# Patient Record
Sex: FEMALE | Race: BLACK OR AFRICAN AMERICAN | NOT HISPANIC OR LATINO | URBAN - METROPOLITAN AREA
[De-identification: names, ages, dates, MRNs, and addresses within clinical notes are randomized per-mention and may not be internally consistent; named-entity substitution may affect disease eponyms.]

---

## 2017-05-26 ENCOUNTER — OUTPATIENT (OUTPATIENT)
Dept: OUTPATIENT SERVICES | Facility: HOSPITAL | Age: 67
LOS: 1 days | End: 2017-05-26
Payer: COMMERCIAL

## 2017-05-26 VITALS
SYSTOLIC BLOOD PRESSURE: 142 MMHG | HEIGHT: 61 IN | HEART RATE: 78 BPM | RESPIRATION RATE: 16 BRPM | WEIGHT: 123.9 LBS | DIASTOLIC BLOOD PRESSURE: 98 MMHG

## 2017-05-26 DIAGNOSIS — Z98.890 OTHER SPECIFIED POSTPROCEDURAL STATES: Chronic | ICD-10-CM

## 2017-05-26 DIAGNOSIS — Z85.819 PERSONAL HISTORY OF MALIGNANT NEOPLASM OF UNSPECIFIED SITE OF LIP, ORAL CAVITY, AND PHARYNX: Chronic | ICD-10-CM

## 2017-05-26 DIAGNOSIS — I77.0 ARTERIOVENOUS FISTULA, ACQUIRED: Chronic | ICD-10-CM

## 2017-05-26 DIAGNOSIS — N18.6 END STAGE RENAL DISEASE: ICD-10-CM

## 2017-05-26 DIAGNOSIS — Z01.818 ENCOUNTER FOR OTHER PREPROCEDURAL EXAMINATION: ICD-10-CM

## 2017-05-26 LAB
ANION GAP SERPL CALC-SCNC: 9 MMOL/L — SIGNIFICANT CHANGE UP (ref 5–17)
BUN SERPL-MCNC: 26 MG/DL — HIGH (ref 7–18)
CALCIUM SERPL-MCNC: 10 MG/DL — SIGNIFICANT CHANGE UP (ref 8.4–10.5)
CHLORIDE SERPL-SCNC: 102 MMOL/L — SIGNIFICANT CHANGE UP (ref 96–108)
CO2 SERPL-SCNC: 26 MMOL/L — SIGNIFICANT CHANGE UP (ref 22–31)
CREAT SERPL-MCNC: 5.34 MG/DL — HIGH (ref 0.5–1.3)
GLUCOSE SERPL-MCNC: 97 MG/DL — SIGNIFICANT CHANGE UP (ref 70–99)
HCT VFR BLD CALC: 41.9 % — SIGNIFICANT CHANGE UP (ref 34.5–45)
HGB BLD-MCNC: 13.2 G/DL — SIGNIFICANT CHANGE UP (ref 11.5–15.5)
MCHC RBC-ENTMCNC: 29.4 PG — SIGNIFICANT CHANGE UP (ref 27–34)
MCHC RBC-ENTMCNC: 31.4 GM/DL — LOW (ref 32–36)
MCV RBC AUTO: 93.5 FL — SIGNIFICANT CHANGE UP (ref 80–100)
PLATELET # BLD AUTO: 179 K/UL — SIGNIFICANT CHANGE UP (ref 150–400)
POTASSIUM SERPL-MCNC: 3.9 MMOL/L — SIGNIFICANT CHANGE UP (ref 3.5–5.3)
POTASSIUM SERPL-SCNC: 3.9 MMOL/L — SIGNIFICANT CHANGE UP (ref 3.5–5.3)
RBC # BLD: 4.48 M/UL — SIGNIFICANT CHANGE UP (ref 3.8–5.2)
RBC # FLD: 14.4 % — SIGNIFICANT CHANGE UP (ref 10.3–14.5)
SODIUM SERPL-SCNC: 137 MMOL/L — SIGNIFICANT CHANGE UP (ref 135–145)
WBC # BLD: 8.7 K/UL — SIGNIFICANT CHANGE UP (ref 3.8–10.5)
WBC # FLD AUTO: 8.7 K/UL — SIGNIFICANT CHANGE UP (ref 3.8–10.5)

## 2017-05-26 PROCEDURE — G0463: CPT

## 2017-05-26 PROCEDURE — 85027 COMPLETE CBC AUTOMATED: CPT

## 2017-05-26 PROCEDURE — 80048 BASIC METABOLIC PNL TOTAL CA: CPT

## 2017-05-26 RX ORDER — SODIUM CHLORIDE 9 MG/ML
3 INJECTION INTRAMUSCULAR; INTRAVENOUS; SUBCUTANEOUS EVERY 8 HOURS
Qty: 0 | Refills: 0 | Status: DISCONTINUED | OUTPATIENT
Start: 2017-06-06 | End: 2017-06-06

## 2017-05-26 NOTE — H&P PST ADULT - NEGATIVE ENMT SYMPTOMS
no ear pain/no sinus symptoms/no tinnitus/no nasal congestion/no nasal discharge/no hearing difficulty/no vertigo

## 2017-05-26 NOTE — H&P PST ADULT - FAMILY HISTORY
Sibling  Still living? Unknown  Family history of diabetes mellitus in sister, Age at diagnosis: Age Unknown

## 2017-05-26 NOTE — H&P PST ADULT - NSANTHOSAYNRD_GEN_A_CORE
No. DOC screening performed.  STOP BANG Legend: 0-2 = LOW Risk; 3-4 = INTERMEDIATE Risk; 5-8 = HIGH Risk

## 2017-05-26 NOTE — H&P PST ADULT - NEGATIVE OPHTHALMOLOGIC SYMPTOMS
no blurred vision L/no diplopia/no photophobia/no lacrimation L/no lacrimation R/no blurred vision R

## 2017-05-26 NOTE — H&P PST ADULT - NEGATIVE ALLERGY TYPES
no reactions to animals/no indoor environmental allergies/no reactions to insect bites/no reactions to food

## 2017-05-26 NOTE — H&P PST ADULT - PSH
AVF (arteriovenous fistula)  x3  H/O hemorrhoidectomy    H/O oral cancer    S/P bunionectomy  bilateral great toes

## 2017-05-26 NOTE — H&P PST ADULT - MUSCULOSKELETAL
detailed exam no joint erythema/no calf tenderness/no joint warmth/normal strength/no joint swelling/ROM intact details…

## 2017-06-06 ENCOUNTER — TRANSCRIPTION ENCOUNTER (OUTPATIENT)
Age: 67
End: 2017-06-06

## 2017-06-06 ENCOUNTER — OUTPATIENT (OUTPATIENT)
Dept: OUTPATIENT SERVICES | Facility: HOSPITAL | Age: 67
LOS: 1 days | Discharge: HOME | End: 2017-06-06
Payer: COMMERCIAL

## 2017-06-06 VITALS
DIASTOLIC BLOOD PRESSURE: 90 MMHG | TEMPERATURE: 98 F | OXYGEN SATURATION: 100 % | HEIGHT: 61 IN | HEART RATE: 73 BPM | RESPIRATION RATE: 16 BRPM | WEIGHT: 123.9 LBS | SYSTOLIC BLOOD PRESSURE: 115 MMHG

## 2017-06-06 VITALS
OXYGEN SATURATION: 99 % | TEMPERATURE: 98 F | RESPIRATION RATE: 14 BRPM | DIASTOLIC BLOOD PRESSURE: 82 MMHG | SYSTOLIC BLOOD PRESSURE: 120 MMHG | HEART RATE: 80 BPM

## 2017-06-06 DIAGNOSIS — N18.6 END STAGE RENAL DISEASE: ICD-10-CM

## 2017-06-06 DIAGNOSIS — Z85.819 PERSONAL HISTORY OF MALIGNANT NEOPLASM OF UNSPECIFIED SITE OF LIP, ORAL CAVITY, AND PHARYNX: Chronic | ICD-10-CM

## 2017-06-06 DIAGNOSIS — I77.0 ARTERIOVENOUS FISTULA, ACQUIRED: Chronic | ICD-10-CM

## 2017-06-06 DIAGNOSIS — Z98.890 OTHER SPECIFIED POSTPROCEDURAL STATES: Chronic | ICD-10-CM

## 2017-06-06 LAB
ANION GAP SERPL CALC-SCNC: 11 MMOL/L — SIGNIFICANT CHANGE UP (ref 5–17)
BUN SERPL-MCNC: 28 MG/DL — HIGH (ref 7–18)
CALCIUM SERPL-MCNC: 10.4 MG/DL — SIGNIFICANT CHANGE UP (ref 8.4–10.5)
CHLORIDE SERPL-SCNC: 104 MMOL/L — SIGNIFICANT CHANGE UP (ref 96–108)
CO2 SERPL-SCNC: 23 MMOL/L — SIGNIFICANT CHANGE UP (ref 22–31)
CREAT SERPL-MCNC: 5.16 MG/DL — HIGH (ref 0.5–1.3)
GLUCOSE SERPL-MCNC: 117 MG/DL — HIGH (ref 70–99)
POTASSIUM SERPL-MCNC: 4 MMOL/L — SIGNIFICANT CHANGE UP (ref 3.5–5.3)
POTASSIUM SERPL-SCNC: 4 MMOL/L — SIGNIFICANT CHANGE UP (ref 3.5–5.3)
SODIUM SERPL-SCNC: 138 MMOL/L — SIGNIFICANT CHANGE UP (ref 135–145)

## 2017-06-06 PROCEDURE — C1768: CPT

## 2017-06-06 PROCEDURE — 36830 ARTERY-VEIN NONAUTOGRAFT: CPT | Mod: LT

## 2017-06-06 PROCEDURE — 80048 BASIC METABOLIC PNL TOTAL CA: CPT

## 2017-06-06 RX ORDER — FENTANYL CITRATE 50 UG/ML
50 INJECTION INTRAVENOUS
Qty: 0 | Refills: 0 | Status: DISCONTINUED | OUTPATIENT
Start: 2017-06-06 | End: 2017-06-06

## 2017-06-06 RX ORDER — SODIUM CHLORIDE 9 MG/ML
1000 INJECTION INTRAMUSCULAR; INTRAVENOUS; SUBCUTANEOUS
Qty: 0 | Refills: 0 | Status: DISCONTINUED | OUTPATIENT
Start: 2017-06-06 | End: 2017-06-06

## 2017-06-06 RX ORDER — FENTANYL CITRATE 50 UG/ML
25 INJECTION INTRAVENOUS
Qty: 0 | Refills: 0 | Status: DISCONTINUED | OUTPATIENT
Start: 2017-06-06 | End: 2017-06-06

## 2017-06-06 RX ADMIN — FENTANYL CITRATE 25 MICROGRAM(S): 50 INJECTION INTRAVENOUS at 14:20

## 2017-06-06 RX ADMIN — FENTANYL CITRATE 25 MICROGRAM(S): 50 INJECTION INTRAVENOUS at 14:00

## 2017-06-06 RX ADMIN — SODIUM CHLORIDE 3 MILLILITER(S): 9 INJECTION INTRAMUSCULAR; INTRAVENOUS; SUBCUTANEOUS at 08:20

## 2017-06-06 NOTE — ASU DISCHARGE PLAN (ADULT/PEDIATRIC). - MEDICATION SUMMARY - MEDICATIONS TO TAKE
I will START or STAY ON the medications listed below when I get home from the hospital:    metoprolol succinate 25 mg oral tablet, extended release  -- 1 tab(s) by mouth once a day  -- Indication: For .    calcium acetate 667 mg oral capsule  -- 2 cap(s) by mouth 3 times a day  -- Indication: For .    Nephro-Sameer oral tablet  -- 1 tab(s) by mouth once a day  -- Indication: For .

## 2017-06-06 NOTE — ASU DISCHARGE PLAN (ADULT/PEDIATRIC). - SPECIAL INSTRUCTIONS
Please use ice packs for 30 minutes on then 30 minutes off as much as possible until post-operative appointment. Make sure to protect your skin by placing a towel between the ice pack and your skin.

## 2017-06-06 NOTE — ASU DISCHARGE PLAN (ADULT/PEDIATRIC). - NURSING INSTRUCTIONS
Keep dressing dry, clean, intact for 3 days. Do not get incision wet for 72 hours (3 days). After 3 days, remove dressing and leave steri strips (white tapes) on. You can shower with steri strips on. The steri strips will fall off during shower. Do not rub them off. It sometimes take 2-3 days for them to fall off.

## 2017-06-06 NOTE — BRIEF OPERATIVE NOTE - PRE-OP DX
Complication of arteriovenous dialysis fistula, sequela  06/06/2017  Non transposed Brachial Vein  Active  Darren Avila S

## 2017-06-06 NOTE — ASU DISCHARGE PLAN (ADULT/PEDIATRIC). - NOTIFY
Swelling that continues/Numbness, color, or temperature change to extremity/Bleeding that does not stop/Fever greater than 101/Numbness, tingling/Pain not relieved by Medications
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
## 2017-06-09 DIAGNOSIS — E11.9 TYPE 2 DIABETES MELLITUS WITHOUT COMPLICATIONS: ICD-10-CM

## 2017-06-09 DIAGNOSIS — E55.9 VITAMIN D DEFICIENCY, UNSPECIFIED: ICD-10-CM

## 2017-06-09 DIAGNOSIS — I12.0 HYPERTENSIVE CHRONIC KIDNEY DISEASE WITH STAGE 5 CHRONIC KIDNEY DISEASE OR END STAGE RENAL DISEASE: ICD-10-CM

## 2017-06-09 DIAGNOSIS — I10 ESSENTIAL (PRIMARY) HYPERTENSION: ICD-10-CM

## 2017-06-09 DIAGNOSIS — Z87.891 PERSONAL HISTORY OF NICOTINE DEPENDENCE: ICD-10-CM

## 2017-06-09 DIAGNOSIS — E83.39 OTHER DISORDERS OF PHOSPHORUS METABOLISM: ICD-10-CM

## 2017-06-09 DIAGNOSIS — T82.898A OTHER SPECIFIED COMPLICATION OF VASCULAR PROSTHETIC DEVICES, IMPLANTS AND GRAFTS, INITIAL ENCOUNTER: ICD-10-CM

## 2017-12-02 NOTE — H&P PST ADULT - REASON FOR ADMISSION
[facility]    PATIENT'S NAME: Devi Downing   ATTENDING PHYSICIAN: James Padilla MD   OPERATING PHYSICIAN: Jason Aguirre M.D.    PATIENT ACCOUNT#:   [de-identified]    LOCATION:  [address] [place] [address]  MEDICAL RECORD #:   [id number]       DATE OF YULY second layer being 0 Vicryl in a running imbricating-type fashion. Two additional figure-of-eight sutures were placed for hemostasis. The uterus was placed back in the abdomen. Irrigation was performed. Gutters were cleared.   Reinspection of the lower Difficult cannulation left AVF

## 2019-01-24 PROBLEM — I10 ESSENTIAL (PRIMARY) HYPERTENSION: Chronic | Status: ACTIVE | Noted: 2017-05-26

## 2019-01-24 PROBLEM — N18.6 END STAGE RENAL DISEASE: Chronic | Status: ACTIVE | Noted: 2017-05-26

## 2019-01-24 PROBLEM — E55.9 VITAMIN D DEFICIENCY, UNSPECIFIED: Chronic | Status: ACTIVE | Noted: 2017-05-26

## 2019-01-24 PROBLEM — E83.39 OTHER DISORDERS OF PHOSPHORUS METABOLISM: Chronic | Status: ACTIVE | Noted: 2017-05-26

## 2019-02-05 ENCOUNTER — APPOINTMENT (OUTPATIENT)
Dept: CARDIOLOGY | Facility: CLINIC | Age: 69
End: 2019-02-05
Payer: MEDICARE

## 2019-02-05 ENCOUNTER — NON-APPOINTMENT (OUTPATIENT)
Age: 69
End: 2019-02-05

## 2019-02-05 VITALS
BODY MASS INDEX: 21.99 KG/M2 | DIASTOLIC BLOOD PRESSURE: 90 MMHG | WEIGHT: 118 LBS | OXYGEN SATURATION: 96 % | SYSTOLIC BLOOD PRESSURE: 146 MMHG | HEART RATE: 71 BPM | HEIGHT: 61.5 IN

## 2019-02-05 DIAGNOSIS — Z95.5 PRESENCE OF CORONARY ANGIOPLASTY IMPLANT AND GRAFT: ICD-10-CM

## 2019-02-05 DIAGNOSIS — R07.9 CHEST PAIN, UNSPECIFIED: ICD-10-CM

## 2019-02-05 DIAGNOSIS — I10 ESSENTIAL (PRIMARY) HYPERTENSION: ICD-10-CM

## 2019-02-05 DIAGNOSIS — I25.10 ATHEROSCLEROTIC HEART DISEASE OF NATIVE CORONARY ARTERY W/OUT ANGINA PECTORIS: ICD-10-CM

## 2019-02-05 DIAGNOSIS — R06.02 SHORTNESS OF BREATH: ICD-10-CM

## 2019-02-05 PROCEDURE — 93306 TTE W/DOPPLER COMPLETE: CPT

## 2019-02-05 PROCEDURE — 99215 OFFICE O/P EST HI 40 MIN: CPT

## 2019-02-05 PROCEDURE — 93000 ELECTROCARDIOGRAM COMPLETE: CPT

## 2019-02-05 RX ORDER — SEVELAMER CARBONATE 800 MG/1
800 TABLET, FILM COATED ORAL 3 TIMES DAILY
Refills: 0 | Status: ACTIVE | COMMUNITY

## 2019-02-05 RX ORDER — NITROGLYCERIN 0.4 MG/1
0.4 TABLET SUBLINGUAL
Qty: 25 | Refills: 1 | Status: ACTIVE | COMMUNITY
Start: 2019-02-05 | End: 1900-01-01

## 2019-02-05 NOTE — DISCUSSION/SUMMARY
[FreeTextEntry1] : In a summary Therese Burrell is an elderly female with history of CAD s/p stents with chest tightness and shortness of breath on exertion, echo done showed normal LV systolic function. Nuclear stress test to rule out ischemia. Further plan based on stress test results. Any chest pains take SL NTG and go to nearest ER. Hypertension, BP high. Explained her to take Metoprolol regularly. Cut down on salt intake. ESRD, on hemodialysis. Follow up after stress test.

## 2019-02-05 NOTE — HISTORY OF PRESENT ILLNESS
[FreeTextEntry1] : Therese Burrell is a 68 year old female with history of CAD s/p stents x 2 in 2018 at [facility], hypertension, ESRD on hemodialysis for 3 years and diet controlled diabetes and hypercholesterolemia comes for cardiac evaluation. Complaining of chronic chest tightness/ pressure on walking long distance or uphill, 7/10 in intensity, non radiating lasts for couple of minutes and relieved by rest which improved after stents but getting worse for last 6 months. No pain at rest. Also complaining of mild chronic shortness of breath on exertion and fatigue which is relieved with rest in few minutes. No palpitations. Last seen cardiologist was 6 months ago. Not taking Metoprolol regularly. Follows up with PCP.

## 2019-02-05 NOTE — REVIEW OF SYSTEMS
[Headache] : headache [Feeling Fatigued] : feeling fatigued [Shortness Of Breath] : shortness of breath [Chest Pain] : chest pain [Abdominal Pain] : abdominal pain [Joint Pain] : joint pain [Negative] : Endocrine [Fever] : no fever [Chills] : no chills [Lower Ext Edema] : no extremity edema [Palpitations] : no palpitations [Nausea] : no nausea [Heartburn] : no heartburn [Change in Appetite] : no change in appetite [Dysphagia] : no dysphagia [Easy Bleeding] : no tendency for easy bleeding [Easy Bruising] : no tendency for easy bruising

## 2019-02-05 NOTE — REASON FOR VISIT
[Follow-Up - Clinic] : a clinic follow-up of [Chest Pain] : chest pain [Coronary Artery Disease] : coronary artery disease [Hypertension] : hypertension [FreeTextEntry1] : shortness of breath on exertion.

## 2019-02-19 ENCOUNTER — APPOINTMENT (OUTPATIENT)
Dept: CV DIAGNOSTICS | Facility: HOSPITAL | Age: 69
End: 2019-02-19

## 2019-02-26 ENCOUNTER — APPOINTMENT (OUTPATIENT)
Dept: CARDIOLOGY | Facility: CLINIC | Age: 69
End: 2019-02-26

## 2019-08-06 ENCOUNTER — APPOINTMENT (OUTPATIENT)
Dept: CARDIOLOGY | Facility: CLINIC | Age: 69
End: 2019-08-06

## 2020-07-29 ENCOUNTER — TRANSCRIPTION ENCOUNTER (OUTPATIENT)
Age: 70
End: 2020-07-29

## 2020-07-29 ENCOUNTER — INPATIENT (INPATIENT)
Facility: HOSPITAL | Age: 70
LOS: 8 days | Discharge: NURSING, REHAB OR LONG-TERM CARE | DRG: 853 | End: 2020-08-07
Attending: INTERNAL MEDICINE | Admitting: INTERNAL MEDICINE
Payer: COMMERCIAL

## 2020-07-29 VITALS
WEIGHT: 145.06 LBS | HEART RATE: 72 BPM | DIASTOLIC BLOOD PRESSURE: 70 MMHG | RESPIRATION RATE: 16 BRPM | SYSTOLIC BLOOD PRESSURE: 127 MMHG | HEIGHT: 62 IN | OXYGEN SATURATION: 98 % | TEMPERATURE: 99 F

## 2020-07-29 DIAGNOSIS — D64.9 ANEMIA, UNSPECIFIED: ICD-10-CM

## 2020-07-29 DIAGNOSIS — Z85.819 PERSONAL HISTORY OF MALIGNANT NEOPLASM OF UNSPECIFIED SITE OF LIP, ORAL CAVITY, AND PHARYNX: Chronic | ICD-10-CM

## 2020-07-29 DIAGNOSIS — Z29.9 ENCOUNTER FOR PROPHYLACTIC MEASURES, UNSPECIFIED: ICD-10-CM

## 2020-07-29 DIAGNOSIS — I77.0 ARTERIOVENOUS FISTULA, ACQUIRED: Chronic | ICD-10-CM

## 2020-07-29 DIAGNOSIS — N18.6 END STAGE RENAL DISEASE: ICD-10-CM

## 2020-07-29 DIAGNOSIS — L03.114 CELLULITIS OF LEFT UPPER LIMB: ICD-10-CM

## 2020-07-29 DIAGNOSIS — R09.89 OTHER SPECIFIED SYMPTOMS AND SIGNS INVOLVING THE CIRCULATORY AND RESPIRATORY SYSTEMS: ICD-10-CM

## 2020-07-29 DIAGNOSIS — E11.9 TYPE 2 DIABETES MELLITUS WITHOUT COMPLICATIONS: ICD-10-CM

## 2020-07-29 DIAGNOSIS — E55.9 VITAMIN D DEFICIENCY, UNSPECIFIED: ICD-10-CM

## 2020-07-29 DIAGNOSIS — Z98.890 OTHER SPECIFIED POSTPROCEDURAL STATES: Chronic | ICD-10-CM

## 2020-07-29 DIAGNOSIS — I10 ESSENTIAL (PRIMARY) HYPERTENSION: ICD-10-CM

## 2020-07-29 DIAGNOSIS — E46 UNSPECIFIED PROTEIN-CALORIE MALNUTRITION: ICD-10-CM

## 2020-07-29 LAB
ABO RH CONFIRMATION: SIGNIFICANT CHANGE UP
ALBUMIN SERPL ELPH-MCNC: 1.6 G/DL — LOW (ref 3.5–5)
ALP SERPL-CCNC: 230 U/L — HIGH (ref 40–120)
ALT FLD-CCNC: 24 U/L DA — SIGNIFICANT CHANGE UP (ref 10–60)
ANION GAP SERPL CALC-SCNC: 7 MMOL/L — SIGNIFICANT CHANGE UP (ref 5–17)
APPEARANCE UR: ABNORMAL
APTT BLD: 35.4 SEC — SIGNIFICANT CHANGE UP (ref 27.5–35.5)
AST SERPL-CCNC: 26 U/L — SIGNIFICANT CHANGE UP (ref 10–40)
BACTERIA # UR AUTO: ABNORMAL /HPF
BASOPHILS # BLD AUTO: 0.04 K/UL — SIGNIFICANT CHANGE UP (ref 0–0.2)
BASOPHILS NFR BLD AUTO: 0.4 % — SIGNIFICANT CHANGE UP (ref 0–2)
BILIRUB SERPL-MCNC: 0.5 MG/DL — SIGNIFICANT CHANGE UP (ref 0.2–1.2)
BILIRUB UR-MCNC: ABNORMAL
BLD GP AB SCN SERPL QL: SIGNIFICANT CHANGE UP
BUN SERPL-MCNC: 10 MG/DL — SIGNIFICANT CHANGE UP (ref 7–18)
CALCIUM SERPL-MCNC: 8.2 MG/DL — LOW (ref 8.4–10.5)
CHLORIDE SERPL-SCNC: 97 MMOL/L — SIGNIFICANT CHANGE UP (ref 96–108)
CO2 SERPL-SCNC: 33 MMOL/L — HIGH (ref 22–31)
COLOR SPEC: ABNORMAL
COMMENT - URINE: SIGNIFICANT CHANGE UP
CREAT SERPL-MCNC: 2.86 MG/DL — HIGH (ref 0.5–1.3)
DIFF PNL FLD: ABNORMAL
EOSINOPHIL # BLD AUTO: 0.05 K/UL — SIGNIFICANT CHANGE UP (ref 0–0.5)
EOSINOPHIL NFR BLD AUTO: 0.5 % — SIGNIFICANT CHANGE UP (ref 0–6)
EPI CELLS # UR: SIGNIFICANT CHANGE UP /HPF
GLUCOSE SERPL-MCNC: 93 MG/DL — SIGNIFICANT CHANGE UP (ref 70–99)
GLUCOSE UR QL: NEGATIVE — SIGNIFICANT CHANGE UP
HCT VFR BLD CALC: 21.3 % — LOW (ref 34.5–45)
HGB BLD-MCNC: 7.1 G/DL — LOW (ref 11.5–15.5)
HIV 1 & 2 AB SERPL IA.RAPID: SIGNIFICANT CHANGE UP
HYPOCHROMIA BLD QL: SLIGHT — SIGNIFICANT CHANGE UP
IMM GRANULOCYTES NFR BLD AUTO: 0.6 % — SIGNIFICANT CHANGE UP (ref 0–1.5)
INR BLD: 1.31 RATIO — HIGH (ref 0.88–1.16)
KETONES UR-MCNC: NEGATIVE — SIGNIFICANT CHANGE UP
LACTATE SERPL-SCNC: 1 MMOL/L — SIGNIFICANT CHANGE UP (ref 0.7–2)
LEUKOCYTE ESTERASE UR-ACNC: ABNORMAL
LYMPHOCYTES # BLD AUTO: 1.23 K/UL — SIGNIFICANT CHANGE UP (ref 1–3.3)
LYMPHOCYTES # BLD AUTO: 11.9 % — LOW (ref 13–44)
MANUAL SMEAR VERIFICATION: SIGNIFICANT CHANGE UP
MCHC RBC-ENTMCNC: 29.7 PG — SIGNIFICANT CHANGE UP (ref 27–34)
MCHC RBC-ENTMCNC: 33.3 GM/DL — SIGNIFICANT CHANGE UP (ref 32–36)
MCV RBC AUTO: 89.1 FL — SIGNIFICANT CHANGE UP (ref 80–100)
MONOCYTES # BLD AUTO: 1.1 K/UL — HIGH (ref 0–0.9)
MONOCYTES NFR BLD AUTO: 10.6 % — SIGNIFICANT CHANGE UP (ref 2–14)
NEUTROPHILS # BLD AUTO: 7.89 K/UL — HIGH (ref 1.8–7.4)
NEUTROPHILS NFR BLD AUTO: 76 % — SIGNIFICANT CHANGE UP (ref 43–77)
NITRITE UR-MCNC: NEGATIVE — SIGNIFICANT CHANGE UP
NRBC # BLD: 0 /100 WBCS — SIGNIFICANT CHANGE UP (ref 0–0)
PH UR: 8 — SIGNIFICANT CHANGE UP (ref 5–8)
PLAT MORPH BLD: NORMAL — SIGNIFICANT CHANGE UP
PLATELET # BLD AUTO: 146 K/UL — LOW (ref 150–400)
PLATELET COUNT - ESTIMATE: ABNORMAL
POTASSIUM SERPL-MCNC: 3 MMOL/L — LOW (ref 3.5–5.3)
POTASSIUM SERPL-SCNC: 3 MMOL/L — LOW (ref 3.5–5.3)
PROT SERPL-MCNC: 5.8 G/DL — LOW (ref 6–8.3)
PROT UR-MCNC: 100
PROTHROM AB SERPL-ACNC: 15.1 SEC — HIGH (ref 10.6–13.6)
RBC # BLD: 2.39 M/UL — LOW (ref 3.8–5.2)
RBC # FLD: 21.4 % — HIGH (ref 10.3–14.5)
RBC BLD AUTO: ABNORMAL
RBC CASTS # UR COMP ASSIST: >50 /HPF (ref 0–2)
SODIUM SERPL-SCNC: 137 MMOL/L — SIGNIFICANT CHANGE UP (ref 135–145)
SP GR SPEC: 1.01 — SIGNIFICANT CHANGE UP (ref 1.01–1.02)
TARGETS BLD QL SMEAR: SLIGHT — SIGNIFICANT CHANGE UP
TROPONIN I SERPL-MCNC: <0.015 NG/ML — SIGNIFICANT CHANGE UP (ref 0–0.04)
UROBILINOGEN FLD QL: NEGATIVE — SIGNIFICANT CHANGE UP
WBC # BLD: 10.37 K/UL — SIGNIFICANT CHANGE UP (ref 3.8–10.5)
WBC # FLD AUTO: 10.37 K/UL — SIGNIFICANT CHANGE UP (ref 3.8–10.5)
WBC UR QL: ABNORMAL /HPF (ref 0–5)

## 2020-07-29 PROCEDURE — 99285 EMERGENCY DEPT VISIT HI MDM: CPT

## 2020-07-29 PROCEDURE — 99223 1ST HOSP IP/OBS HIGH 75: CPT

## 2020-07-29 PROCEDURE — 71045 X-RAY EXAM CHEST 1 VIEW: CPT | Mod: 26

## 2020-07-29 PROCEDURE — 93990 DOPPLER FLOW TESTING: CPT | Mod: 26

## 2020-07-29 RX ORDER — CALCIUM ACETATE 667 MG
2 TABLET ORAL
Qty: 0 | Refills: 0 | DISCHARGE

## 2020-07-29 RX ORDER — METOPROLOL TARTRATE 50 MG
1 TABLET ORAL
Qty: 0 | Refills: 0 | DISCHARGE

## 2020-07-29 RX ORDER — METOPROLOL TARTRATE 50 MG
25 TABLET ORAL DAILY
Refills: 0 | Status: DISCONTINUED | OUTPATIENT
Start: 2020-07-29 | End: 2020-07-30

## 2020-07-29 RX ORDER — DIPHENHYDRAMINE HCL 50 MG
25 CAPSULE ORAL ONCE
Refills: 0 | Status: COMPLETED | OUTPATIENT
Start: 2020-07-29 | End: 2020-07-30

## 2020-07-29 RX ORDER — ACETAMINOPHEN 500 MG
1000 TABLET ORAL ONCE
Refills: 0 | Status: COMPLETED | OUTPATIENT
Start: 2020-07-29 | End: 2020-07-29

## 2020-07-29 RX ORDER — CALCIUM ACETATE 667 MG
667 TABLET ORAL
Refills: 0 | Status: DISCONTINUED | OUTPATIENT
Start: 2020-07-29 | End: 2020-07-30

## 2020-07-29 RX ORDER — HEPARIN SODIUM 5000 [USP'U]/ML
5000 INJECTION INTRAVENOUS; SUBCUTANEOUS EVERY 12 HOURS
Refills: 0 | Status: DISCONTINUED | OUTPATIENT
Start: 2020-07-29 | End: 2020-07-30

## 2020-07-29 RX ORDER — POTASSIUM CHLORIDE 20 MEQ
40 PACKET (EA) ORAL ONCE
Refills: 0 | Status: COMPLETED | OUTPATIENT
Start: 2020-07-29 | End: 2020-07-29

## 2020-07-29 RX ORDER — ASPIRIN/CALCIUM CARB/MAGNESIUM 324 MG
1 TABLET ORAL
Qty: 0 | Refills: 0 | DISCHARGE

## 2020-07-29 RX ORDER — POTASSIUM CHLORIDE 20 MEQ
40 PACKET (EA) ORAL ONCE
Refills: 0 | Status: DISCONTINUED | OUTPATIENT
Start: 2020-07-29 | End: 2020-07-29

## 2020-07-29 RX ORDER — ASPIRIN/CALCIUM CARB/MAGNESIUM 324 MG
81 TABLET ORAL DAILY
Refills: 0 | Status: DISCONTINUED | OUTPATIENT
Start: 2020-07-29 | End: 2020-07-30

## 2020-07-29 RX ORDER — ACETAMINOPHEN 500 MG
650 TABLET ORAL ONCE
Refills: 0 | Status: COMPLETED | OUTPATIENT
Start: 2020-07-29 | End: 2020-07-29

## 2020-07-29 RX ORDER — HYDROMORPHONE HYDROCHLORIDE 2 MG/ML
0.5 INJECTION INTRAMUSCULAR; INTRAVENOUS; SUBCUTANEOUS ONCE
Refills: 0 | Status: DISCONTINUED | OUTPATIENT
Start: 2020-07-29 | End: 2020-07-29

## 2020-07-29 RX ORDER — PIPERACILLIN AND TAZOBACTAM 4; .5 G/20ML; G/20ML
3.38 INJECTION, POWDER, LYOPHILIZED, FOR SOLUTION INTRAVENOUS ONCE
Refills: 0 | Status: COMPLETED | OUTPATIENT
Start: 2020-07-29 | End: 2020-07-29

## 2020-07-29 RX ORDER — OXYCODONE AND ACETAMINOPHEN 5; 325 MG/1; MG/1
1 TABLET ORAL ONCE
Refills: 0 | Status: DISCONTINUED | OUTPATIENT
Start: 2020-07-29 | End: 2020-07-29

## 2020-07-29 RX ORDER — VANCOMYCIN HCL 1 G
1000 VIAL (EA) INTRAVENOUS ONCE
Refills: 0 | Status: COMPLETED | OUTPATIENT
Start: 2020-07-29 | End: 2020-07-29

## 2020-07-29 RX ADMIN — Medication 250 MILLIGRAM(S): at 16:34

## 2020-07-29 RX ADMIN — Medication 400 MILLIGRAM(S): at 22:11

## 2020-07-29 RX ADMIN — Medication 1000 MILLIGRAM(S): at 22:55

## 2020-07-29 RX ADMIN — HYDROMORPHONE HYDROCHLORIDE 0.5 MILLIGRAM(S): 2 INJECTION INTRAMUSCULAR; INTRAVENOUS; SUBCUTANEOUS at 23:28

## 2020-07-29 RX ADMIN — PIPERACILLIN AND TAZOBACTAM 200 GRAM(S): 4; .5 INJECTION, POWDER, LYOPHILIZED, FOR SOLUTION INTRAVENOUS at 16:34

## 2020-07-29 RX ADMIN — Medication 40 MILLIEQUIVALENT(S): at 21:52

## 2020-07-29 RX ADMIN — OXYCODONE AND ACETAMINOPHEN 1 TABLET(S): 5; 325 TABLET ORAL at 17:00

## 2020-07-29 RX ADMIN — Medication 650 MILLIGRAM(S): at 17:00

## 2020-07-29 RX ADMIN — OXYCODONE AND ACETAMINOPHEN 1 TABLET(S): 5; 325 TABLET ORAL at 15:56

## 2020-07-29 RX ADMIN — Medication 650 MILLIGRAM(S): at 15:56

## 2020-07-29 NOTE — H&P ADULT - NSICDXPASTSURGICALHX_GEN_ALL_CORE_FT
PAST SURGICAL HISTORY:  AVF (arteriovenous fistula) x3    H/O hemorrhoidectomy     H/O oral cancer     S/P bunionectomy bilateral great toes

## 2020-07-29 NOTE — H&P ADULT - NSHPSOCIALHISTORY_GEN_ALL_CORE
Pt stays at [facility] rehab   Pt denies any smoking, alcohol or any form of substance abuse

## 2020-07-29 NOTE — H&P ADULT - PROBLEM SELECTOR PLAN 4
Pt has PMH of DM Pt has PMH of DM  Pt on Humalog sliding scale at facility   RBS- 93  C/W HSS  F/U A1C

## 2020-07-29 NOTE — H&P ADULT - NSICDXFAMILYHX_GEN_ALL_CORE_FT
FAMILY HISTORY:  Sibling  Still living? Unknown  Family history of diabetes mellitus in sister, Age at diagnosis: Age Unknown

## 2020-07-29 NOTE — H&P ADULT - NSICDXPASTMEDICALHX_GEN_ALL_CORE_FT
PAST MEDICAL HISTORY:  DM (diabetes mellitus)     ESRD (end stage renal disease)     Hyperphosphatemia     Hypertension     Vitamin D deficiency

## 2020-07-29 NOTE — CONSULT NOTE ADULT - SUBJECTIVE AND OBJECTIVE BOX
Vascular Surgery Consultation Note    Patient is a 69y old  Female who presents with a chief complaint of     HPI:  68 yo F with PMH ESRD, h/o L AVF created 3-4 years ago s/p exploration of AVF, interposition graft 2017 presents to ED c/o L UE swelling and pain at site of graft during dialysis x 1 week.  Pt states she had HD yesterday which was completed but had terrible pain during the dialysis. Pt states she has no fevers or chills. Denies shortness of breath. Pt denies drainage or bleeding from skin.     PAST MEDICAL & SURGICAL HISTORY:  Vitamin D deficiency  ESRD (end stage renal disease)  Hyperphosphatemia  Hypertension  S/P bunionectomy: bilateral great toes  H/O hemorrhoidectomy  H/O oral cancer  AVF (arteriovenous fistula): x3      Allergies    sulfADIAZINE (Angioedema)    Intolerances        MEDICATIONS  (STANDING):    MEDICATIONS  (PRN):      Vital Signs Last 24 Hrs  T(C): 37.1 (2020 14:57), Max: 37.1 (2020 14:57)  T(F): 98.7 (2020 14:57), Max: 98.7 (2020 14:57)  HR: 72 (2020 14:57) (72 - 72)  BP: 127/70 (2020 14:57) (127/70 - 127/70)  BP(mean): --  RR: 16 (2020 14:57) (16 - 16)  SpO2: 98% (2020 14:57) (98% - 98%)    Physical Exam:  Gen: awake, alert oriented NAD  Abd: soft NT ND no pulsatile mass  Ext: warm to touch, L arm + swelling  Vasc: good capillary refill, b/l fingertips cool to touch, palpable radial pulses, + graft with thrill, distal portion of graft w/ erythema, + tenderness to palpation    Labs:                          7.1    10.37 )-----------( 146      ( 2020 16:23 )             21.3     07-    137  |  97  |  10  ----------------------------<  93  3.0<L>   |  33<H>  |  2.86<H>    Ca    8.2<L>      2020 16:23    TPro  5.8<L>  /  Alb  1.6<L>  /  TBili  0.5  /  DBili  x   /  AST  26  /  ALT  24  /  AlkPhos  230<H>      PT/INR - ( 2020 16:23 )   PT: 15.1 sec;   INR: 1.31 ratio         PTT - ( 2020 16:23 )  PTT:35.4 sec  Urinalysis Basic - ( 2020 17:02 )    Color: Red / Appearance: bloody / S.015 / pH: x  Gluc: x / Ketone: Negative  / Bili: Small / Urobili: Negative   Blood: x / Protein: 100 / Nitrite: Negative   Leuk Esterase: Moderate / RBC: >50 /HPF / WBC 11-25 /HPF   Sq Epi: x / Non Sq Epi: Few /HPF / Bacteria: Moderate /HPF

## 2020-07-29 NOTE — H&P ADULT - PROBLEM SELECTOR PLAN 7
IMPROVE VTE Individual Risk Assessment    RISK                                                          Points  [] Previous VTE                                           3  [] Thrombophilia                                        2  [] Lower limb paralysis                              2   [] Current Cancer                                       2   [] Immobilization > 24 hrs                        1  [X] ICU/CCU stay > 24 hours                       1  [X] Age > 60                                                   1    IMPROVE VTE Score: 1   Heparin 5000sc q12

## 2020-07-29 NOTE — ED PROVIDER NOTE - NS ED MD TWO NIGHTS YN
Peripheral Block  Performed by: WOJCIECH PICKERING  Authorized by: WOJCIECH PICKERING     Start Time:  6/19/2019 5:20 PM  End Time:  6/19/2019 5:25 PM  Reason for Block: at surgeon's request and post-op pain management    patient identified, IV checked, site marked, risks and benefits discussed, surgical consent, monitors and equipment checked, pre-op evaluation and timeout performed    Patient Position:  Supine  Prep: ChloraPrep    Monitoring:  Heart rate, continuous pulse ox and cardiac monitor  Block Region:  Lower Extremity  Lower Extremity - Block Type:  FEMORAL nerve block, Supra-Inguinal Fascia Iliaca approach      Laterality:  Right  Procedures: ultrasound guided  Image captured, interpreted and electronically stored.  Local Infiltration:  Lidocaine  Strength:  1 %  Dose:  3 ml  Block Type:  Single-shot  Needle Length:  100mm  Needle Gauge:  21 G  Needle Localization:  Ultrasound guidance  Injection Assessment:  Negative aspiration for heme, no paresthesia on injection, incremental injection and local visualized surrounding nerve on ultrasound  Evidence of intravascular injection: No     Suprainguinal Fascia Iliaca Block   With the patient supine, the US transducer was placed perpendicular to the ASIS of the operative side. The ASIS was identified at a point where the internal oblique, transversus abdominis and psoas major are stacked medial to the iliacus muscle. The plane in between was the fascia iliaca. A nerve block needle was advanced into the fascia iliaca and local anesthetic was injected deep/lateral to the fascia iliaca, just above the iliacus muscle.    Ropivacaine 0.5% Batch CRV [id number] Exp Jan 2021         Yes

## 2020-07-29 NOTE — ED ADULT NURSE NOTE - NSIMPLEMENTINTERV_GEN_ALL_ED
Implemented All Fall Risk Interventions:  Barry to call system. Call bell, personal items and telephone within reach. Instruct patient to call for assistance. Room bathroom lighting operational. Non-slip footwear when patient is off stretcher. Physically safe environment: no spills, clutter or unnecessary equipment. Stretcher in lowest position, wheels locked, appropriate side rails in place. Provide visual cue, wrist band, yellow gown, etc. Monitor gait and stability. Monitor for mental status changes and reorient to person, place, and time. Review medications for side effects contributing to fall risk. Reinforce activity limits and safety measures with patient and family.

## 2020-07-29 NOTE — ED PROVIDER NOTE - OBJECTIVE STATEMENT
69 year old female with multiple comorbidities and ESRD presenting here with left arm fistula pain and swelling x1 week. Patient reports that symptoms started after dialysis a week ago, stating that it felt warm. Denies fever, chills, dizziness, weakness, or any other acute complaints. Reports that she has chest pain in the mid chest that is chronic for years now. Sent in today from rehab as nurses noted the swelling to the arm with erythema and warmth.

## 2020-07-29 NOTE — ED PROVIDER NOTE - CLINICAL SUMMARY MEDICAL DECISION MAKING FREE TEXT BOX
Patient with likely infected cellulitis vs deep infection for fistula. Will give broad dose of antibiotics and admit with vascular consult.

## 2020-07-29 NOTE — H&P ADULT - PROBLEM SELECTOR PLAN 3
Pt noted to have low Hb on admission 7.1  Anemia likely from CKD   Plan to transfuse 1unit of PRBC  F/U Anemia panel

## 2020-07-29 NOTE — H&P ADULT - NSHPLABSRESULTS_GEN_ALL_CORE
7.1    10.37 )-----------( 146      ( 2020 16:23 )             21.3           137  |  97  |  10  ----------------------------<  93  3.0<L>   |  33<H>  |  2.86<H>    Ca    8.2<L>      2020 16:23    TPro  5.8<L>  /  Alb  1.6<L>  /  TBili  0.5  /  DBili  x   /  AST  26  /  ALT  24  /  AlkPhos  230<H>                Urinalysis Basic - ( 2020 17:02 )    Color: Red / Appearance: bloody / S.015 / pH: x  Gluc: x / Ketone: Negative  / Bili: Small / Urobili: Negative   Blood: x / Protein: 100 / Nitrite: Negative   Leuk Esterase: Moderate / RBC: >50 /HPF / WBC 11-25 /HPF   Sq Epi: x / Non Sq Epi: Few /HPF / Bacteria: Moderate /HPF        PT/INR - ( 2020 16:23 )   PT: 15.1 sec;   INR: 1.31 ratio         PTT - ( 2020 16:23 )  PTT:35.4 sec    Lactate Trend   @ 16:23 Lactate:1.0       CARDIAC MARKERS ( 2020 16:23 )  <0.015 ng/mL / x     / x     / x     / x            CAPILLARY BLOOD GLUCOSE

## 2020-07-29 NOTE — H&P ADULT - NSHPPHYSICALEXAM_GEN_ALL_CORE
Vital Signs (24 Hrs):  T(C): 36.6 (07-29-20 @ 19:34), Max: 37.1 (07-29-20 @ 14:57)  HR: 77 (07-29-20 @ 19:34) (72 - 77)  BP: 113/68 (07-29-20 @ 19:34) (113/68 - 127/70)  RR: 18 (07-29-20 @ 19:34) (16 - 18)  SpO2: 98% (07-29-20 @ 19:34) (98% - 98%)  Wt(kg): --  Daily Height in cm: 157.48 (29 Jul 2020 14:57)    Daily     I&O's Summary

## 2020-07-29 NOTE — H&P ADULT - PROBLEM SELECTOR PLAN 1
Pt admitted for cellulitis at L arm at the av fistula site x 1 week \  a/w with erythema, swelling and tenderness  Vascular consulted-  concerns of possible infiltration vs underlying infection  Recc to hold HD for 2 days  In case of urgent need of HD, Liv to be placed  s/p 1 dose of zosyn and Vanco in ED  c/w iv abx zosyn and Vanco( with the HD session)  F/U Venous doppler L UE  ID consult- Dr. Boston

## 2020-07-29 NOTE — H&P ADULT - HISTORY OF PRESENT ILLNESS
69F from [facility], with PMH of HTN, ESRD, DM, Hyperphosphatemia,  Oral cancer, comes to the ED  with left arm AV fistula pain and swelling x1 week. Patient reports that symptoms started after dialysis a week ago, stating that it felt warm. Denies fever, chills, dizziness, weakness, or any other acute complaints. Reports that she has chest pain in the mid chest that is chronic for years now. Sent in today from rehab as nurses noted the swelling to the arm with erythema and     In the ED,   Pt is AAOX3, No signs of any distress  Vitals- 113/ 68, HR- 77, afebrile  wbc- 10.37  Hb 7.1  UA- UTI 69F from [facility], with PMH of HTN, ESRD, DM, Hyperphosphatemia,  Oral cancer(s/p excision) , comes to the ED  with left arm AV fistula pain and swelling x1 week. Pt was in her usual state of health until last week when she went for her HD session and developed gradual onset of swelling and pain at the AVF site on L arm. As per the patient the HD nurse had inserted the needle incorrectly that caused the swelling and severe pain. AVF however was not occluded and she had her HD sessions as per the schedule T/Th/Sa with her last HD session being yesterday.  Reports that she has chest pain in the mid chest that is chronic for years now.   Pt denies any smoking, alcohol or any form of substance abuse.   In the ED,   Pt is AAOX3, moderate distress due to pain   Vitals- 113/ 68, HR- 77, afebrile  wbc- 10.37  Hb 7.1  UA- UTI   EKG- NSR

## 2020-07-29 NOTE — ED ADULT NURSE NOTE - OBJECTIVE STATEMENT
Pt presents to ed from rehab center for c/o L arm dialysis port pain & swelling x 6 days. pt receives dialysis Tues, Thurs & Sat. Last session was on 7/28/20. Pt dialysis port presents swollen, warm & tender. Denies fever, chills, dizziness, HA, sob. Pt also endorses midsternal cp x several weeks that has been intermittent & non radiating. Breathing unlabored on RA.

## 2020-07-29 NOTE — H&P ADULT - PROBLEM SELECTOR PLAN 5
Pt had PMH of HTN   As per Pt BP has been well controlled ever since she is on HD  home meds- metoprolol 25mg od  c/w Metoprolol with parameters

## 2020-07-29 NOTE — H&P ADULT - PROBLEM SELECTOR PLAN 6
IMPROVE VTE Individual Risk Assessment    RISK                                                          Points  [] Previous VTE                                           3  [] Thrombophilia                                        2  [] Lower limb paralysis                              2   [] Current Cancer                                       2   [] Immobilization > 24 hrs                        1  [X] ICU/CCU stay > 24 hours                       1  [X] Age > 60                                                   1    IMPROVE VTE Score: 1   Heparin 5000sc q12 Pt noted to be very cachectic with dry skin   Has severe anemia   f/u nutrition consult

## 2020-07-29 NOTE — ED ADULT NURSE NOTE - CHPI ED NUR SYMPTOMS NEG
no dizziness/no fever/no tingling/no nausea/no chills/no decreased eating/drinking/no weakness/no vomiting

## 2020-07-29 NOTE — H&P ADULT - PROBLEM SELECTOR PLAN 2
Pt has Known PMH of ESRD   Pt on HD Pt has Known PMH of ESRD T/TH/S  Pt had last HD yesterday 07/28   Holding HD for 2 days as per Vasc recommendations  In case on any urgent HD need, consider moshe grider/ifrah callawya am

## 2020-07-29 NOTE — ED PROVIDER NOTE - PROGRESS NOTE DETAILS
Spoke w Dr Knight/MAR, will admit for cellulitis, poss fistula infection/thrombosis. Vascular Sx PA recommend US r/o collection/thrombosis. Will admit

## 2020-07-29 NOTE — ED PROVIDER NOTE - MUSCULOSKELETAL, MLM
left arm appears swollen distal to fistula. Forearm swollen. 2+ edema, warm to touch. No tenderness. Mild erythema. + thrill over fistula.

## 2020-07-29 NOTE — CONSULT NOTE ADULT - ASSESSMENT
L AV interposition graft, possible infiltration vs underlying infection  1. Recommend Duplex of graft to evaluate flow  2. Ok to use graft but proximal aspect  3. Will follow  4. D/w Dr. Moise and agreed L AV interposition graft, possible infiltration vs underlying infection  1. Recommend Duplex of graft to evaluate flow  2. Hold HD for 2 days, if possible. If pt develops fluid overload and HD sooner, will need shiley catheter placement  3. Will follow  4. D/w Dr. Moise and agreed

## 2020-07-30 ENCOUNTER — RESULT REVIEW (OUTPATIENT)
Age: 70
End: 2020-07-30

## 2020-07-30 ENCOUNTER — TRANSCRIPTION ENCOUNTER (OUTPATIENT)
Age: 70
End: 2020-07-30

## 2020-07-30 LAB
-  COAGULASE NEGATIVE STAPHYLOCOCCUS: SIGNIFICANT CHANGE UP
A1C WITH ESTIMATED AVERAGE GLUCOSE RESULT: 5.4 % — SIGNIFICANT CHANGE UP (ref 4–5.6)
ANION GAP SERPL CALC-SCNC: 8 MMOL/L — SIGNIFICANT CHANGE UP (ref 5–17)
BUN SERPL-MCNC: 12 MG/DL — SIGNIFICANT CHANGE UP (ref 7–18)
CALCIUM SERPL-MCNC: 8.2 MG/DL — LOW (ref 8.4–10.5)
CHLORIDE SERPL-SCNC: 98 MMOL/L — SIGNIFICANT CHANGE UP (ref 96–108)
CHOLEST SERPL-MCNC: <50 MG/DL — SIGNIFICANT CHANGE UP (ref 10–199)
CO2 SERPL-SCNC: 30 MMOL/L — SIGNIFICANT CHANGE UP (ref 22–31)
CREAT SERPL-MCNC: 3.13 MG/DL — HIGH (ref 0.5–1.3)
ESTIMATED AVERAGE GLUCOSE: 108 MG/DL — SIGNIFICANT CHANGE UP (ref 68–114)
FERRITIN SERPL-MCNC: 1949 NG/ML — HIGH (ref 15–150)
FOLATE SERPL-MCNC: 10.4 NG/ML — SIGNIFICANT CHANGE UP
GLUCOSE BLDC GLUCOMTR-MCNC: 102 MG/DL — HIGH (ref 70–99)
GLUCOSE BLDC GLUCOMTR-MCNC: 111 MG/DL — HIGH (ref 70–99)
GLUCOSE BLDC GLUCOMTR-MCNC: 114 MG/DL — HIGH (ref 70–99)
GLUCOSE BLDC GLUCOMTR-MCNC: 56 MG/DL — LOW (ref 70–99)
GLUCOSE BLDC GLUCOMTR-MCNC: 59 MG/DL — LOW (ref 70–99)
GLUCOSE BLDC GLUCOMTR-MCNC: 77 MG/DL — SIGNIFICANT CHANGE UP (ref 70–99)
GLUCOSE SERPL-MCNC: 57 MG/DL — LOW (ref 70–99)
GRAM STN FLD: SIGNIFICANT CHANGE UP
HCT VFR BLD CALC: 24.2 % — LOW (ref 34.5–45)
HDLC SERPL-MCNC: 11 MG/DL — LOW
HGB BLD-MCNC: 8.2 G/DL — LOW (ref 11.5–15.5)
IRON SATN MFR SERPL: 118 % — HIGH (ref 15–50)
IRON SATN MFR SERPL: 39 UG/DL — LOW (ref 40–150)
LIPID PNL WITH DIRECT LDL SERPL: <24 MG/DL — SIGNIFICANT CHANGE UP
MAGNESIUM SERPL-MCNC: 1.7 MG/DL — SIGNIFICANT CHANGE UP (ref 1.6–2.6)
MCHC RBC-ENTMCNC: 29.5 PG — SIGNIFICANT CHANGE UP (ref 27–34)
MCHC RBC-ENTMCNC: 33.9 GM/DL — SIGNIFICANT CHANGE UP (ref 32–36)
MCV RBC AUTO: 87.1 FL — SIGNIFICANT CHANGE UP (ref 80–100)
METHOD TYPE: SIGNIFICANT CHANGE UP
METHOD TYPE: SIGNIFICANT CHANGE UP
NRBC # BLD: 0 /100 WBCS — SIGNIFICANT CHANGE UP (ref 0–0)
PHOSPHATE SERPL-MCNC: 1.9 MG/DL — LOW (ref 2.5–4.5)
PLATELET # BLD AUTO: 124 K/UL — LOW (ref 150–400)
POTASSIUM SERPL-MCNC: 3.4 MMOL/L — LOW (ref 3.5–5.3)
POTASSIUM SERPL-SCNC: 3.4 MMOL/L — LOW (ref 3.5–5.3)
RBC # BLD: 2.78 M/UL — LOW (ref 3.8–5.2)
RBC # FLD: 18.8 % — HIGH (ref 10.3–14.5)
SARS-COV-2 IGG SERPL QL IA: POSITIVE
SARS-COV-2 IGM SERPL IA-ACNC: 7.7 INDEX — HIGH
SARS-COV-2 RNA SPEC QL NAA+PROBE: SIGNIFICANT CHANGE UP
SODIUM SERPL-SCNC: 136 MMOL/L — SIGNIFICANT CHANGE UP (ref 135–145)
SPECIMEN SOURCE: SIGNIFICANT CHANGE UP
SPECIMEN SOURCE: SIGNIFICANT CHANGE UP
TIBC SERPL-MCNC: 33 UG/DL — LOW (ref 250–450)
TOTAL CHOLESTEROL/HDL RATIO MEASUREMENT: <4.5 RATIO — SIGNIFICANT CHANGE UP (ref 3.3–7.1)
TRIGL SERPL-MCNC: 77 MG/DL — SIGNIFICANT CHANGE UP (ref 10–149)
TROPONIN I SERPL-MCNC: <0.015 NG/ML — SIGNIFICANT CHANGE UP (ref 0–0.04)
TSH SERPL-MCNC: 3.09 UU/ML — SIGNIFICANT CHANGE UP (ref 0.34–4.82)
UIBC SERPL-MCNC: -6 UG/DL — LOW (ref 110–370)
VIT B12 SERPL-MCNC: 1310 PG/ML — HIGH (ref 232–1245)
WBC # BLD: 9.11 K/UL — SIGNIFICANT CHANGE UP (ref 3.8–10.5)
WBC # FLD AUTO: 9.11 K/UL — SIGNIFICANT CHANGE UP (ref 3.8–10.5)

## 2020-07-30 PROCEDURE — 35903 EXCISION GRAFT EXTREMITY: CPT

## 2020-07-30 PROCEDURE — 99232 SBSQ HOSP IP/OBS MODERATE 35: CPT | Mod: 57

## 2020-07-30 RX ORDER — PIPERACILLIN AND TAZOBACTAM 4; .5 G/20ML; G/20ML
2.25 INJECTION, POWDER, LYOPHILIZED, FOR SOLUTION INTRAVENOUS ONCE
Refills: 0 | Status: DISCONTINUED | OUTPATIENT
Start: 2020-07-30 | End: 2020-07-30

## 2020-07-30 RX ORDER — DEXTROSE 50 % IN WATER 50 %
15 SYRINGE (ML) INTRAVENOUS ONCE
Refills: 0 | Status: DISCONTINUED | OUTPATIENT
Start: 2020-07-30 | End: 2020-07-30

## 2020-07-30 RX ORDER — GLUCAGON INJECTION, SOLUTION 0.5 MG/.1ML
1 INJECTION, SOLUTION SUBCUTANEOUS ONCE
Refills: 0 | Status: DISCONTINUED | OUTPATIENT
Start: 2020-07-30 | End: 2020-07-30

## 2020-07-30 RX ORDER — VANCOMYCIN HCL 1 G
1000 VIAL (EA) INTRAVENOUS
Refills: 0 | Status: DISCONTINUED | OUTPATIENT
Start: 2020-07-30 | End: 2020-07-30

## 2020-07-30 RX ORDER — PIPERACILLIN AND TAZOBACTAM 4; .5 G/20ML; G/20ML
3.38 INJECTION, POWDER, LYOPHILIZED, FOR SOLUTION INTRAVENOUS ONCE
Refills: 0 | Status: COMPLETED | OUTPATIENT
Start: 2020-07-30 | End: 2020-07-30

## 2020-07-30 RX ORDER — DEXTROSE 50 % IN WATER 50 %
12.5 SYRINGE (ML) INTRAVENOUS ONCE
Refills: 0 | Status: DISCONTINUED | OUTPATIENT
Start: 2020-07-30 | End: 2020-07-30

## 2020-07-30 RX ORDER — MORPHINE SULFATE 50 MG/1
1 CAPSULE, EXTENDED RELEASE ORAL ONCE
Refills: 0 | Status: DISCONTINUED | OUTPATIENT
Start: 2020-07-30 | End: 2020-07-30

## 2020-07-30 RX ORDER — DEXTROSE 50 % IN WATER 50 %
25 SYRINGE (ML) INTRAVENOUS ONCE
Refills: 0 | Status: DISCONTINUED | OUTPATIENT
Start: 2020-07-30 | End: 2020-07-30

## 2020-07-30 RX ORDER — SODIUM CHLORIDE 9 MG/ML
1000 INJECTION, SOLUTION INTRAVENOUS
Refills: 0 | Status: DISCONTINUED | OUTPATIENT
Start: 2020-07-30 | End: 2020-07-30

## 2020-07-30 RX ORDER — HEPARIN SODIUM 5000 [USP'U]/ML
5000 INJECTION INTRAVENOUS; SUBCUTANEOUS
Qty: 0 | Refills: 0 | DISCHARGE
Start: 2020-07-30

## 2020-07-30 RX ORDER — INSULIN LISPRO 100/ML
1 VIAL (ML) SUBCUTANEOUS
Qty: 0 | Refills: 0 | DISCHARGE

## 2020-07-30 RX ORDER — PIPERACILLIN AND TAZOBACTAM 4; .5 G/20ML; G/20ML
3.38 INJECTION, POWDER, LYOPHILIZED, FOR SOLUTION INTRAVENOUS EVERY 12 HOURS
Refills: 0 | Status: DISCONTINUED | OUTPATIENT
Start: 2020-07-30 | End: 2020-07-30

## 2020-07-30 RX ORDER — VANCOMYCIN HCL 1 G
1 VIAL (EA) INTRAVENOUS
Qty: 0 | Refills: 0 | DISCHARGE
Start: 2020-07-30

## 2020-07-30 RX ORDER — MORPHINE SULFATE 50 MG/1
2 CAPSULE, EXTENDED RELEASE ORAL
Qty: 0 | Refills: 0 | DISCHARGE
Start: 2020-07-30

## 2020-07-30 RX ORDER — TRAMADOL HYDROCHLORIDE 50 MG/1
1 TABLET ORAL
Qty: 0 | Refills: 0 | DISCHARGE

## 2020-07-30 RX ORDER — CHLORHEXIDINE GLUCONATE 213 G/1000ML
1 SOLUTION TOPICAL EVERY 12 HOURS
Refills: 0 | Status: DISCONTINUED | OUTPATIENT
Start: 2020-07-30 | End: 2020-07-30

## 2020-07-30 RX ORDER — PIPERACILLIN AND TAZOBACTAM 4; .5 G/20ML; G/20ML
3.38 INJECTION, POWDER, LYOPHILIZED, FOR SOLUTION INTRAVENOUS
Qty: 0 | Refills: 0 | DISCHARGE
Start: 2020-07-30

## 2020-07-30 RX ORDER — PIPERACILLIN AND TAZOBACTAM 4; .5 G/20ML; G/20ML
2.25 INJECTION, POWDER, LYOPHILIZED, FOR SOLUTION INTRAVENOUS EVERY 8 HOURS
Refills: 0 | Status: DISCONTINUED | OUTPATIENT
Start: 2020-07-30 | End: 2020-07-30

## 2020-07-30 RX ORDER — MORPHINE SULFATE 50 MG/1
2 CAPSULE, EXTENDED RELEASE ORAL EVERY 6 HOURS
Refills: 0 | Status: DISCONTINUED | OUTPATIENT
Start: 2020-07-30 | End: 2020-07-30

## 2020-07-30 RX ADMIN — MORPHINE SULFATE 2 MILLIGRAM(S): 50 CAPSULE, EXTENDED RELEASE ORAL at 08:18

## 2020-07-30 RX ADMIN — MORPHINE SULFATE 2 MILLIGRAM(S): 50 CAPSULE, EXTENDED RELEASE ORAL at 20:13

## 2020-07-30 RX ADMIN — MORPHINE SULFATE 2 MILLIGRAM(S): 50 CAPSULE, EXTENDED RELEASE ORAL at 08:48

## 2020-07-30 RX ADMIN — MORPHINE SULFATE 2 MILLIGRAM(S): 50 CAPSULE, EXTENDED RELEASE ORAL at 14:05

## 2020-07-30 RX ADMIN — PIPERACILLIN AND TAZOBACTAM 25 GRAM(S): 4; .5 INJECTION, POWDER, LYOPHILIZED, FOR SOLUTION INTRAVENOUS at 18:05

## 2020-07-30 RX ADMIN — HYDROMORPHONE HYDROCHLORIDE 0.5 MILLIGRAM(S): 2 INJECTION INTRAMUSCULAR; INTRAVENOUS; SUBCUTANEOUS at 00:00

## 2020-07-30 RX ADMIN — Medication 25 MILLIGRAM(S): at 00:47

## 2020-07-30 RX ADMIN — MORPHINE SULFATE 1 MILLIGRAM(S): 50 CAPSULE, EXTENDED RELEASE ORAL at 18:08

## 2020-07-30 RX ADMIN — CHLORHEXIDINE GLUCONATE 1 APPLICATION(S): 213 SOLUTION TOPICAL at 18:05

## 2020-07-30 RX ADMIN — MORPHINE SULFATE 2 MILLIGRAM(S): 50 CAPSULE, EXTENDED RELEASE ORAL at 14:35

## 2020-07-30 RX ADMIN — HEPARIN SODIUM 5000 UNIT(S): 5000 INJECTION INTRAVENOUS; SUBCUTANEOUS at 06:11

## 2020-07-30 RX ADMIN — Medication 667 MILLIGRAM(S): at 08:55

## 2020-07-30 RX ADMIN — MORPHINE SULFATE 1 MILLIGRAM(S): 50 CAPSULE, EXTENDED RELEASE ORAL at 16:22

## 2020-07-30 RX ADMIN — Medication 25 MILLIGRAM(S): at 06:12

## 2020-07-30 RX ADMIN — Medication 667 MILLIGRAM(S): at 14:05

## 2020-07-30 RX ADMIN — PIPERACILLIN AND TAZOBACTAM 200 GRAM(S): 4; .5 INJECTION, POWDER, LYOPHILIZED, FOR SOLUTION INTRAVENOUS at 08:18

## 2020-07-30 RX ADMIN — Medication 250 MILLIGRAM(S): at 14:05

## 2020-07-30 RX ADMIN — Medication 81 MILLIGRAM(S): at 14:05

## 2020-07-30 RX ADMIN — HEPARIN SODIUM 5000 UNIT(S): 5000 INJECTION INTRAVENOUS; SUBCUTANEOUS at 18:05

## 2020-07-30 NOTE — PATIENT PROFILE ADULT - VISION (WITH CORRECTIVE LENSES IF THE PATIENT USUALLY WEARS THEM):
Partially impaired: cannot see medication labels or newsprint, but can see obstacles in path, and the surrounding layout; can count fingers at arm's length with glasses/Partially impaired: cannot see medication labels or newsprint, but can see obstacles in path, and the surrounding layout; can count fingers at arm's length

## 2020-07-30 NOTE — CHART NOTE - NSCHARTNOTEFT_GEN_A_CORE
Surgery Pre-op Note    Preoperative Diagnosis: infected AV graft/ pseudoaneurysm     Planned Procedure: resection of AV graft                          8.2    9.11  )-----------( 124      ( 30 Jul 2020 06:31 )             24.2     07-30    136  |  98  |  12  ----------------------------<  57<L>  3.4<L>   |  30  |  3.13<H>    Ca    8.2<L>      30 Jul 2020 06:31  Phos  1.9     07-30  Mg     1.7     07-30    TPro  5.8<L>  /  Alb  1.6<L>  /  TBili  0.5  /  DBili  x   /  AST  26  /  ALT  24  /  AlkPhos  230<H>  07-29    LIVER FUNCTIONS - ( 29 Jul 2020 16:23 )  Alb: 1.6 g/dL / Pro: 5.8 g/dL / ALK PHOS: 230 U/L / ALT: 24 U/L DA / AST: 26 U/L / GGT: x           PT/INR - ( 29 Jul 2020 16:23 )   PT: 15.1 sec;   INR: 1.31 ratio      PTT - ( 29 Jul 2020 16:23 )  PTT:35.4 sec    Type & Screen: ABO RH Interpretation: O POS: 07/29/2020 22:11  CCANAL  Attention: Second specimen is required for ABORH Confirmation. (07.29.20 @ 21:51)    EKG: < from: 12 Lead ECG (07.29.20 @ 16:22) >    Ventricular Rate 67 BPM    Atrial Rate 67 BPM    P-R Interval 132 ms    QRS Duration 78 ms    Q-T Interval 442 ms    QTC Calculation(Bezet) 467 ms    P Axis 41 degrees    R Axis 18 degrees    T Axis 10 degrees    Diagnosis Line *** Poor data quality, interpretation may be adversely affected  Normal sinus rhythm  Possible Left atrial enlargement  Left ventricular hypertrophy  Cannot rule out Septal infarct , age undetermined  ST & T wave abnormality, consider inferior ischemia  Abnormal ECG    Confirmed by KAVIN CONTRERAS, NILS ([id number]) on 7/30/2020 12:54:49 PM    < end of copied text >    CXR: < from: Xray Chest 1 View-PORTABLE IMMEDIATE (07.29.20 @ 17:54) >      EXAM:  XR CHEST PORTABLE IMMED 1V                            PROCEDURE DATE:  07/29/2020      INTERPRETATION:  XR CHEST IMMEDIATE    Single AP view    HISTORY:  Sepsis    Comparison: none.    The cardiac silhouette is within normal limits. The lungs are clear. No pleural abnormality.    IMPRESSION: Clear lungs.    VLADIMIR DOWNING M.D., ATTENDING RADIOLOGIST  This document has been electronically signed. Jul 29 2020  8:42PM    < end of copied text >    69 year old female with infected AVgraft, blood cultures Gram + cocci    - NPO after midnight  - plan to place shiley this afternoon for HD today, do not use AV graft/fistula  - nephrology consult, patient will need HD via shiley today prior to OR tomorrow  - please provide medical and cardiac clearance prior to OR  - preop labs in AM   - chlorhexidine wash  - consent to be obtained  - continue antibiotics

## 2020-07-30 NOTE — PROGRESS NOTE ADULT - SUBJECTIVE AND OBJECTIVE BOX
INTERVAL HPI/OVERNIGHT EVENTS:  Pt seen and examined at bedside. Continues to report pain at AV graft site.   Denies CP, SOB, fatigue, fever or chills.    MEDICATIONS  (STANDING):  aspirin enteric coated 81 milliGRAM(s) Oral daily  calcium acetate 667 milliGRAM(s) Oral three times a day with meals  dextrose 5%. 1000 milliLiter(s) (50 mL/Hr) IV Continuous <Continuous>  dextrose 50% Injectable 12.5 Gram(s) IV Push once  dextrose 50% Injectable 25 Gram(s) IV Push once  dextrose 50% Injectable 25 Gram(s) IV Push once  heparin   Injectable 5000 Unit(s) SubCutaneous every 12 hours  metoprolol succinate ER 25 milliGRAM(s) Oral daily  piperacillin/tazobactam IVPB.. 3.375 Gram(s) IV Intermittent every 12 hours  vancomycin  IVPB 1000 milliGRAM(s) IV Intermittent <User Schedule>    MEDICATIONS  (PRN):  dextrose 40% Gel 15 Gram(s) Oral once PRN Blood Glucose LESS THAN 70 milliGRAM(s)/deciLiter  glucagon  Injectable 1 milliGRAM(s) IntraMuscular once PRN Glucose <70 milliGRAM(s)/deciLiter  morphine  - Injectable 2 milliGRAM(s) IV Push every 6 hours PRN Severe Pain (7 - 10)    Vital Signs Last 24 Hrs  T(C): 36.3 (30 Jul 2020 07:34), Max: 37.1 (29 Jul 2020 14:57)  T(F): 97.4 (30 Jul 2020 07:34), Max: 98.7 (29 Jul 2020 14:57)  HR: 58 (30 Jul 2020 07:34) (58 - 77)  BP: 165/85 (30 Jul 2020 07:34) (113/68 - 165/85)  BP(mean): --  RR: 18 (30 Jul 2020 07:34) (15 - 18)  SpO2: 100% (30 Jul 2020 07:34) (98% - 100%)    Physical:  General: A&Ox3. NAD.  Resp: breathing unlabored  Abdomen: Soft nondistended, nontender.  Vascular: B/L upper extremities warm to touch. Left arm swelling. Left AV graft with mod tenderness to palpation. Overlying erythema. +thrill. Good capillary refill, b/l fingertips cool to touch, palpable radial pulses.    I&O's Detail    LABS:                        8.2    9.11  )-----------( 124      ( 30 Jul 2020 06:31 )             24.2             07-30    136  |  98  |  12  ----------------------------<  57<L>  3.4<L>   |  30  |  3.13<H>    Ca    8.2<L>      30 Jul 2020 06:31  Phos  1.9     07-30  Mg     1.7     07-30    TPro  5.8<L>  /  Alb  1.6<L>  /  TBili  0.5  /  DBili  x   /  AST  26  /  ALT  24  /  AlkPhos  230<H>  07-29

## 2020-07-30 NOTE — PROGRESS NOTE ADULT - ASSESSMENT
89F with L AV interposition graft, possible infiltration vs underlying infection  Afebrile.    -f/u venous duplex  -monitor labs and for fluid overload  -hold HD if safe, shiley catheter if requires urgent dialysis  -pain control prn  -care as per primary team

## 2020-07-30 NOTE — ACUTE INTERFACILITY TRANSFER NOTE - PLAN OF CARE
Improved flow and function of AVF Left AV interposition graft, possible infiltration vs underlying infection  f/u Venous duplex  Hold HD if safe. Shile catheter if requires urgent dialysis  Continue pain control  Further management as per medical /vascular team at next facility Resolution of cellulitis BC growing Gram positive cocci in clusters.   Continue antibiotic as prescribed'  pain control  f/u venous duplex  Further management as per medical /vascular team at next facility Continue with your dialysis treatments. Follow with your nephrologist (kidney physician). Continue your medications as prescribed. BP acceptable  Continue with BB  c/w ASA  Further management as per medical /vascular team at next facility likely due to your chronic kidney disease  Symptoms to report, bleeding, palpitations, fatigue, pale skin, cold skin, dizziness.   Take medications as ordered  Monitor CBC Your A1C 5.4  Maintain adequate nutrition to prevent hypoglycemia.  Report to your doctor signs /symptoms of  hypoglycemia.   Monitor BG ac/HS  Further management as per medical /vascular team at next facility Continue with Phoslo as prescribed\  Continue with dialysis as prescribed.  Monitor phosphorus .  Further management as per medical /vascular team at next facility

## 2020-07-30 NOTE — PROCEDURE NOTE - NSPROCDETAILS_GEN_ALL_CORE
sterile technique, catheter placed/sterile dressing applied/lumen(s) aspirated and flushed/guidewire recovered/ultrasound guidance

## 2020-07-30 NOTE — CONSULT NOTE ADULT - ASSESSMENT
Patient is a 69y old  Female from [facility], with PMH of HTN, DM, Hyperphosphatemia,  Oral cancer(s/p excision), ESRD on HD via AVF  which created 3-4 years ago s/p exploration of AVF, interposition graft 6/2017, send in  to the ER for evaluation of  left arm AV fistula pain and swelling x1 week. Last week when she went for her HD session and developed gradual onset of swelling and pain at the AVF site on L arm. As per the patient the HD nurse had inserted the needle incorrectly that caused the swelling and severe pain. The AVF was not occluded and she had her HD sessions as per the schedule Tu/Th/Sat with her last HD session being yesterday.  On admission, she has no fever or Leukocytosis, but admission blood cultures growing GPC in clusters. She has started on Zosyn and Vancomycin and the ID consult requested to assist with further evaluation and antibiotic management.      # Bacteremia- 7/29/20- GPC in clusters- ? source AVG  # Infected AVG with surrounding cellulitis    would recommend:    1. Excision of AVG since both blood cultures positive with GPC in clusters  2. Vancomycin level in AM and re-dose if level less than 20  and c/w Zosyn  3. Follow up final blood cultures for ID and sensitivity  4. Repeat Blood cultures X 2 in AM to document clearing the blood stream  5. Follow up COVID 19 PCR and c/w COVID precaution until resulted    will follow the patient with you and make further recommendation based on the clinical course and Lab results  Thank you for the opportunity to participate in Ms. BALBUENA's care    Attending Attestation:    Spent more than 65 minutes on total encounter, more than 50 % of the visit was spent counseling and/or coordinating care by the Attending physician. Patient is a 69y old  Female from [facility], with PMH of HTN, DM, Hyperphosphatemia,  Oral cancer(s/p excision), ESRD on HD via AVF  which created 3-4 years ago s/p exploration of AVF, interposition graft 6/2017, send in  to the ER for evaluation of  left arm AV fistula pain and swelling x1 week. Last week when she went for her HD session and developed gradual onset of swelling and pain at the AVF site on L arm. As per the patient the HD nurse had inserted the needle incorrectly that caused the swelling and severe pain. The AVF was not occluded and she had her HD sessions as per the schedule Tu/Th/Sat with her last HD session being yesterday.  On admission, she has no fever or Leukocytosis, but admission blood cultures growing GPC in clusters. She has started on Zosyn and Vancomycin and the ID consult requested to assist with further evaluation and antibiotic management.      # Bacteremia- 7/29/20- GPC in clusters- ? source AVG  # Infected AVG with surrounding cellulitis    would recommend:    1. Excision of AVG since both blood cultures positive with GPC in clusters  2. Vancomycin level in AM and re-dose if level less than 20  and c/w Zosyn  3. Follow up final blood cultures for ID and sensitivity  4. Repeat Blood cultures X 2 in AM to document clearing the blood stream  5. Follow up COVID 19 PCR and c/w COVID precaution until resulted    d/w Patient and ER Nursing staff     will follow the patient with you and make further recommendation based on the clinical course and Lab results  Thank you for the opportunity to participate in Ms. BALBUENA's care    Attending Attestation:    Spent more than 65 minutes on total encounter, more than 50 % of the visit was spent counseling and/or coordinating care by the Attending physician.

## 2020-07-30 NOTE — CONSULT NOTE ADULT - SUBJECTIVE AND OBJECTIVE BOX
Patient is a 69y old  Female from [facility], with PMH of HTN, DM, Hyperphosphatemia,  Oral cancer(s/p excision), ESRD on HD via AVF  which created 3-4 years ago s/p exploration of AVF, interposition graft 2017, send in  to the ER for evaluation of  left arm AV fistula pain and swelling x1 week. Last week when she went for her HD session and developed gradual onset of swelling and pain at the AVF site on L arm. As per the patient the HD nurse had inserted the needle incorrectly that caused the swelling and severe pain. The AVF was not occluded and she had her HD sessions as per the schedule //Sat with her last HD session being yesterday.  On admission, she has no fever or Leukocytosis, but admission blood cultures growing GPC in clusters. She has started on Zosyn and Vancomycin and the ID consult requested to assist with further evaluation and antibiotic management.        REVIEW OF SYSTEMS: Total of twelve systems have been reviewed with patient and found to be negative unless mentioned in HPI        PAST MEDICAL & SURGICAL HISTORY:  DM (diabetes mellitus)  Vitamin D deficiency  ESRD (end stage renal disease)  Hyperphosphatemia  Hypertension  S/P bunionectomy: bilateral great toes  H/O hemorrhoidectomy  H/O oral cancer  AVF (arteriovenous fistula): x3        SOCIAL HISTORY  Alcohol: Does not drink  Tobacco: Does not smoke  Illicit substance use: None      FAMILY HISTORY: Non contributory to the present illness        ALLERGIES: sulfADIAZINE (Angioedema)        Vital Signs Last 24 Hrs  T(C): 36.6 (2020 16:09), Max: 36.6 (2020 19:34)  T(F): 97.9 (2020 16:09), Max: 97.9 (2020 16:09)  HR: 71 (2020 16:09) (58 - 77)  BP: 194/99 (2020 16:09) (113/68 - 194/99)  BP(mean): --  RR: 18 (2020 16:09) (15 - 18)  SpO2: 100% (2020 16:09) (98% - 100%)        PHYSICAL EXAM:  GENERAL: Not in distress   CHEST/LUNG:  Not using accessory muscles  HEART: s1 and s2 present  ABDOMEN:  Nontender and  Nondistended  EXTREMITIES: No pedal  edema  CNS: Awake and Alert          LABS:                        8.2    9.11  )-----------( 124      ( 2020 06:31 )             24.2           07-30    136  |  98  |  12  ----------------------------<  57<L>  3.4<L>   |  30  |  3.13<H>    Ca    8.2<L>      2020 06:31  Phos  1.9     0730  Mg     1.7     0730    TPro  5.8<L>  /  Alb  1.6<L>  /  TBili  0.5  /  DBili  x   /  AST  26  /  ALT  24  /  AlkPhos  230<H>  07-29    PT/INR - ( 2020 16:23 )   PT: 15.1 sec;   INR: 1.31 ratio      PTT - ( 2020 16:23 )  PTT:35.4 sec        CAPILLARY BLOOD GLUCOSE  POCT Blood Glucose.: 114 mg/dL (2020 18:11)  POCT Blood Glucose.: 111 mg/dL (2020 12:15)  POCT Blood Glucose.: 102 mg/dL (2020 10:21)  POCT Blood Glucose.: 77 mg/dL (2020 09:09)  POCT Blood Glucose.: 56 mg/dL (2020 09:07)        Urinalysis Basic - ( 2020 17:02 )  Color: Red / Appearance: bloody / S.015 / pH: x  Gluc: x / Ketone: Negative  / Bili: Small / Urobili: Negative   Blood: x / Protein: 100 / Nitrite: Negative   Leuk Esterase: Moderate / RBC: >50 /HPF / WBC 11-25 /HPF   Sq Epi: x / Non Sq Epi: Few /HPF / Bacteria: Moderate /HPF        MEDICATIONS  (STANDING):  aspirin enteric coated 81 milliGRAM(s) Oral daily  chlorhexidine 2% Cloths 1 Application(s) Topical every 12 hours  dextrose 5%. 1000 milliLiter(s) (50 mL/Hr) IV Continuous <Continuous>  dextrose 50% Injectable 12.5 Gram(s) IV Push once  dextrose 50% Injectable 25 Gram(s) IV Push once  dextrose 50% Injectable 25 Gram(s) IV Push once  heparin   Injectable 5000 Unit(s) SubCutaneous every 12 hours  metoprolol succinate ER 25 milliGRAM(s) Oral daily  piperacillin/tazobactam IVPB.. 3.375 Gram(s) IV Intermittent every 12 hours  vancomycin  IVPB 1000 milliGRAM(s) IV Intermittent <User Schedule>    MEDICATIONS  (PRN):  dextrose 40% Gel 15 Gram(s) Oral once PRN Blood Glucose LESS THAN 70 milliGRAM(s)/deciLiter  glucagon  Injectable 1 milliGRAM(s) IntraMuscular once PRN Glucose <70 milliGRAM(s)/deciLiter  morphine  - Injectable 2 milliGRAM(s) IV Push every 6 hours PRN Severe Pain (7 - 10)          RADIOLOGY & ADDITIONAL TESTS: Patient is a 69y old  Female from [facility], with PMH of HTN, DM, Hyperphosphatemia,  Oral cancer(s/p excision), ESRD on HD via AVF  which created 3-4 years ago s/p exploration of AVF, interposition graft 2017, send in  to the ER for evaluation of  left arm AV fistula pain and swelling x1 week. Last week when she went for her HD session and developed gradual onset of swelling and pain at the AVF site on L arm. As per the patient the HD nurse had inserted the needle incorrectly that caused the swelling and severe pain. The AVF was not occluded and she had her HD sessions as per the schedule //Sat with her last HD session being yesterday.  On admission, she has no fever or Leukocytosis, but admission blood cultures growing GPC in clusters. She has started on Zosyn and Vancomycin and the ID consult requested to assist with further evaluation and antibiotic management.        REVIEW OF SYSTEMS: Total of twelve systems have been reviewed with patient and found to be negative unless mentioned in HPI        PAST MEDICAL & SURGICAL HISTORY:  DM (diabetes mellitus)  Vitamin D deficiency  ESRD (end stage renal disease)  Hyperphosphatemia  Hypertension  S/P bunionectomy: bilateral great toes  H/O hemorrhoidectomy  H/O oral cancer  AVF (arteriovenous fistula): x3        SOCIAL HISTORY  Alcohol: Does not drink  Tobacco: Does not smoke  Illicit substance use: None      FAMILY HISTORY: Non contributory to the present illness        ALLERGIES: sulfADIAZINE (Angioedema)        Vital Signs Last 24 Hrs  T(C): 36.6 (2020 16:09), Max: 36.6 (2020 19:34)  T(F): 97.9 (2020 16:09), Max: 97.9 (2020 16:09)  HR: 71 (2020 16:09) (58 - 77)  BP: 194/99 (2020 16:09) (113/68 - 194/99)  BP(mean): --  RR: 18 (2020 16:09) (15 - 18)  SpO2: 100% (2020 16:09) (98% - 100%)        PHYSICAL EXAM:  GENERAL: Not in distress   CHEST/LUNG:  Not using accessory muscles  HEART: s1 and s2 present  ABDOMEN:  Nontender and  Nondistended  EXTREMITIES: No pedal  edema, Left UE swollen and a blister at AVG site with tenderness   CNS: Awake and Alert          LABS:                        8.2    9.11  )-----------( 124      ( 2020 06:31 )             24.2           07-30    136  |  98  |  12  ----------------------------<  57<L>  3.4<L>   |  30  |  3.13<H>    Ca    8.2<L>      2020 06:31  Phos  1.9       Mg     1.7         TPro  5.8<L>  /  Alb  1.6<L>  /  TBili  0.5  /  DBili  x   /  AST  26  /  ALT  24  /  AlkPhos  230<H>      PT/INR - ( 2020 16:23 )   PT: 15.1 sec;   INR: 1.31 ratio      PTT - ( 2020 16:23 )  PTT:35.4 sec        CAPILLARY BLOOD GLUCOSE  POCT Blood Glucose.: 114 mg/dL (2020 18:11)  POCT Blood Glucose.: 111 mg/dL (2020 12:15)  POCT Blood Glucose.: 102 mg/dL (2020 10:21)  POCT Blood Glucose.: 77 mg/dL (2020 09:09)  POCT Blood Glucose.: 56 mg/dL (2020 09:07)        Urinalysis Basic - ( 2020 17:02 )  Color: Red / Appearance: bloody / S.015 / pH: x  Gluc: x / Ketone: Negative  / Bili: Small / Urobili: Negative   Blood: x / Protein: 100 / Nitrite: Negative   Leuk Esterase: Moderate / RBC: >50 /HPF / WBC 11-25 /HPF   Sq Epi: x / Non Sq Epi: Few /HPF / Bacteria: Moderate /HPF        MEDICATIONS  (STANDING):  aspirin enteric coated 81 milliGRAM(s) Oral daily  chlorhexidine 2% Cloths 1 Application(s) Topical every 12 hours  dextrose 5%. 1000 milliLiter(s) (50 mL/Hr) IV Continuous <Continuous>  dextrose 50% Injectable 12.5 Gram(s) IV Push once  dextrose 50% Injectable 25 Gram(s) IV Push once  dextrose 50% Injectable 25 Gram(s) IV Push once  heparin   Injectable 5000 Unit(s) SubCutaneous every 12 hours  metoprolol succinate ER 25 milliGRAM(s) Oral daily  piperacillin/tazobactam IVPB.. 3.375 Gram(s) IV Intermittent every 12 hours  vancomycin  IVPB 1000 milliGRAM(s) IV Intermittent <User Schedule>    MEDICATIONS  (PRN):  dextrose 40% Gel 15 Gram(s) Oral once PRN Blood Glucose LESS THAN 70 milliGRAM(s)/deciLiter  glucagon  Injectable 1 milliGRAM(s) IntraMuscular once PRN Glucose <70 milliGRAM(s)/deciLiter  morphine  - Injectable 2 milliGRAM(s) IV Push every 6 hours PRN Severe Pain (7 - 10)          RADIOLOGY & ADDITIONAL TESTS:

## 2020-07-30 NOTE — ACUTE INTERFACILITY TRANSFER NOTE - SECONDARY DIAGNOSIS.
Cellulitis of left upper extremity ESRD (end stage renal disease) Hypertension Anemia DM (diabetes mellitus) Hyperphosphatemia

## 2020-07-30 NOTE — DISCHARGE NOTE NURSING/CASE MANAGEMENT/SOCIAL WORK - PATIENT PORTAL LINK FT
You can access the FollowMyHealth Patient Portal offered by [facility] by registering at the following website: [email or web address]. By joining [facility]’s FollowMyHealth portal, you will also be able to view your health information using other applications (apps) compatible with our system.

## 2020-07-30 NOTE — CHART NOTE - NSCHARTNOTEFT_GEN_A_CORE
Patient was transferred to Dr. Avila service without attending being notified  Admitting services stated that OR called them to switch them from Dr. Knight service to Dr. Avila service  Transfer of patients should occur with sign out between two attendings  Patient should remain under medicine service  Dr. Avila will remain as the vascular consultant.  Admitting was contact to switch the patient back to the medical services

## 2020-07-30 NOTE — PROGRESS NOTE ADULT - SUBJECTIVE AND OBJECTIVE BOX
Notified by Dr. Iqbal at 20:10 re pt having a "blood blister" over the AVG.  Asked PA to check- L UE AVG site sig worse w bloody oozing/and hematoma- worse than earlier.  OR booked emergently   Pt seen ~21:00: L UE AVG site w hematoma/oozing/edema    Chart checked: pt arrived yest- Dx'ed w AVG PSA- reportedly was stable, asymp and w min changes.  Was planned for revision/excision tomorrow/  AVG placed 2017, maintained in [facility] per pt.  BCx + today  K 3.4  Temp HD cath placed today by CECILIA    Cond, options, risks/benefits/implications dw'ed pt. Informed consent obtained  Declines offer to contact family    Impending rupture of infected AVG pSA    To OR emergently for ligation/excision.

## 2020-07-30 NOTE — PROGRESS NOTE ADULT - SUBJECTIVE AND OBJECTIVE BOX
Patient is a 69y old  Female who presents with a chief complaint of Painful swelling at AV fistula. (29 Jul 2020 20:41)    pt seen in icu [  ], reg med floor [   ], bed [  ], chair at bedside [   ], a+o x3 [  ], lethargic [  ],  nad [  ]    mayo [  ], ngt [  ], peg [  ], et tube [  ], cent line [  ], picc line [  ]        Allergies    sulfADIAZINE (Angioedema)        Vitals    T(F): 97.4 (07-30-20 @ 01:21), Max: 98.7 (07-29-20 @ 14:57)  HR: 59 (07-30-20 @ 01:21) (59 - 77)  BP: 146/77 (07-30-20 @ 01:21) (113/68 - 152/80)  RR: 15 (07-30-20 @ 01:21) (15 - 18)  SpO2: 100% (07-30-20 @ 01:21) (98% - 100%)  Wt(kg): --  CAPILLARY BLOOD GLUCOSE          Labs                          7.1    10.37 )-----------( 146      ( 29 Jul 2020 16:23 )             21.3       07-29    137  |  97  |  10  ----------------------------<  93  3.0<L>   |  33<H>  |  2.86<H>    Ca    8.2<L>      29 Jul 2020 16:23    TPro  5.8<L>  /  Alb  1.6<L>  /  TBili  0.5  /  DBili  x   /  AST  26  /  ALT  24  /  AlkPhos  230<H>  07-29      CARDIAC MARKERS ( 29 Jul 2020 16:23 )  <0.015 ng/mL / x     / x     / x     / x                Radiology Results      Meds    MEDICATIONS  (STANDING):  aspirin enteric coated 81 milliGRAM(s) Oral daily  calcium acetate 667 milliGRAM(s) Oral three times a day with meals  heparin   Injectable 5000 Unit(s) SubCutaneous every 12 hours  metoprolol succinate ER 25 milliGRAM(s) Oral daily      MEDICATIONS  (PRN):      Physical Exam    Neuro :  no focal deficits  Respiratory: CTA B/L  CV: RRR, S1S2, no murmurs,   Abdominal: Soft, NT, ND +BS,  Extremities: No edema, + peripheral pulses    ASSESSMENT    Cellulitis of left upper extremity  DM (diabetes mellitus)  Vitamin D deficiency  ESRD (end stage renal disease)  Hyperphosphatemia  Hypertension  S/P bunionectomy  H/O hemorrhoidectomy  H/O oral cancer  AVF (arteriovenous fistula)      PLAN Patient is a 69y old  Female who presents with a chief complaint of Painful swelling at AV fistula. (29 Jul 2020 20:41)    pt seen in ed [ x ], reg med floor [   ], bed [ x ], chair at bedside [   ], a+o x3 [ x ], lethargic [  ],  nad [ x ]        Allergies    sulfADIAZINE (Angioedema)        Vitals    T(F): 97.4 (07-30-20 @ 01:21), Max: 98.7 (07-29-20 @ 14:57)  HR: 59 (07-30-20 @ 01:21) (59 - 77)  BP: 146/77 (07-30-20 @ 01:21) (113/68 - 152/80)  RR: 15 (07-30-20 @ 01:21) (15 - 18)  SpO2: 100% (07-30-20 @ 01:21) (98% - 100%)  Wt(kg): --  CAPILLARY BLOOD GLUCOSE          Labs                          7.1    10.37 )-----------( 146      ( 29 Jul 2020 16:23 )             21.3       07-29    137  |  97  |  10  ----------------------------<  93  3.0<L>   |  33<H>  |  2.86<H>    Ca    8.2<L>      29 Jul 2020 16:23    TPro  5.8<L>  /  Alb  1.6<L>  /  TBili  0.5  /  DBili  x   /  AST  26  /  ALT  24  /  AlkPhos  230<H>  07-29      CARDIAC MARKERS ( 29 Jul 2020 16:23 )  <0.015 ng/mL / x     / x     / x     / x                Radiology Results          Meds    MEDICATIONS  (STANDING):  aspirin enteric coated 81 milliGRAM(s) Oral daily  calcium acetate 667 milliGRAM(s) Oral three times a day with meals  heparin   Injectable 5000 Unit(s) SubCutaneous every 12 hours  metoprolol succinate ER 25 milliGRAM(s) Oral daily      MEDICATIONS  (PRN):      Physical Exam    Neuro :  no focal deficits  Respiratory: CTA B/L  CV: RRR, S1S2, no murmurs,   Abdominal: Soft, NT, ND +BS,  Extremities: No edema, + peripheral pulses, left ue avf tender to touch,      ASSESSMENT      L AV interposition graft, possible infiltration vs underlying infection  Cellulitis of left upper extremity    h/o ESRD on hd,   DM (diabetes mellitus)  Vitamin D deficiency  Hyperphosphatemia  Hypertension  S/P bunionectomy  hemorrhoidectomy  oral cancer  left AVF (arteriovenous fistula)      PLAN      cont zosyn,   id cons  f/u blood cx  vasc surg f/u  rec Hold HD for 2 days, if possible. If pt develops fluid overload and HD sooner, will need shiley catheter placement.  f/u doppler fistulogram   cont pain control  renal cons  cont hd as per renal   f/u hgba1c  hss  cont current meds Patient is a 69y old  Female who presents with a chief complaint of Painful swelling at AV fistula. (29 Jul 2020 20:41)    pt seen in ed [ x ], reg med floor [   ], bed [ x ], chair at bedside [   ], a+o x3 [ x ], lethargic [  ],  nad [ x ]        Allergies    sulfADIAZINE (Angioedema)        Vitals    T(F): 97.4 (07-30-20 @ 01:21), Max: 98.7 (07-29-20 @ 14:57)  HR: 59 (07-30-20 @ 01:21) (59 - 77)  BP: 146/77 (07-30-20 @ 01:21) (113/68 - 152/80)  RR: 15 (07-30-20 @ 01:21) (15 - 18)  SpO2: 100% (07-30-20 @ 01:21) (98% - 100%)  Wt(kg): --  CAPILLARY BLOOD GLUCOSE          Labs                          7.1    10.37 )-----------( 146      ( 29 Jul 2020 16:23 )             21.3       07-29    137  |  97  |  10  ----------------------------<  93  3.0<L>   |  33<H>  |  2.86<H>    Ca    8.2<L>      29 Jul 2020 16:23    TPro  5.8<L>  /  Alb  1.6<L>  /  TBili  0.5  /  DBili  x   /  AST  26  /  ALT  24  /  AlkPhos  230<H>  07-29      CARDIAC MARKERS ( 29 Jul 2020 16:23 )  <0.015 ng/mL / x     / x     / x     / x                Radiology Results          Meds    MEDICATIONS  (STANDING):  aspirin enteric coated 81 milliGRAM(s) Oral daily  calcium acetate 667 milliGRAM(s) Oral three times a day with meals  heparin   Injectable 5000 Unit(s) SubCutaneous every 12 hours  metoprolol succinate ER 25 milliGRAM(s) Oral daily      MEDICATIONS  (PRN):      Physical Exam    Neuro :  no focal deficits  Respiratory: CTA B/L  CV: RRR, S1S2, no murmurs,   Abdominal: Soft, NT, ND +BS,  Extremities: No edema, + peripheral pulses, left ue avf tender to touch,      ASSESSMENT      L AV interposition graft, possible infiltration vs underlying infection  Cellulitis of left upper extremity    uti  h/o ESRD on hd,   DM (diabetes mellitus)  Vitamin D deficiency  Hyperphosphatemia  Hypertension  S/P bunionectomy  hemorrhoidectomy  oral cancer  left AVF (arteriovenous fistula)      PLAN      cont zosyn,   cont vant vanco with hd  id cons  f/u blood cx and ucx  vasc surg f/u  rec Hold HD for 2 days, if possible. If pt develops fluid overload and HD sooner, will need shiley catheter placement.  f/u doppler fistulogram   cont pain control  renal cons  cont hd as per renal   f/u hgba1c  hss  cont current meds

## 2020-07-30 NOTE — ACUTE INTERFACILITY TRANSFER NOTE - HOSPITAL COURSE
69F from [facility], with PMH of HTN, ESRD, DM, Hyperphosphatemia,  Oral cancer(s/p excision) , comes to the ED  with left arm AV fistula pain and swelling x1 week. Pt was in her usual state of health until last week when she went for her HD session and developed gradual onset of swelling and pain at the AVF site on L arm. As per the patient the HD nurse had inserted the needle incorrectly that caused the swelling and severe pain. AVF however was not occluded and she had her HD sessions as per the schedule T/Th/Sa with her last HD session being yesterday.  Reports that she has chest pain in the mid chest that is chronic for years now.   Assessment:   L AV interposition graft, possible infiltration vs underlying infection  Cellulitis of left upper extremity    UTI  h/o ESRD on HD  DM (diabetes mellitus)  Vitamin D deficiency  Hyperphosphatemia  Hypertension  S/P bunionectomy  hemorrhoidectomy  oral cancer  left AVF (arteriovenous fistula)      PLAN      cont Zosyn,   cont vant vanco with hd  ID cons  BC growing gram positive cocci in clusters.   vasc surg f/u  rec Hold HD for 2 days, if possible. If pt develops fluid overload and HD sooner, will need shiley catheter placement.  f/u doppler fistulogram   cont pain control  renal cons  cont HD as per renal   HISS  cont current meds

## 2020-07-30 NOTE — CHART NOTE - NSCHARTNOTEFT_GEN_A_CORE
Received call from Wilda Surg PA , [employer] [phone]: Pt is planned for AVG tomorrow here at [facility] . Rec: dialyze pt before surgery tomorrow.  -D/w attending Dr. Knight. Consult Dr. Dykes  -Jaquan pt: her nephrologist is Dr. Huber Murillo, and she receives dialysis treatments at [facility] and nursing home.   -D/w Dr. Dykes above.  -NPO after midnight.  -Signed out to [address] [place] [address].    Sushila Quintana NP Medicine

## 2020-07-30 NOTE — CHART NOTE - NSCHARTNOTEFT_GEN_A_CORE
Called by attending, was notified of bleeding from LUE. Upon evaluation of LUE, pseudoaneurysm area was noted to be actively bleeding slowly at ulcerated area. Discussed with attendings of new findings, will book for emergent repair of LUE pseudoaneurysm. Of note, pt just ate prior to visit. 2U PRBC held. T&S from 7/29/2020

## 2020-07-31 LAB
-  COAGULASE NEGATIVE STAPHYLOCOCCUS: SIGNIFICANT CHANGE UP
ALBUMIN SERPL ELPH-MCNC: 1.4 G/DL — LOW (ref 3.5–5)
ALP SERPL-CCNC: 216 U/L — HIGH (ref 40–120)
ALT FLD-CCNC: 21 U/L DA — SIGNIFICANT CHANGE UP (ref 10–60)
ANION GAP SERPL CALC-SCNC: 7 MMOL/L — SIGNIFICANT CHANGE UP (ref 5–17)
ANION GAP SERPL CALC-SCNC: 8 MMOL/L — SIGNIFICANT CHANGE UP (ref 5–17)
AST SERPL-CCNC: 22 U/L — SIGNIFICANT CHANGE UP (ref 10–40)
BILIRUB SERPL-MCNC: 0.6 MG/DL — SIGNIFICANT CHANGE UP (ref 0.2–1.2)
BUN SERPL-MCNC: 15 MG/DL — SIGNIFICANT CHANGE UP (ref 7–18)
BUN SERPL-MCNC: 17 MG/DL — SIGNIFICANT CHANGE UP (ref 7–18)
CALCIUM SERPL-MCNC: 8.3 MG/DL — LOW (ref 8.4–10.5)
CALCIUM SERPL-MCNC: 8.4 MG/DL — SIGNIFICANT CHANGE UP (ref 8.4–10.5)
CHLORIDE SERPL-SCNC: 96 MMOL/L — SIGNIFICANT CHANGE UP (ref 96–108)
CHLORIDE SERPL-SCNC: 98 MMOL/L — SIGNIFICANT CHANGE UP (ref 96–108)
CK MB BLD-MCNC: <9.1 % — HIGH (ref 0–3.5)
CK MB CFR SERPL CALC: <1 NG/ML — SIGNIFICANT CHANGE UP (ref 0–3.6)
CK SERPL-CCNC: 11 U/L — LOW (ref 21–215)
CO2 SERPL-SCNC: 29 MMOL/L — SIGNIFICANT CHANGE UP (ref 22–31)
CO2 SERPL-SCNC: 30 MMOL/L — SIGNIFICANT CHANGE UP (ref 22–31)
CREAT SERPL-MCNC: 3.74 MG/DL — HIGH (ref 0.5–1.3)
CREAT SERPL-MCNC: 3.84 MG/DL — HIGH (ref 0.5–1.3)
CULTURE RESULTS: SIGNIFICANT CHANGE UP
CULTURE RESULTS: SIGNIFICANT CHANGE UP
GLUCOSE BLDC GLUCOMTR-MCNC: 103 MG/DL — HIGH (ref 70–99)
GLUCOSE BLDC GLUCOMTR-MCNC: 104 MG/DL — HIGH (ref 70–99)
GLUCOSE BLDC GLUCOMTR-MCNC: 106 MG/DL — HIGH (ref 70–99)
GLUCOSE BLDC GLUCOMTR-MCNC: 93 MG/DL — SIGNIFICANT CHANGE UP (ref 70–99)
GLUCOSE BLDC GLUCOMTR-MCNC: 94 MG/DL — SIGNIFICANT CHANGE UP (ref 70–99)
GLUCOSE SERPL-MCNC: 120 MG/DL — HIGH (ref 70–99)
GLUCOSE SERPL-MCNC: 98 MG/DL — SIGNIFICANT CHANGE UP (ref 70–99)
HCT VFR BLD CALC: 24.9 % — LOW (ref 34.5–45)
HCT VFR BLD CALC: 26.8 % — LOW (ref 34.5–45)
HCT VFR BLD CALC: 27.1 % — LOW (ref 34.5–45)
HCT VFR BLD CALC: 28 % — LOW (ref 34.5–45)
HGB BLD-MCNC: 8.3 G/DL — LOW (ref 11.5–15.5)
HGB BLD-MCNC: 9 G/DL — LOW (ref 11.5–15.5)
HGB BLD-MCNC: 9 G/DL — LOW (ref 11.5–15.5)
HGB BLD-MCNC: 9.6 G/DL — LOW (ref 11.5–15.5)
INR BLD: 1.45 RATIO — HIGH (ref 0.88–1.16)
LACTATE SERPL-SCNC: 2.3 MMOL/L — HIGH (ref 0.7–2)
LACTATE SERPL-SCNC: 3 MMOL/L — HIGH (ref 0.7–2)
MAGNESIUM SERPL-MCNC: 1.6 MG/DL — SIGNIFICANT CHANGE UP (ref 1.6–2.6)
MCHC RBC-ENTMCNC: 29.7 PG — SIGNIFICANT CHANGE UP (ref 27–34)
MCHC RBC-ENTMCNC: 30 PG — SIGNIFICANT CHANGE UP (ref 27–34)
MCHC RBC-ENTMCNC: 33.2 GM/DL — SIGNIFICANT CHANGE UP (ref 32–36)
MCHC RBC-ENTMCNC: 33.3 GM/DL — SIGNIFICANT CHANGE UP (ref 32–36)
MCHC RBC-ENTMCNC: 33.6 GM/DL — SIGNIFICANT CHANGE UP (ref 32–36)
MCHC RBC-ENTMCNC: 34.3 GM/DL — SIGNIFICANT CHANGE UP (ref 32–36)
MCV RBC AUTO: 87.5 FL — SIGNIFICANT CHANGE UP (ref 80–100)
MCV RBC AUTO: 88.4 FL — SIGNIFICANT CHANGE UP (ref 80–100)
MCV RBC AUTO: 89.2 FL — SIGNIFICANT CHANGE UP (ref 80–100)
MCV RBC AUTO: 89.4 FL — SIGNIFICANT CHANGE UP (ref 80–100)
NRBC # BLD: 0 /100 WBCS — SIGNIFICANT CHANGE UP (ref 0–0)
ORGANISM # SPEC MICROSCOPIC CNT: SIGNIFICANT CHANGE UP
ORGANISM # SPEC MICROSCOPIC CNT: SIGNIFICANT CHANGE UP
PHOSPHATE SERPL-MCNC: 2.1 MG/DL — LOW (ref 2.5–4.5)
PLATELET # BLD AUTO: 180 K/UL — SIGNIFICANT CHANGE UP (ref 150–400)
PLATELET # BLD AUTO: 188 K/UL — SIGNIFICANT CHANGE UP (ref 150–400)
PLATELET # BLD AUTO: 241 K/UL — SIGNIFICANT CHANGE UP (ref 150–400)
PLATELET # BLD AUTO: 255 K/UL — SIGNIFICANT CHANGE UP (ref 150–400)
POTASSIUM SERPL-MCNC: 3.4 MMOL/L — LOW (ref 3.5–5.3)
POTASSIUM SERPL-MCNC: 3.7 MMOL/L — SIGNIFICANT CHANGE UP (ref 3.5–5.3)
POTASSIUM SERPL-SCNC: 3.4 MMOL/L — LOW (ref 3.5–5.3)
POTASSIUM SERPL-SCNC: 3.7 MMOL/L — SIGNIFICANT CHANGE UP (ref 3.5–5.3)
PROT SERPL-MCNC: 5.6 G/DL — LOW (ref 6–8.3)
PROTHROM AB SERPL-ACNC: 16.7 SEC — HIGH (ref 10.6–13.6)
RBC # BLD: 2.79 M/UL — LOW (ref 3.8–5.2)
RBC # BLD: 3.03 M/UL — LOW (ref 3.8–5.2)
RBC # BLD: 3.03 M/UL — LOW (ref 3.8–5.2)
RBC # BLD: 3.2 M/UL — LOW (ref 3.8–5.2)
RBC # FLD: 18.8 % — HIGH (ref 10.3–14.5)
RBC # FLD: 19 % — HIGH (ref 10.3–14.5)
RBC # FLD: 19.1 % — HIGH (ref 10.3–14.5)
RBC # FLD: 19.2 % — HIGH (ref 10.3–14.5)
SODIUM SERPL-SCNC: 134 MMOL/L — LOW (ref 135–145)
SODIUM SERPL-SCNC: 134 MMOL/L — LOW (ref 135–145)
SPECIMEN SOURCE: SIGNIFICANT CHANGE UP
SPECIMEN SOURCE: SIGNIFICANT CHANGE UP
TROPONIN I SERPL-MCNC: <0.015 NG/ML — SIGNIFICANT CHANGE UP (ref 0–0.04)
VANCOMYCIN TROUGH SERPL-MCNC: 29.2 UG/ML — CRITICAL HIGH (ref 10–20)
WBC # BLD: 13.22 K/UL — HIGH (ref 3.8–10.5)
WBC # BLD: 13.83 K/UL — HIGH (ref 3.8–10.5)
WBC # BLD: 14.15 K/UL — HIGH (ref 3.8–10.5)
WBC # BLD: 14.43 K/UL — HIGH (ref 3.8–10.5)
WBC # FLD AUTO: 13.22 K/UL — HIGH (ref 3.8–10.5)
WBC # FLD AUTO: 13.83 K/UL — HIGH (ref 3.8–10.5)
WBC # FLD AUTO: 14.15 K/UL — HIGH (ref 3.8–10.5)
WBC # FLD AUTO: 14.43 K/UL — HIGH (ref 3.8–10.5)

## 2020-07-31 PROCEDURE — 88304 TISSUE EXAM BY PATHOLOGIST: CPT | Mod: 26

## 2020-07-31 PROCEDURE — 93010 ELECTROCARDIOGRAM REPORT: CPT

## 2020-07-31 PROCEDURE — 99291 CRITICAL CARE FIRST HOUR: CPT

## 2020-07-31 RX ORDER — PIPERACILLIN AND TAZOBACTAM 4; .5 G/20ML; G/20ML
3.38 INJECTION, POWDER, LYOPHILIZED, FOR SOLUTION INTRAVENOUS EVERY 12 HOURS
Refills: 0 | Status: DISCONTINUED | OUTPATIENT
Start: 2020-07-31 | End: 2020-08-03

## 2020-07-31 RX ORDER — HYDROMORPHONE HYDROCHLORIDE 2 MG/ML
1 INJECTION INTRAMUSCULAR; INTRAVENOUS; SUBCUTANEOUS EVERY 4 HOURS
Refills: 0 | Status: DISCONTINUED | OUTPATIENT
Start: 2020-07-31 | End: 2020-07-31

## 2020-07-31 RX ORDER — SODIUM CHLORIDE 9 MG/ML
1000 INJECTION, SOLUTION INTRAVENOUS
Refills: 0 | Status: DISCONTINUED | OUTPATIENT
Start: 2020-07-31 | End: 2020-07-31

## 2020-07-31 RX ORDER — GLUCAGON INJECTION, SOLUTION 0.5 MG/.1ML
1 INJECTION, SOLUTION SUBCUTANEOUS ONCE
Refills: 0 | Status: DISCONTINUED | OUTPATIENT
Start: 2020-07-31 | End: 2020-08-01

## 2020-07-31 RX ORDER — MORPHINE SULFATE 50 MG/1
2 CAPSULE, EXTENDED RELEASE ORAL EVERY 6 HOURS
Refills: 0 | Status: DISCONTINUED | OUTPATIENT
Start: 2020-07-31 | End: 2020-07-31

## 2020-07-31 RX ORDER — SODIUM CHLORIDE 9 MG/ML
1000 INJECTION INTRAMUSCULAR; INTRAVENOUS; SUBCUTANEOUS
Refills: 0 | Status: DISCONTINUED | OUTPATIENT
Start: 2020-07-31 | End: 2020-07-31

## 2020-07-31 RX ORDER — HYDROMORPHONE HYDROCHLORIDE 2 MG/ML
0.5 INJECTION INTRAMUSCULAR; INTRAVENOUS; SUBCUTANEOUS
Refills: 0 | Status: DISCONTINUED | OUTPATIENT
Start: 2020-07-31 | End: 2020-07-31

## 2020-07-31 RX ORDER — POTASSIUM CHLORIDE 20 MEQ
10 PACKET (EA) ORAL
Refills: 0 | Status: DISCONTINUED | OUTPATIENT
Start: 2020-07-31 | End: 2020-07-31

## 2020-07-31 RX ORDER — DEXTROSE 50 % IN WATER 50 %
25 SYRINGE (ML) INTRAVENOUS ONCE
Refills: 0 | Status: DISCONTINUED | OUTPATIENT
Start: 2020-07-31 | End: 2020-07-31

## 2020-07-31 RX ORDER — DEXTROSE 50 % IN WATER 50 %
15 SYRINGE (ML) INTRAVENOUS ONCE
Refills: 0 | Status: DISCONTINUED | OUTPATIENT
Start: 2020-07-31 | End: 2020-07-31

## 2020-07-31 RX ORDER — PIPERACILLIN AND TAZOBACTAM 4; .5 G/20ML; G/20ML
2.25 INJECTION, POWDER, LYOPHILIZED, FOR SOLUTION INTRAVENOUS EVERY 8 HOURS
Refills: 0 | Status: DISCONTINUED | OUTPATIENT
Start: 2020-07-31 | End: 2020-07-31

## 2020-07-31 RX ORDER — PANTOPRAZOLE SODIUM 20 MG/1
40 TABLET, DELAYED RELEASE ORAL
Refills: 0 | Status: DISCONTINUED | OUTPATIENT
Start: 2020-07-31 | End: 2020-08-04

## 2020-07-31 RX ORDER — MIDODRINE HYDROCHLORIDE 2.5 MG/1
10 TABLET ORAL EVERY 8 HOURS
Refills: 0 | Status: DISCONTINUED | OUTPATIENT
Start: 2020-07-31 | End: 2020-08-02

## 2020-07-31 RX ORDER — DEXTROSE 50 % IN WATER 50 %
12.5 SYRINGE (ML) INTRAVENOUS ONCE
Refills: 0 | Status: DISCONTINUED | OUTPATIENT
Start: 2020-07-31 | End: 2020-07-31

## 2020-07-31 RX ORDER — CHLORHEXIDINE GLUCONATE 213 G/1000ML
1 SOLUTION TOPICAL
Refills: 0 | Status: DISCONTINUED | OUTPATIENT
Start: 2020-07-31 | End: 2020-08-07

## 2020-07-31 RX ORDER — METOPROLOL TARTRATE 50 MG
25 TABLET ORAL DAILY
Refills: 0 | Status: DISCONTINUED | OUTPATIENT
Start: 2020-07-31 | End: 2020-07-31

## 2020-07-31 RX ORDER — ONDANSETRON 8 MG/1
4 TABLET, FILM COATED ORAL ONCE
Refills: 0 | Status: DISCONTINUED | OUTPATIENT
Start: 2020-07-31 | End: 2020-07-31

## 2020-07-31 RX ORDER — FENTANYL CITRATE 50 UG/ML
25 INJECTION INTRAVENOUS
Refills: 0 | Status: DISCONTINUED | OUTPATIENT
Start: 2020-07-31 | End: 2020-07-31

## 2020-07-31 RX ORDER — NOREPINEPHRINE BITARTRATE/D5W 8 MG/250ML
0.1 PLASTIC BAG, INJECTION (ML) INTRAVENOUS
Qty: 16 | Refills: 0 | Status: DISCONTINUED | OUTPATIENT
Start: 2020-07-31 | End: 2020-08-02

## 2020-07-31 RX ORDER — HYDROMORPHONE HYDROCHLORIDE 2 MG/ML
1 INJECTION INTRAMUSCULAR; INTRAVENOUS; SUBCUTANEOUS
Refills: 0 | Status: DISCONTINUED | OUTPATIENT
Start: 2020-07-31 | End: 2020-08-01

## 2020-07-31 RX ORDER — INSULIN LISPRO 100/ML
VIAL (ML) SUBCUTANEOUS
Refills: 0 | Status: DISCONTINUED | OUTPATIENT
Start: 2020-07-31 | End: 2020-08-03

## 2020-07-31 RX ADMIN — PIPERACILLIN AND TAZOBACTAM 25 GRAM(S): 4; .5 INJECTION, POWDER, LYOPHILIZED, FOR SOLUTION INTRAVENOUS at 05:08

## 2020-07-31 RX ADMIN — PANTOPRAZOLE SODIUM 40 MILLIGRAM(S): 20 TABLET, DELAYED RELEASE ORAL at 05:08

## 2020-07-31 RX ADMIN — CHLORHEXIDINE GLUCONATE 1 APPLICATION(S): 213 SOLUTION TOPICAL at 14:13

## 2020-07-31 RX ADMIN — MIDODRINE HYDROCHLORIDE 10 MILLIGRAM(S): 2.5 TABLET ORAL at 22:00

## 2020-07-31 RX ADMIN — HYDROMORPHONE HYDROCHLORIDE 1 MILLIGRAM(S): 2 INJECTION INTRAMUSCULAR; INTRAVENOUS; SUBCUTANEOUS at 17:33

## 2020-07-31 RX ADMIN — PIPERACILLIN AND TAZOBACTAM 25 GRAM(S): 4; .5 INJECTION, POWDER, LYOPHILIZED, FOR SOLUTION INTRAVENOUS at 17:01

## 2020-07-31 RX ADMIN — MIDODRINE HYDROCHLORIDE 10 MILLIGRAM(S): 2.5 TABLET ORAL at 09:57

## 2020-07-31 RX ADMIN — MIDODRINE HYDROCHLORIDE 10 MILLIGRAM(S): 2.5 TABLET ORAL at 14:13

## 2020-07-31 RX ADMIN — Medication 3.78 MICROGRAM(S)/KG/MIN: at 09:42

## 2020-07-31 RX ADMIN — HYDROMORPHONE HYDROCHLORIDE 1 MILLIGRAM(S): 2 INJECTION INTRAMUSCULAR; INTRAVENOUS; SUBCUTANEOUS at 10:30

## 2020-07-31 RX ADMIN — HYDROMORPHONE HYDROCHLORIDE 1 MILLIGRAM(S): 2 INJECTION INTRAMUSCULAR; INTRAVENOUS; SUBCUTANEOUS at 08:10

## 2020-07-31 RX ADMIN — HYDROMORPHONE HYDROCHLORIDE 1 MILLIGRAM(S): 2 INJECTION INTRAMUSCULAR; INTRAVENOUS; SUBCUTANEOUS at 09:57

## 2020-07-31 RX ADMIN — HYDROMORPHONE HYDROCHLORIDE 1 MILLIGRAM(S): 2 INJECTION INTRAMUSCULAR; INTRAVENOUS; SUBCUTANEOUS at 07:38

## 2020-07-31 RX ADMIN — Medication 100 MILLIEQUIVALENT(S): at 03:25

## 2020-07-31 RX ADMIN — HYDROMORPHONE HYDROCHLORIDE 1 MILLIGRAM(S): 2 INJECTION INTRAMUSCULAR; INTRAVENOUS; SUBCUTANEOUS at 17:51

## 2020-07-31 NOTE — BRIEF OPERATIVE NOTE - NSICDXBRIEFPOSTOP_GEN_ALL_CORE_FT
POST-OP DIAGNOSIS:  Infected prosthetic vascular graft 31-Jul-2020 00:24:58  Haylee Quijano  Pseudoaneurysm of arteriovenous graft 31-Jul-2020 00:25:05  Haylee Quijano

## 2020-07-31 NOTE — CHART NOTE - NSCHARTNOTEFT_GEN_A_CORE
Upon Nutritional Assessment by the Registered Dietitian your patient was determined to meet criteria / has evidence of the following diagnosis/diagnoses:          [ ]  Mild Protein Calorie Malnutrition        [ ]  Moderate Protein Calorie Malnutrition        [x ] Severe Protein Calorie Malnutrition        [ ] Unspecified Protein Calorie Malnutrition        [x ] Underweight / BMI <19        [ ] Morbid Obesity / BMI > 40      Findings as based on:  •  Comprehensive nutrition assessment and consultation  •  Calorie counts (nutrient intake analysis)  •  Food acceptance and intake status from observations by staff  •  Follow up  •  Patient education  •  Intervention secondary to interdisciplinary rounds  •   concerns      Treatment:    The following diet has been recommended:  Johnro BID added    PROVIDER Section:     By signing this assessment you are acknowledging and agree with the diagnosis/diagnoses assigned by the Registered Dietitian    Comments:

## 2020-07-31 NOTE — PROGRESS NOTE ADULT - ASSESSMENT
Patient is a 69y old  Female from [facility], with PMH of HTN, DM, Hyperphosphatemia,  Oral cancer(s/p excision), ESRD on HD via AVF  which created 3-4 years ago s/p exploration of AVF, interposition graft 6/2017, send in  to the ER for evaluation of  left arm AV fistula pain and swelling x1 week. Last week when she went for her HD session and developed gradual onset of swelling and pain at the AVF site on L arm. As per the patient the HD nurse had inserted the needle incorrectly that caused the swelling and severe pain. The AVF was not occluded and she had her HD sessions as per the schedule Tu/Th/Sat with her last HD session being yesterday.  On admission, she has no fever or Leukocytosis, but admission blood cultures growing GPC in clusters. She has started on Zosyn and Vancomycin and the ID consult requested to assist with further evaluation and antibiotic management.      # Bacteremia- 7/29/20- GPC in clusters- ? source AVG  # Infected AVG with surrounding cellulitis    would recommend:    1. Follow up intra-op AVG culture  2. Vancomycin level in AM and re-dose if level less than 20  and c/w Zosyn  3. Follow up final blood cultures for sensitivity of ConS  4. Follow up Repeat Blood cultures to document clearing the blood stream  5. Monitor WBC count     d/w ICU team    Attending Attestation:    Spent more than 45 minutes on total encounter, more than 50 % of the visit was spent counseling and/or coordinating care by the Attending physician. Patient is a 69y old  Female from [facility], with PMH of HTN, DM, Hyperphosphatemia,  Oral cancer(s/p excision), ESRD on HD via AVF  which created 3-4 years ago s/p exploration of AVF, interposition graft 6/2017, send in  to the ER for evaluation of  left arm AV fistula pain and swelling x1 week. Last week when she went for her HD session and developed gradual onset of swelling and pain at the AVF site on L arm. As per the patient the HD nurse had inserted the needle incorrectly that caused the swelling and severe pain. The AVF was not occluded and she had her HD sessions as per the schedule Tu/Th/Sat with her last HD session being yesterday.  On admission, she has no fever or Leukocytosis, but admission blood cultures growing GPC in clusters. She has started on Zosyn and Vancomycin and the ID consult requested to assist with further evaluation and antibiotic management.      # Bacteremia- 7/29/20- Coagulase Negative Staph. - ? source AVG  # Infected AVG with surrounding cellulitis - s/p ligation of Left  AVG pseudoaneurysm 7/30  # Septic shock- Post-op 7/30- transferred to ICU since requiring pressor    would recommend:    1. Follow up intra-op AVG culture  2. Vancomycin level in AM and re-dose if level less than 30  and c/w Zosyn  3. Follow up final blood cultures for sensitivity of CoNS  4. Follow up Repeat Blood cultures to document clearing the blood stream  5. Monitor WBC count   5. TTE     d/w ICU team    Attending Attestation:    Spent more than 45 minutes on total encounter, more than 50 % of the visit was spent counseling and/or coordinating care by the Attending physician.

## 2020-07-31 NOTE — CONSULT NOTE ADULT - SUBJECTIVE AND OBJECTIVE BOX
Chief complain/HPI  ESRD for 4 years on dialysis, last dialysis was on .  She came to the ER for swelling  and pain in left AVF .  Patient underwent surgery excision of fistula.  Culture - Blood (20 @ 23:02)    Gram Stain:   Growth in anaerobic bottle: Gram Positive Cocci in Clusters  Coagulase negative  Dialysis catheter was placed in right femoral area.  In AM she was checked by surgery for ? compartment syndrome .  Her hand was swollen and cold, pulses were there.    She says her BP is normal.  Reduced appetite in the last year and weightn loss. No vomit or nausea  Able to urinate, like normal.  No history of hypotension          PAST MEDICAL & SURGICAL HISTORY:  DM (diabetes mellitus)  Vitamin D deficiency  ESRD (end stage renal disease)  Hyperphosphatemia  Hypertension  S/P bunionectomy: bilateral great toes  H/O hemorrhoidectomy  H/O oral cancer  AVF (arteriovenous fistula): x3      Home Medications Reviewed    Hospital Medications:   MEDICATIONS  (STANDING):  HYDROmorphone  Injectable 1 milliGRAM(s) IV Push every 4 hours  insulin lispro (HumaLOG) corrective regimen sliding scale   SubCutaneous Before meals and at bedtime  metoprolol succinate ER 25 milliGRAM(s) Oral daily  midodrine 10 milliGRAM(s) Oral every 8 hours  norepinephrine Infusion 0.1 MICROgram(s)/kG/Min (3.78 mL/Hr) IV Continuous <Continuous>  pantoprazole    Tablet 40 milliGRAM(s) Oral before breakfast  piperacillin/tazobactam IVPB.. 3.375 Gram(s) IV Intermittent every 12 hours    MEDICATIONS  (PRN):  glucagon  Injectable 1 milliGRAM(s) IntraMuscular once PRN Glucose <70 milliGRAM(s)/deciLiter  HYDROmorphone  Injectable 1 milliGRAM(s) IV Push every 3 hours PRN Severe Pain (7 - 10)      Allergies    sulfADIAZINE (Angioedema)    Intolerances      Comprehensive Metabolic Panel (20 @ 16:23)    Sodium, Serum: 137 mmol/L    Potassium, Serum: 3.0 mmol/L    Chloride, Serum: 97 mmol/L    Carbon Dioxide, Serum: 33 mmol/L    Anion Gap, Serum: 7 mmol/L    Blood Urea Nitrogen, Serum: 10 mg/dL    Creatinine, Serum: 2.86 mg/dL    Glucose, Serum: 93 mg/dL    Calcium, Total Serum: 8.2 mg/dL    Protein Total, Serum: 5.8 g/dL    Albumin, Serum: 1.6 g/dL    Bilirubin Total, Serum: 0.5 mg/dL    Alkaline Phosphatase, Serum: 230 U/L    Aspartate Aminotransferase (AST/SGOT): 26 U/L    Alanine Aminotransferase (ALT/SGPT): 24 U/L DA    eGFR if Non : 16: Interpretative comment                            9.0    13.22 )-----------( 188      ( 2020 08:16 )             26.8         134<L>  |  98  |  17  ----------------------------<  120<H>  3.7   |  29  |  3.84<H>    Ca    8.4      2020 08:16  Phos  2.1       Mg     1.6         TPro  5.6<L>  /  Alb  1.4<L>  /  TBili  0.6  /  DBili  x   /  AST  22  /  ALT  21  /  AlkPhos  216<H>      PT/INR - ( 2020 02:45 )   PT: 16.7 sec;   INR: 1.45 ratio         PTT - ( 2020 16:23 )  PTT:35.4 sec  Urinalysis Basic - ( 2020 17:02 )    Color: Red / Appearance: bloody / S.015 / pH: x  Gluc: x / Ketone: Negative  / Bili: Small / Urobili: Negative   Blood: x / Protein: 100 / Nitrite: Negative   Leuk Esterase: Moderate / RBC: >50 /HPF / WBC 11-25 /HPF   Sq Epi: x / Non Sq Epi: Few /HPF / Bacteria: Moderate /HPF    PROCEDURE DATE:  2020          INTERPRETATION:  XR CHEST IMMEDIATE    Single AP view    HISTORY:  Sepsis    Comparison: none.      The cardiac silhouette is within normal limits. The lungs are clear. No pleural abnormality.    IMPRESSION: Clear lungs.            SOCIAL HISTORY: Denies ETOh,Smoking,     FAMILY HISTORY:  Family history of diabetes mellitus in sister (Sibling): and HTN      REVIEW OF SYSTEMS:  CONSTITUTIONAL: No malaise, No fatigue, No fevers or chills, well developed, no diaphoresis  EYES/ENT: No visual changes;  No vertigo or throat pain   NECK: No pain or stiffness  RESPIRATORY: No cough, wheezing, hemoptysis; No shortness of breath  CARDIOVASCULAR: No chest pain or palpitations. No edema  GASTROINTESTINAL: as above  GENITOURINARY: No dysuria, frequency, foamy urine, urinary urgency, incontinence or hematuria  NEUROLOGICAL: No numbness or weakness, No tremor  SKIN: No itching, burning, rashes, or lesions   VASCULAR: No claudication  Musculoskeletal: no arthralgia, no myalgia  All other review of systems is negative unless indicated above.    VITALS:  Vital Signs Last 24 Hrs  T(C): 35.6 (2020 08:00), Max: 36.8 (2020 19:55)  T(F): 96 (2020 08:00), Max: 98.3 (2020 19:55)  HR: 66 (2020 09:46) (62 - 128)  BP: 134/92 (2020 09:47) (77/62 - 194/99)  BP(mean): 65 (2020 09:46) (65 - 97)  RR: 14 (2020 09:46) (11 - 20)  SpO2: 100% (2020 05:00) (100% - 100%)     @ 07:01  -   @ 07:00  --------------------------------------------------------  IN: 600 mL / OUT: 0 mL / NET: 600 mL    [facility] 87-90 systolic    Weight (kg): 40.3 ( @ 01:35)    PHYSICAL EXAM:  Constitutional: NAD  HEENT: anicteric sclera, oropharynx clear, MMM  Neck: No JVD  Respiratory: good air entrance B/L, no wheezes, rales or rhonchi  Cardiovascular: S1, S2, RRR, no pericardial rub, no murmur  Gastrointestinal: BS+, soft, no tenderness, no distension, no bruit  Pelvis: bladder non-distended, no CVA tenderness  Extremities: No cyanosis or clubbing. No peripheral edema in lower extermities  Left hand swollen , both hand cold same way., no cyanosis  Able to move her fingers.    Right femoral catheter

## 2020-07-31 NOTE — BRIEF OPERATIVE NOTE - NSICDXBRIEFPREOP_GEN_ALL_CORE_FT
PRE-OP DIAGNOSIS:  Infected prosthetic vascular graft 31-Jul-2020 00:24:53  Haylee Quijano  Pseudoaneurysm of arteriovenous graft 31-Jul-2020 00:24:28  Haylee Quijano

## 2020-07-31 NOTE — CONSULT NOTE ADULT - ASSESSMENT
68yo female with multiple comorbidities including h/o HTN, DM type 2, ESRD on HD admitted with AV fistula swelling and pain    Endocrinology consulted for glycemic management    DM type 2  complicated by ESRD on HD, sepsis- bacteremia, requiring pressor current admit  NH regimen:  humalog sliding scale    recommendations:  - low dose sliding scale  - reassess based on requirements  - goal inpatient glucose range: 140-180mg/dl    sepsis  bacteremia  on vancomycin  s/p AV repair/ligation  ID on board  requiring pressor    ESRD on HD  cautious insulin dosing  may need modified sliding scale    Discussed with primary team  Please call - Endocrine- Dr Mónica Cm as needed    Time spent evaluating patient including coordination of care- 35mins.

## 2020-07-31 NOTE — PROGRESS NOTE ADULT - ASSESSMENT
68 y/o Female s/p ligation of L AVG pseudoaneurysm 7/30, s/p rt fem shiley 7/30    -Daily dressing changes   -Vascular checks q1Hr   -UE elevation   -HD as per renal   -Care as per ICU   -Will follow

## 2020-07-31 NOTE — CONSULT NOTE ADULT - ASSESSMENT
Assessment:  Coag neg staph bacteremia   AVG infection s/p excision and ligation   AVG pseudomelanoma s/p excision and ligation   ESRD on HD   DM  Hyperphospheremia   HTN        Plan:    =====================[CNS ]==============================  # No issues:   - AAOx3 at baseline  - Pain control with hydromorphone     =====================[CVS ]===============================  #   - Hold BP medications    =====================[RESP ]==============================  # Acute Hypoxic Resp Failure 2/2     =====================[ GI ]================================  #    - NPO  for now     ====================[ RENAL ]=============================   #   - monitor urine output   - I&Os  - Monitor electrolytes     =====================[ ID ]================================  #     ===================[ HEME/ONC ]===========================  # No issues :  - Hb and Plt stable   - monitor cbc daily     =====================[ ENDO ]=============================  # No history of DM  - target CBG < 180  - FS q6 while NPO  - Start diet when possible    ==================[ PROPHYLAXIS ]==========================   # Dvt : Lovenox   # Gi : Protonix     ====================[ DISPO ]==============================   - Monitor in ICU     ===================[ PROGNOSIS ]===========================  - Guarded 69F ESRD on HD, HTN, DM, Oral ca came in L Arm AVG infection coag neg staph and pseudoaneurysm, pt was taken to OR for AVG excision and ligation. Pt was brought to icu for post op monitoring.       Assessment:  Coag neg staph bacteremia   AVG infection s/p excision   AVG pseudoaneurysm s/p excision and ligation   ESRD on HD   DM  Hypoglycemia   Hyperphospheremia   HTN  Anemia         Plan:    =====================[CNS ]==============================  # No issues:   - AAOx3 at baseline  - Pain control with morphine     =====================[CVS ]===============================  # HTN  - on metoprolol     =====================[RESP ]==============================  # No issue   lung clear on cxr     =====================[ GI ]================================  #  GI  - Protonix for GI ppx    - on diet      ====================[ RENAL ]=============================   # ESRD on HD last HD was on 7/28  - has right femoral Liv  - f/u Dr. Flores for next HD  - C/w phoslo for hyperphosphatemia    - Monitor electrolytes      =====================[ ID ]================================  # Bacteremia (coag neg staph) likely from AV graft infection   - s/p removal of AV graft on 7/30   - Currently on vanc and zosyn   - last dose of vanc was on 7/30   - f/u pre dialysis vancomycin level, given vancomycin 1gm intradialysis if vanc level <20   - will order echo to r/o endocarditis   - F/u culture sensitivity   - will send repeat blood culture   - ID Dr. Boston     ==================[ Vascular ]==========================   # AVG pseudoaneurysm   - s/p AVG removal and ligation   - monitor for sings of bleeding   - cbc q6  - vascular surgeon Dr. Manzanares     ===================[ HEME/ONC ]===========================  # Anemia likely from CKD, presented with hb 7.1 s/p 1 prbc currently hb 9.6  - monitor cbc q6 for sings of bleeding      =====================[ ENDO ]=============================  # hx of DM on hss at home   - target CBG >140 and < 180  - FS achs    ==================[ PROPHYLAXIS ]==========================   # Dvt : will hold chemical dvt ppx for now given concern for bleeding    # Gi : Protonix     ====================[ DISPO ]==============================   - Monitor in ICU     ===================[ PROGNOSIS ]===========================  - Guarded 69F ESRD on HD, HTN, DM, Oral ca came in L Arm AVG infection coag neg staph and pseudoaneurysm, pt was taken to OR for AVG excision and ligation. Pt was brought to icu for post op monitoring.       Assessment:  Coag neg staph bacteremia   AVG infection s/p excision   AVG pseudoaneurysm s/p excision and ligation   ESRD on HD   DM  Hypoglycemia   Hyperphospheremia   HTN  Anemia         Plan:    =====================[CNS ]==============================  # No issues:   - AAOx3 at baseline  - Pain control with morphine     =====================[CVS ]===============================  # HTN  - on metoprolol     =====================[RESP ]==============================  # No issue   lung clear on cxr     =====================[ GI ]================================  #  GI  - Protonix for GI ppx    - on diet      ====================[ RENAL ]=============================   # ESRD on HD last HD was on 7/28  - has right femoral Liv  - f/u Dr. Flores for next HD  - C/w phoslo for hyperphosphatemia    - Monitor electrolytes      =====================[ ID ]================================  # Bacteremia (coag neg staph) likely from AV graft infection   - s/p removal of AV graft on 7/30   - Currently on vanc and zosyn   - last dose of vanc was on 7/30   - f/u pre dialysis vancomycin level, given vancomycin 1gm intradialysis if vanc level <20   - will order echo to r/o endocarditis   - F/u culture sensitivity   - will send repeat blood culture   - ID Dr. Boston     ==================[ Vascular ]==========================   # AVG pseudoaneurysm   - s/p AVG removal and ligation   - monitor for sings of bleeding   - cbc q6  - vascular surgeon Dr. Manzanares     ===================[ HEME/ONC ]===========================  # Anemia likely from CKD, presented with hb 7.1 s/p 1 prbc currently hb 9.6  - epogen during dialysis   - monitor cbc q6 for sings of bleeding      =====================[ ENDO ]=============================  # hx of DM on hss at home   - target CBG >140 and < 180  - FS achs    ==================[ PROPHYLAXIS ]==========================   # Dvt : will hold chemical dvt ppx for now given concern for bleeding    # Gi : Protonix     ====================[ DISPO ]==============================   - Monitor in ICU     ===================[ PROGNOSIS ]===========================  - Guarded

## 2020-07-31 NOTE — BRIEF OPERATIVE NOTE - NSICDXBRIEFPROCEDURE_GEN_ALL_CORE_FT
PROCEDURES:  Excision, infected graft, extremity 31-Jul-2020 00:24:15  Haylee Quijano  Ligation of arteriovenous fistula or access graft of upper extremity 31-Jul-2020 00:23:51  Haylee Quijano

## 2020-07-31 NOTE — DIETITIAN INITIAL EVALUATION ADULT. - PERTINENT MEDS FT
MEDICATIONS  (STANDING):  chlorhexidine 2% Cloths 1 Application(s) Topical <User Schedule>  insulin lispro (HumaLOG) corrective regimen sliding scale   SubCutaneous Before meals and at bedtime  metoprolol succinate ER 25 milliGRAM(s) Oral daily  midodrine 10 milliGRAM(s) Oral every 8 hours  norepinephrine Infusion 0.1 MICROgram(s)/kG/Min (3.78 mL/Hr) IV Continuous <Continuous>  pantoprazole    Tablet 40 milliGRAM(s) Oral before breakfast  piperacillin/tazobactam IVPB.. 3.375 Gram(s) IV Intermittent every 12 hours    MEDICATIONS  (PRN):  glucagon  Injectable 1 milliGRAM(s) IntraMuscular once PRN Glucose <70 milliGRAM(s)/deciLiter  HYDROmorphone  Injectable 1 milliGRAM(s) IV Push every 3 hours PRN Severe Pain (7 - 10)

## 2020-07-31 NOTE — PROGRESS NOTE ADULT - ASSESSMENT
68 yo F ESRD on HD, HTN, DM, Oral ca came in L Arm AVG infection coag neg staph and pseudoaneurysm, pt was taken to OR for AVG excision and ligation. Pt was brought to ICU for post op monitoring.       Assessment:  Coag neg staph bacteremia   AVG infection s/p excision   AVG pseudoaneurysm s/p excision and ligation   ESRD on HD, last HD 7/28/20  DM  Hypoglycemia   Hyperphospheremia   HTN  Anemia       CNS  # No issues:   - AAOx3 at baseline  - Pain control with morphine     CVS  # HTN  - on metoprolol     Resp  # No issue   lung clear on cxr     GI  - Protonix for GI ppx    - on diet      Renal  # ESRD on HD last HD was on 7/28  - has right femoral Shiley  - f/u Dr. Flores for next HD  - C/w phoslo for hyperphosphatemia    - Monitor electrolytes      ID  # Bacteremia (coag neg staph) likely from AV graft infection   - s/p removal of AV graft on 7/30   - Currently on vanc and zosyn   - last dose of vanc was on 7/30   - f/u pre dialysis vancomycin level, given vancomycin 1gm intradialysis if vanc level <20   - will order echo to r/o endocarditis   - F/u culture sensitivity   - will send repeat blood culture   - ID Dr. Ludy Duran   # AVG pseudoaneurysm   - s/p AVG removal and ligation   - monitor for sings of bleeding   - cbc q6  - vascular surgeon Dr. Manzanares     Heme onco  # Anemia likely from CKD, presented with hb 7.1 s/p 1 prbc currently hb 9.6  - epogen during dialysis   - monitor cbc q6 for sings of bleeding      Endo  # hx of DM on hss at home   - target CBG >140 and < 180  - FS achs    Prophylaxis   # Dvt : will hold chemical dvt ppx for now given concern for bleeding    # Gi : Protonix 68 yo F ESRD on HD, HTN, DM, Oral ca came in L Arm AVG infection coag neg staph and pseudoaneurysm, pt was taken to OR for AVG excision and ligation. Pt was brought to ICU for post op monitoring.       Assessment:  Coag neg staph bacteremia   AVG infection s/p excision   AVG pseudoaneurysm s/p excision and ligation 7/30/20  ESRD on HD, last HD 7/28/20  DM  Hypoglycemia resolved    HTN  Anemia of chronic disease       CNS  # No issues:   - AAOx3 at baseline  - Pain control with hydromorphone     CVS  # HTN  - Holding anti hypertensive meds   -Hypotension  -Will start NE and Midodrine     Resp  # No issue   -lungs clear on cxr     GI  - Protonix for GI ppx    - C/w renal diet     Renal  # ESRD on HD last HD was on 7/28  - has right femoral Shiley   - Monitor electrolytes    - f/u Dr. Flores, recommends no HD for now     ID  # Bacteremia (coag neg staph) likely from AV graft infection   - s/p removal of AV graft on 7/30   - Currently on vanc and zosyn   - last dose of vanc was on 7/30   - f/u pre dialysis vancomycin level, given vancomycin 1gm intra dialysis if vanc level <20   - F/u echo to r/o endocarditis   - F/u culture sensitivity   - F/u repeat blood culture   - ID Dr. Ludy Duran   # AVG pseudoaneurysm   - s/p AVG removal and ligation 7/30/20  - monitor for sings of bleeding   - cbc q6  - vascular surgeon Dr. Manzanares     Heme onco  # Anemia likely from CKD, presented with hb 7.1 s/p 1 prbc currently hb 9.6  - epogen during dialysis   - monitor cbc q6 for sings of bleeding      Endo  # hx of DM on hss at home   - target CBG >140 and < 180  - FS achs    Prophylaxis   - Dvt : will hold chemical dvt ppx for now given concern for bleeding, on SCD prophylaxis *   - GI : Protonix 70 yo F ESRD on HD, HTN, DM, Oral ca came in L Arm AVG infection coag neg staph and pseudoaneurysm, pt was taken to OR for AVG excision and ligation. Pt was brought to ICU for post op monitoring.       Assessment:  Coag neg staph bacteremia   AVG infection s/p excision   AVG pseudoaneurysm s/p excision and ligation 7/30/20  ESRD on HD, last HD 7/28/20  DM  Hypoglycemia resolved    HTN  Anemia of chronic disease       CNS  # No issues:   - AAOx3 at baseline  - Pain control with hydromorphone     CVS  # HTN  - Holding anti hypertensive meds   -Hypotension  -Will start NE and Midodrine     Resp  # No issue   -lungs clear on cxr   -Incentive spirometer     GI  - Protonix for GI ppx    - C/w renal diet     Renal  # ESRD on HD last HD was on 7/28  - has right femoral Shiley   - Monitor electrolytes    - f/u Dr. Flores, recommends no HD for now     ID  # Bacteremia (coag neg staph) likely from AV graft infection   - s/p removal of AV graft on 7/30   - Currently on vanc and zosyn   - last dose of vanc was on 7/30   - Vanc level 29 (7/31/20) holding vanco for now ****  - f/u pre dialysis vancomycin level, given vancomycin 1gm intra dialysis if vanc level <20   - F/u echo to r/o endocarditis   - F/u culture sensitivity   - F/u repeat blood culture   - ID Dr. Ludy Duran   # AVG pseudoaneurysm   - s/p AVG removal and ligation 7/30/20  - monitor for sings of bleeding   - cbc q6  - Check pulses q 2hrs   - vascular surgeon Dr. Manzanares     Heme onco  # Anemia likely from CKD, presented with hb 7.1 s/p 1 prbc currently hb 9.6  - epogen during dialysis   - monitor cbc q6 for sings of bleeding      Endo  # hx of DM on hss at home   - target CBG >140 and < 180  - FS achs    Prophylaxis   - Dvt : will hold chemical dvt ppx for now given concern for bleeding, on SCD prophylaxis *   - GI : Protonix 70 yo F ESRD on HD, HTN, DM, Oral ca came in L Arm AVG infection coag neg staph and pseudoaneurysm, pt was taken to OR for AVG excision and ligation. Pt was brought to ICU for post op monitoring.       Assessment:  Coag neg staph bacteremia   AVG infection s/p excision   AVG pseudoaneurysm s/p excision and ligation 7/30/20  ESRD on HD, last HD 7/28/20  DM  Hypoglycemia resolved    HTN  Anemia of chronic disease       CNS  # No issues:   - AAOx3 at baseline  - Pain control with hydromorphone     CVS  # HTN  - Holding anti hypertensive meds   -Hypotension  -Will start NE and Midodrine     Resp  # No issue   -lungs clear on cxr   -Incentive spirometer     GI  - Protonix for GI ppx    - C/w renal diet     Renal  # ESRD on HD last HD was on 7/28  - has right femoral Shiley   - Monitor electrolytes    - f/u Dr. Flores, recommends no HD for now     ID  # Bacteremia (coag neg staph) likely from AV graft infection   - s/p removal of AV graft on 7/30   - Currently on vanc and zosyn   - last dose of vanc was on 7/30   - Lactate 2.3  - Vanc level 29 (7/31/20) holding vanco for now ****  - f/u pre dialysis vancomycin level, given vancomycin 1gm intra dialysis if vanc level <20   - F/u echo to r/o endocarditis   - F/u culture sensitivity   - F/u repeat blood culture   - ID Dr. Ludy Duran   # AVG pseudoaneurysm   - s/p AVG removal and ligation 7/30/20  - monitor for sings of bleeding   - cbc q6  - Check pulses q 2hrs   - vascular surgeon Dr. Manzanares     Heme onco  # Anemia likely from CKD, presented with hb 7.1 s/p 1 prbc currently hb 9.6  - epogen during dialysis   - monitor cbc q6 for sings of bleeding      Endo  # hx of DM on hss at home   - target CBG >140 and < 180  - FS achs    Prophylaxis   - Dvt : will hold chemical dvt ppx for now given concern for bleeding, on SCD prophylaxis *   - GI : Protonix

## 2020-07-31 NOTE — PROGRESS NOTE ADULT - SUBJECTIVE AND OBJECTIVE BOX
Patient is seen and examined at the bed side, is afebrile.  She is s/p ligation of Left  AVG pseudoaneurysm , s/p rt fem moshe , Post-op transferred to ICU since became Hypotensive in OR. Currently on pressor.        REVIEW OF SYSTEMS: All other review systems are negative          ALLERGIES: sulfADIAZINE (Angioedema)        ICU Vital Signs Last 24 Hrs  T(C): 36.1 (2020 14:30), Max: 36.8 (2020 00:37)  T(F): 97 (2020 14:30), Max: 98.2 (2020 00:37)  HR: 57 (2020 20:00) (55 - 128)  BP: 124/81 (2020 20:00) (75/59 - 194/90)  BP(mean): 91 (2020 20:00) (60 - 116)  ABP: --  ABP(mean): --  RR: 11 (2020 20:00) (0 - 21)  SpO2: 56% (2020 10:29) (56% - 100%)        PHYSICAL EXAM:  GENERAL: Not in distress   CHEST/LUNG:  Not using accessory muscles  HEART: s1 and s2 present  ABDOMEN:  Nontender and  Nondistended  EXTREMITIES: No pedal  edema, Left UE swollen and a blister at AVG site with tenderness   CNS: Awake and Alert          LABS:                        9.0    14.43 )-----------( 241      ( 2020 14:24 )             27.1                           8.2    9.11  )-----------( 124      ( 2020 06:31 )             24.2             134<L>  |  98  |  17  ----------------------------<  120<H>  3.7   |  29  |  3.84<H>    Ca    8.4      2020 08:16  Phos  2.1       Mg     1.6         TPro  5.6<L>  /  Alb  1.4<L>  /  TBili  0.6  /  DBili  x   /  AST  22  /  ALT  21  /  AlkPhos  216<H>      136  |  98  |  12  ----------------------------<  57<L>  3.4<L>   |  30  |  3.13<H>    Ca    8.2<L>      2020 06:31  Phos  1.9       Mg     1.7         TPro  5.8<L>  /  Alb  1.6<L>  /  TBili  0.5  /  DBili  x   /  AST  26  /  ALT  24  /  AlkPhos  230<H>      PT/INR - ( 2020 16:23 )   PT: 15.1 sec;   INR: 1.31 ratio      PTT - ( 2020 16:23 )  PTT:35.4 sec        CAPILLARY BLOOD GLUCOSE  POCT Blood Glucose.: 114 mg/dL (2020 18:11)  POCT Blood Glucose.: 111 mg/dL (2020 12:15)  POCT Blood Glucose.: 102 mg/dL (2020 10:21)  POCT Blood Glucose.: 77 mg/dL (2020 09:09)  POCT Blood Glucose.: 56 mg/dL (2020 09:07)        Urinalysis Basic - ( 2020 17:02 )  Color: Red / Appearance: bloody / S.015 / pH: x  Gluc: x / Ketone: Negative  / Bili: Small / Urobili: Negative   Blood: x / Protein: 100 / Nitrite: Negative   Leuk Esterase: Moderate / RBC: >50 /HPF / WBC 11-25 /HPF   Sq Epi: x / Non Sq Epi: Few /HPF / Bacteria: Moderate /HPF      Vancomycin Level, Trough (20 @ 08:16)    Vancomycin Level, Trough: 29.2: Vancomycin trough levels should be rapidly reached and maintained at  15-20 ug/ml for life threatening MRSA  infections such as sepsis, endocarditis, osteomyelitis and pneumonia.         MEDICATIONS  (STANDING):    chlorhexidine 2% Cloths 1 Application(s) Topical <User Schedule>  insulin lispro (HumaLOG) corrective regimen sliding scale   SubCutaneous Before meals and at bedtime  midodrine 10 milliGRAM(s) Oral every 8 hours  norepinephrine Infusion 0.1 MICROgram(s)/kG/Min (3.78 mL/Hr) IV Continuous <Continuous>  pantoprazole    Tablet 40 milliGRAM(s) Oral before breakfast  piperacillin/tazobactam IVPB.. 3.375 Gram(s) IV Intermittent every 12 hours        RADIOLOGY & ADDITIONAL TESTS:      < from: US Duplex Hemodialysis Access (20 @ 20:44) >  impression: The left upper extremity AVG (likely brachial artery-graft-cephalic vein) is patent. There is narrowing of the graft lumen due to the presence of thrombus, about 40% narrowing. 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    < end of copied text >      < from: Xray Chest 1 View-PORTABLE IMMEDIATE (20 @ 17:54) >  The cardiac silhouette is within normal limits. The lungs are clear. No pleural abnormality.        MICROBIOLOGY DATA:    COVID-19  Antibody - for prior infection screening (20 @ 10:08)    COVID-19 IgG Antibody Index: 7.70: Abbott CMIA  Negative Result    <= 1.39 Index  Positive Result      >= 1.40 Index Index    COVID-19 IgG Antibody Interpretation: Positive: This test has not been reviewed by the FDA by the standard review  process. It has been authorized for emergency use by the FDA. [employer] has validated this test to be accurate.  Negative results do not rule out SARS-CoV-2 infection, particularly in  those who have been in recent contact with the virus. Follow-up testing  with a molecular diagnostic test should be considered to rule out  infection in these individuals.  Results from antibody testing should not be used as thesole basis to  diagnose or exclude SARS-CoV-2 infection, or to inform infection status.  Positive results may rarely be due to past or present infection with  non-SARS-CoV-2 coronavirus strains, such as coronavirus HKU1, NL63, OC43,  or 229E. [employer], through extensive validation  testing, has confirmed that this risk is minimal with this test.      Culture - Urine (20 @ 01:34)    Specimen Source: .Urine Clean Catch (Midstream)    Culture Results:   >=3 organisms. Probable collection contamination.      Culture - Blood (20 @ 23:02)    -  Coagulase negative Staphylococcus: Detec    Gram Stain:   Growth in anaerobic bottle: Gram Positive Cocci in Clusters  Growth in aerobic bottle: Gram Positive Cocci in Clusters    Specimen Source: .Blood Blood-Peripheral    Organism: Blood Culture PCR    Culture Results:   Growth in aerobic and anaerobic bottles: Staphylococcus epidermidis  Susceptibility to follow.  "Due to technical problems, Proteus sp. will Not be reported as part of  the BCID panel until further notice"  ***Blood Panel PCR results on this specimen are available  approximately 3 hours after the Gram stain result.***  Gram stain, PCR, and/or culture results may not always  correspond due to difference in methodologies.  ************************************************************  This PCR assay was performed using [email or web address].  The following targets are tested for: Enterococcus,  vancomycin resistant enterococci, Listeria monocytogenes,  coagulase negative staphylococci, S. aureus,  methicillin resistant S. aureus, Streptococcus agalactiae  (Group B), S. pneumoniae, S. pyogenes (Group A),  Acinetobacter baumannii, Enterobacter cloacae, E. coli,  Klebsiella oxytoca, K. pneumoniae, Proteus sp.,  Serratia marcescens, Haemophilus influenzae,  Neisseria meningitidis, Pseudomonas aeruginosa, Candida  albicans, C. glabrata, C krusei, C parapsilosis,  C. tropicalis and the KPC resistance gene.    Organism Identification: Blood Culture PCR    Method Type: PCR      Culture - Blood (20 @ 23:02)    Gram Stain:   Growth in anaerobic bottle: Gram Positive Cocci in Clusters  Growth in aerobic bottle: Gram Positive Cocci in Clusters    -  Coagulase negative Staphylococcus: Detec    Specimen Source: .Blood Blood-Peripheral    Organism: Blood Culture PCR    Culture Results:   Growth in aerobic and anaerobic bottles: Staphylococcus epidermidis  "Due to technical problems, Proteus sp. will Not be reported as part of  the BCID panel until further notice"  ***Blood Panel PCR results on this specimen are available  approximately 3 hours after the Gram stain result.***  Gram stain, PCR, and/or culture results may not always  correspond due to difference in methodologies.  ************************************************************  This PCR assay was performed using [email or web address].  The following targets are tested for: Enterococcus,  vancomycin resistant enterococci, Listeria monocytogenes,  coagulase negative staphylococci, S. aureus,  methicillin resistant S. aureus, Streptococcus agalactiae  (Group B), S. pneumoniae, S. pyogenes (Group A),  Acinetobacter baumannii, Enterobacter cloacae, E. coli,  Klebsiella oxytoca, K. pneumoniae, Proteus sp.,  Serratia marcescens, Haemophilus influenzae,  Neisseria meningitidis, Pseudomonas aeruginosa, Candida  albicans, C. glabrata, C krusei, C parapsilosis,  C. tropicalis and the KPC resistance gene.    Organism Identification: Blood Culture PCR    Method Type: PCR Patient is seen and examined at the bed side, is afebrile.  She is s/p ligation of Left  AVG pseudoaneurysm , s/p rt fem moshe , Post-op transferred to ICU since became Hypotensive in OR. Currently on pressor.        REVIEW OF SYSTEMS: All other review systems are negative        ALLERGIES: sulfADIAZINE (Angioedema)        ICU Vital Signs Last 24 Hrs  T(C): 36.1 (2020 14:30), Max: 36.8 (2020 00:37)  T(F): 97 (2020 14:30), Max: 98.2 (2020 00:37)  HR: 57 (2020 20:00) (55 - 128)  BP: 124/81 (2020 20:00) (75/59 - 194/90)  BP(mean): 91 (2020 20:00) (60 - 116)  ABP: --  ABP(mean): --  RR: 11 (2020 20:00) (0 - 21)  SpO2: 56% (2020 10:29) (56% - 100%)        PHYSICAL EXAM:  GENERAL: Not in distress   CHEST/LUNG:  Not using accessory muscles  HEART: s1 and s2 present  ABDOMEN:  Nontender and  Nondistended  EXTREMITIES: Left UE ACE bandage in placed, the swelling and tenderness is improving   CNS: Awake and Alert          LABS:                        9.0    14.43 )-----------( 241      ( 2020 14:24 )             27.1                           8.2    9.11  )-----------( 124      ( 2020 06:31 )             24.2             134<L>  |  98  |  17  ----------------------------<  120<H>  3.7   |  29  |  3.84<H>    Ca    8.4      2020 08:16  Phos  2.1       Mg     1.6         TPro  5.6<L>  /  Alb  1.4<L>  /  TBili  0.6  /  DBili  x   /  AST  22  /  ALT  21  /  AlkPhos  216<H>      07-    136  |  98  |  12  ----------------------------<  57<L>  3.4<L>   |  30  |  3.13<H>    Ca    8.2<L>      2020 06:31  Phos  1.9       Mg     1.7         TPro  5.8<L>  /  Alb  1.6<L>  /  TBili  0.5  /  DBili  x   /  AST  26  /  ALT  24  /  AlkPhos  230<H>      PT/INR - ( 2020 16:23 )   PT: 15.1 sec;   INR: 1.31 ratio      PTT - ( 2020 16:23 )  PTT:35.4 sec        CAPILLARY BLOOD GLUCOSE  POCT Blood Glucose.: 114 mg/dL (2020 18:11)  POCT Blood Glucose.: 111 mg/dL (2020 12:15)  POCT Blood Glucose.: 102 mg/dL (2020 10:21)  POCT Blood Glucose.: 77 mg/dL (2020 09:09)  POCT Blood Glucose.: 56 mg/dL (2020 09:07)        Urinalysis Basic - ( 2020 17:02 )  Color: Red / Appearance: bloody / S.015 / pH: x  Gluc: x / Ketone: Negative  / Bili: Small / Urobili: Negative   Blood: x / Protein: 100 / Nitrite: Negative   Leuk Esterase: Moderate / RBC: >50 /HPF / WBC 11-25 /HPF   Sq Epi: x / Non Sq Epi: Few /HPF / Bacteria: Moderate /HPF      Vancomycin Level, Trough (.20 @ 08:16)    Vancomycin Level, Trough: 29.2: Vancomycin trough levels should be rapidly reached and maintained at  15-20 ug/ml for life threatening MRSA  infections such as sepsis, endocarditis, osteomyelitis and pneumonia.         MEDICATIONS  (STANDING):    chlorhexidine 2% Cloths 1 Application(s) Topical <User Schedule>  insulin lispro (HumaLOG) corrective regimen sliding scale   SubCutaneous Before meals and at bedtime  midodrine 10 milliGRAM(s) Oral every 8 hours  norepinephrine Infusion 0.1 MICROgram(s)/kG/Min (3.78 mL/Hr) IV Continuous <Continuous>  pantoprazole    Tablet 40 milliGRAM(s) Oral before breakfast  piperacillin/tazobactam IVPB.. 3.375 Gram(s) IV Intermittent every 12 hours        RADIOLOGY & ADDITIONAL TESTS:    20: US Duplex Hemodialysis Access (20 @ 20:44) >  impression: The left upper extremity AVG (likely brachial artery-graft-cephalic vein) is patent. There is narrowing of the graft lumen due to the presence of thrombus, about 40% narrowing. 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      20: Xray Chest 1 View-PORTABLE IMMEDIATE (20 @ 17:54) The cardiac silhouette is within normal limits. The lungs are clear. No pleural abnormality.          MICROBIOLOGY DATA:    COVID-19  Antibody - for prior infection screening (20 @ 10:08)    COVID-19 IgG Antibody Index: 7.70: Abbott CMIA  Negative Result    <= 1.39 Index  Positive Result      >= 1.40 Index Index    COVID-19 IgG Antibody Interpretation: Positive: This test has not been reviewed by the FDA by the standard review  process. It has been authorized for emergency use by the FDA. [employer] has validated this test to be accurate.  Negative results do not rule out SARS-CoV-2 infection, particularly in  those who have been in recent contact with the virus. Follow-up testing  with a molecular diagnostic test should be considered to rule out  infection in these individuals.  Results from antibody testing should not be used as thesole basis to  diagnose or exclude SARS-CoV-2 infection, or to inform infection status.  Positive results may rarely be due to past or present infection with  non-SARS-CoV-2 coronavirus strains, such as coronavirus HKU1, NL63, OC43,  or 229E. [employer], through extensive validation  testing, has confirmed that this risk is minimal with this test.      Culture - Urine (20 @ 01:34)    Specimen Source: .Urine Clean Catch (Midstream)    Culture Results:   >=3 organisms. Probable collection contamination.      Culture - Blood (20 @ 23:02)    -  Coagulase negative Staphylococcus: Detec    Gram Stain:   Growth in anaerobic bottle: Gram Positive Cocci in Clusters  Growth in aerobic bottle: Gram Positive Cocci in Clusters    Specimen Source: .Blood Blood-Peripheral    Organism: Blood Culture PCR    Culture Results:   Growth in aerobic and anaerobic bottles: Staphylococcus epidermidis  Susceptibility to follow.  "Due to technical problems, Proteus sp. will Not be reported as part of  the BCID panel until further notice"  ***Blood Panel PCR results on this specimen are available  approximately 3 hours after the Gram stain result.***  Gram stain, PCR, and/or culture results may not always  correspond due to difference in methodologies.  ************************************************************  This PCR assay was performed using [employer].  The following targets are tested for: Enterococcus,  vancomycin resistant enterococci, Listeria monocytogenes,  coagulase negative staphylococci, S. aureus,  methicillin resistant S. aureus, Streptococcus agalactiae  (Group B), S. pneumoniae, S. pyogenes (Group A),  Acinetobacter baumannii, Enterobacter cloacae, E. coli,  Klebsiella oxytoca, K. pneumoniae, Proteus sp.,  Serratia marcescens, Haemophilus influenzae,  Neisseria meningitidis, Pseudomonas aeruginosa, Candida  albicans, C. glabrata, C krusei, C parapsilosis,  C. tropicalis and the KPC resistance gene.    Organism Identification: Blood Culture PCR    Method Type: PCR      Culture - Blood (20 @ 23:02)    Gram Stain:   Growth in anaerobic bottle: Gram Positive Cocci in Clusters  Growth in aerobic bottle: Gram Positive Cocci in Clusters    -  Coagulase negative Staphylococcus: Detec    Specimen Source: .Blood Blood-Peripheral    Organism: Blood Culture PCR    Culture Results:   Growth in aerobic and anaerobic bottles: Staphylococcus epidermidis  "Due to technical problems, Proteus sp. will Not be reported as part of  the BCID panel until further notice"  ***Blood Panel PCR results on this specimen are available  approximately 3 hours after the Gram stain result.***  Gram stain, PCR, and/or culture results may not always  correspond due to difference in methodologies.  ************************************************************  This PCR assay was performed using [employer].  The following targets are tested for: Enterococcus,  vancomycin resistant enterococci, Listeria monocytogenes,  coagulase negative staphylococci, S. aureus,  methicillin resistant S. aureus, Streptococcus agalactiae  (Group B), S. pneumoniae, S. pyogenes (Group A),  Acinetobacter baumannii, Enterobacter cloacae, E. coli,  Klebsiella oxytoca, K. pneumoniae, Proteus sp.,  Serratia marcescens, Haemophilus influenzae,  Neisseria meningitidis, Pseudomonas aeruginosa, Candida  albicans, C. glabrata, C krusei, C parapsilosis,  C. tropicalis and the KPC resistance gene.    Organism Identification: Blood Culture PCR    Method Type: PCR

## 2020-07-31 NOTE — DIETITIAN INITIAL EVALUATION ADULT. - OTHER INFO
S/p excision infected graft 7/31. RN reports pt lethargic/ sedated with sore throat, not eating much. Discussed with PGY 1, Nepro BID added. If intake remains poor, may add Nepro TIID.

## 2020-07-31 NOTE — CONSULT NOTE ADULT - SUBJECTIVE AND OBJECTIVE BOX
Patient is a 69y old  Female who presents with a chief complaint of Painful swelling at AV fistula. (2020 21:14)      HPI:  69F from [facility], with PMH of HTN, ESRD, DM, Hyperphosphatemia,  Oral cancer(s/p excision) , comes to the ED  with left arm AV fistula pain and swelling x1 week. Pt was in her usual state of health until last week when she went for her HD session and developed gradual onset of swelling and pain at the AVF site on L arm. As per the patient the HD nurse had inserted the needle incorrectly that caused the swelling and severe pain. AVF however was not occluded and she had her HD sessions as per the schedule  with her last HD session being yesterday.  Reports that she has chest pain in the mid chest that is chronic for years now.   Pt denies any smoking, alcohol or any form of substance abuse.   In the ED,   Pt is AAOX3, moderate distress due to pain   Vitals- 113/ 68, HR- 77, afebrile  wbc- 10.37  Hb 7.1  UA- UTI   EKG- NSR (2020 20:41)    Interval HPI: Patient is 69 year old female with hxof ESRD on HD through Left arm AVG was placed 3-4 years ago, pt was sent from HD center after finishing HD for evaluation of AVG site swelling and pain for a week, blood cultures were sent from ED  and pt was started on broad spectrum antibiotics with vancomycin and pip-hallie, prelim blood culture came back positive for gram positive cocci in cluster, sensitivity is pending. ID and vascular surgery was consulted initially pt was hematoma was stable and pt was planned for OR on  but today Pt's symptoms worsened and pt was having oozing blood from the AVG site and pt found to have pseudoaneurysm and was high risk for AVG rupture so pt was taken for emergent OR on . Pt had AVG removal and ligation in OR today. ICU was consulted for post op monitoring. Pt had 100cc blood loss in OR. last HD was on .       Allergies    sulfADIAZINE (Angioedema)    Intolerances        MEDICATIONS  (STANDING):  metoprolol succinate ER 25 milliGRAM(s) Oral daily  piperacillin/tazobactam IVPB.. 3.375 Gram(s) IV Intermittent every 12 hours  sodium chloride 0.9%. 1000 milliLiter(s) (30 mL/Hr) IV Continuous <Continuous>    MEDICATIONS  (PRN):  fentaNYL    Injectable 25 MICROGram(s) IV Push every 5 minutes PRN Moderate Pain (4 - 6)  glucagon  Injectable 1 milliGRAM(s) IntraMuscular once PRN Glucose <70 milliGRAM(s)/deciLiter  HYDROmorphone  Injectable 0.5 milliGRAM(s) IV Push every 10 minutes PRN Severe Pain (7 - 10)  morphine  - Injectable 2 milliGRAM(s) IV Push every 6 hours PRN Severe Pain (7 - 10)  ondansetron Injectable 4 milliGRAM(s) IV Push once PRN Nausea and/or Vomiting      Daily     Daily     Drug Dosing Weight  Height (cm): 157.48 (2020 14:57)  Weight (kg): 40.3 (2020 01:35)  BMI (kg/m2): 16.3 (2020 01:35)  BSA (m2): 1.35 (2020 01:35)    PAST MEDICAL & SURGICAL HISTORY:  DM (diabetes mellitus)  Vitamin D deficiency  ESRD (end stage renal disease)  Hyperphosphatemia  Hypertension  S/P bunionectomy: bilateral great toes  H/O hemorrhoidectomy  H/O oral cancer  AVF (arteriovenous fistula): x3      FAMILY HISTORY:  Family history of diabetes mellitus in sister (Sibling): and HTN      SOCIAL HISTORY:    ADVANCE DIRECTIVES:    REVIEW OF SYSTEMS:    CONSTITUTIONAL: No fever, weight loss, or fatigue  EYES: No eye pain, visual disturbances, or discharge  ENMT:  No difficulty hearing, tinnitus, vertigo; No sinus or throat pain  NECK: No pain or stiffness  BREASTS: No pain, masses, or nipple discharge  RESPIRATORY: No cough, wheezing, chills or hemoptysis; No shortness of breath  CARDIOVASCULAR: No chest pain, palpitations, dizziness, or leg swelling  GASTROINTESTINAL: No abdominal or epigastric pain. No nausea, vomiting, or hematemesis; No diarrhea or constipation. No melena or hematochezia.  GENITOURINARY: No dysuria, frequency, hematuria, or incontinence  NEUROLOGICAL: No headaches, memory loss, loss of strength, numbness, or tremors  SKIN: No itching, burning, rashes, or lesions   LYMPH NODES: No enlarged glands  ENDOCRINE: No heat or cold intolerance; No hair loss  MUSCULOSKELETAL: No joint pain or swelling; No muscle, back, or extremity pain  PSYCHIATRIC: No depression, anxiety, mood swings, or difficulty sleeping  HEME/LYMPH: No easy bruising, or bleeding gums  ALLERGY AND IMMUNOLOGIC: No hives or eczema          ICU Vital Signs Last 24 Hrs  T(C): 35.8 (2020 01:35), Max: 36.8 (2020 19:55)  T(F): 96.5 (2020 01:35), Max: 98.3 (2020 19:55)  HR: 99 (2020 01:35) (58 - 128)  BP: 99/79 (2020 01:35) (99/79 - 194/99)  BP(mean): 84 (:35) (84 - 97)  ABP: --  ABP(mean): --  RR: 14 (:35) (14 - 20)  SpO2: 100% (2020 01:22) (100% - 100%)          I&O's Detail    2020 07:01  -  2020 02:03  --------------------------------------------------------  IN:    0.9% NaCl: 500 mL  Total IN: 500 mL    OUT:  Total OUT: 0 mL    Total NET: 500 mL          PHYSICAL EXAM:    GENERAL: NAD, well-groomed, well-developed  HEAD:  Atraumatic, Normocephalic  EYES: EOMI, PERRLA, conjunctiva and sclera clear  ENMT: No tonsillar erythema, exudates, or enlargement; Moist mucous membranes, Good dentition, No lesions  NECK: Supple, No JVD, Normal thyroid  NERVOUS SYSTEM:  Alert & Oriented X3, Good concentration; Motor Strength 5/5 B/L upper and lower extremities; DTRs 2+ intact and symmetric  CHEST/LUNG: Clear to percussion bilaterally; No rales, rhonchi, wheezing, or rubs  HEART: Regular rate and rhythm; No murmurs, rubs, or gallops  ABDOMEN: Soft, Nontender, Nondistended; Bowel sounds present  EXTREMITIES:  2+ Peripheral Pulses, No clubbing, cyanosis, or edema  LYMPH: No lymphadenopathy noted  SKIN: No rashes or lesions    LABS:  CBC Full  -  ( 2020 06:31 )  WBC Count : 9.11 K/uL  RBC Count : 2.78 M/uL  Hemoglobin : 8.2 g/dL  Hematocrit : 24.2 %  Platelet Count - Automated : 124 K/uL  Mean Cell Volume : 87.1 fl  Mean Cell Hemoglobin : 29.5 pg  Mean Cell Hemoglobin Concentration : 33.9 gm/dL  Auto Neutrophil # : x  Auto Lymphocyte # : x  Auto Monocyte # : x  Auto Eosinophil # : x  Auto Basophil # : x  Auto Neutrophil % : x  Auto Lymphocyte % : x  Auto Monocyte % : x  Auto Eosinophil % : x  Auto Basophil % : x    07    136  |  98  |  12  ----------------------------<  57<L>  3.4<L>   |  30  |  3.13<H>    Ca    8.2<L>      2020 06:31  Phos  1.9     0730  Mg     1.7     30    TPro  5.8<L>  /  Alb  1.6<L>  /  TBili  0.5  /  DBili  x   /  AST  26  /  ALT  24  /  AlkPhos  230<H>  0729    CAPILLARY BLOOD GLUCOSE      POCT Blood Glucose.: 94 mg/dL (2020 00:43)    PT/INR - ( 2020 16:23 )   PT: 15.1 sec;   INR: 1.31 ratio         PTT - ( 2020 16:23 )  PTT:35.4 sec  Urinalysis Basic - ( 2020 17:02 )    Color: Red / Appearance: bloody / S.015 / pH: x  Gluc: x / Ketone: Negative  / Bili: Small / Urobili: Negative   Blood: x / Protein: 100 / Nitrite: Negative   Leuk Esterase: Moderate / RBC: >50 /HPF / WBC 11-25 /HPF   Sq Epi: x / Non Sq Epi: Few /HPF / Bacteria: Moderate /HPF      CARDIAC MARKERS ( 2020 06:31 )  <0.015 ng/mL / x     / x     / x     / x      CARDIAC MARKERS ( 2020 16:23 )  <0.015 ng/mL / x     / x     / x     / x          Culture Results:   Growth in aerobic bottle: Gram Positive Cocci in Clusters  Growth in anaerobic bottle: Gram Positive Cocci in Clusters  "Due to technical problems, Proteus sp. will Not be reported as part of  the BCID panel until further notice"  ***Blood Panel PCR results on this specimen are available  approximately 3 hours after the Gram stain result.***  Gram stain, PCR, and/or culture results may not always  correspond due to difference in methodologies.  ************************************************************  This PCR assay was performed using [employer].  The following targets are tested for: Enterococcus,  vancomycin resistant enterococci, Listeria monocytogenes,  coagulase negative staphylococci, S. aureus,  methicillin resistant S. aureus, Streptococcus agalactiae  (Group B), S. pneumoniae, S. pyogenes (Group A),  Acinetobacter baumannii, Enterobacter cloacae, E. coli,  Klebsiella oxytoca, K. pneumoniae, Proteus sp.,  Serratia marcescens, Haemophilus influenzae,  Neisseria meningitidis, Pseudomonas aeruginosa, Candida  albicans, C. glabrata, C krusei, C parapsilosis,  C. tropicalis and the KPC resistance gene. ( @ 23:02)  Culture Results:   Growth in aerobic bottle: Gram Positive Cocci in Clusters  Growth in anaerobic bottle: Gram Positive Cocci in Clusters  "Due to technical problems, Proteus sp. will Not be reported as part of  the BCID panel until further notice"  ***Blood Panel PCR results on this specimen are available  approximately 3 hours after the Gram stain result.***  Gram stain, PCR, and/or culture results may not always  correspond due to difference in methodologies.  ************************************************************  This PCR assay was performed using [employer].  The following targets are tested for: Enterococcus,  vancomycin resistant enterococci, Listeria monocytogenes,  coagulase negative staphylococci, S. aureus,  methicillin resistant S. aureus, Streptococcus agalactiae  (Group B), S. pneumoniae, S. pyogenes (Group A),  Acinetobacter baumannii, Enterobacter cloacae, E. coli,  Klebsiella oxytoca, K. pneumoniae, Proteus sp.,  Serratia marcescens, Haemophilus influenzae,  Neisseria meningitidis, Pseudomonas aeruginosa, Candida  albicans, C. glabrata, C krusei, C parapsilosis,  C. tropicalis and the KPC resistance gene. ( @ 23:02)      EKG: normal sinus rhythm, 67 bpm, normal axis, normal pr interval, normal qrs, normal qtc, twi in v1-v3, LVH.     ECHO, US: pending     RADIOLOGY:    EXAM:  XR CHEST PORTABLE IMMED 1V                            PROCEDURE DATE:  2020          INTERPRETATION:  XR CHEST IMMEDIATE    Single AP view    HISTORY:  Sepsis    Comparison: none.      The cardiac silhouette is within normal limits. The lungs are clear. No pleural abnormality.    IMPRESSION: Clear lungs.              VLADIMIR DOWNING M.D., ATTENDING RADIOLOGIST  This document has been electronically signed. 2020  8:42PM                  CRITICAL CARE TIME SPENT: Patient is a 69y old  Female who presents with a chief complaint of Painful swelling at AV fistula. (2020 21:14)      HPI:  69F from [facility], with PMH of HTN, ESRD, DM, Hyperphosphatemia,  Oral cancer(s/p excision) , comes to the ED  with left arm AV fistula pain and swelling x1 week. Pt was in her usual state of health until last week when she went for her HD session and developed gradual onset of swelling and pain at the AVF site on L arm. As per the patient the HD nurse had inserted the needle incorrectly that caused the swelling and severe pain. AVF however was not occluded and she had her HD sessions as per the schedule  with her last HD session being yesterday.  Reports that she has chest pain in the mid chest that is chronic for years now.   Pt denies any smoking, alcohol or any form of substance abuse.   In the ED,   Pt is AAOX3, moderate distress due to pain   Vitals- 113/ 68, HR- 77, afebrile  wbc- 10.37  Hb 7.1  UA- UTI   EKG- NSR (2020 20:41)    Interval HPI: Patient is 69 year old female with hxof ESRD on HD through Left arm AVG was placed 3-4 years ago, pt was sent from HD center after finishing HD for evaluation of AVG site swelling and pain for a week, blood cultures were sent from ED  and pt was started on broad spectrum antibiotics with vancomycin and pip-hallie, prelim blood culture came back positive for gram positive cocci in cluster, sensitivity is pending. ID and vascular surgery was consulted initially pt was hematoma was stable and pt was planned for OR on  but today Pt's symptoms worsened and pt was having oozing blood from the AVG site and pt found to have pseudoaneurysm and was high risk for AVG rupture so pt was taken for emergent OR on . Pt had AVG removal and ligation in OR today. ICU was consulted for post op monitoring. Pt had 100cc blood loss in OR. last HD was on .       Allergies    sulfADIAZINE (Angioedema)    Intolerances        MEDICATIONS  (STANDING):  metoprolol succinate ER 25 milliGRAM(s) Oral daily  piperacillin/tazobactam IVPB.. 3.375 Gram(s) IV Intermittent every 12 hours  sodium chloride 0.9%. 1000 milliLiter(s) (30 mL/Hr) IV Continuous <Continuous>    MEDICATIONS  (PRN):  fentaNYL    Injectable 25 MICROGram(s) IV Push every 5 minutes PRN Moderate Pain (4 - 6)  glucagon  Injectable 1 milliGRAM(s) IntraMuscular once PRN Glucose <70 milliGRAM(s)/deciLiter  HYDROmorphone  Injectable 0.5 milliGRAM(s) IV Push every 10 minutes PRN Severe Pain (7 - 10)  morphine  - Injectable 2 milliGRAM(s) IV Push every 6 hours PRN Severe Pain (7 - 10)  ondansetron Injectable 4 milliGRAM(s) IV Push once PRN Nausea and/or Vomiting      Daily     Daily     Drug Dosing Weight  Height (cm): 157.48 (2020 14:57)  Weight (kg): 40.3 (2020 01:35)  BMI (kg/m2): 16.3 (2020 01:35)  BSA (m2): 1.35 (2020 01:35)    PAST MEDICAL & SURGICAL HISTORY:  DM (diabetes mellitus)  Vitamin D deficiency  ESRD (end stage renal disease)  Hyperphosphatemia  Hypertension  S/P bunionectomy: bilateral great toes  H/O hemorrhoidectomy  H/O oral cancer  AVF (arteriovenous fistula): x3      FAMILY HISTORY:  Family history of diabetes mellitus in sister (Sibling): and HTN      SOCIAL HISTORY:  Pt is from [facility]   Pt denies any smoking, alcohol or any form of substance abuse    ADVANCE DIRECTIVES:    REVIEW OF SYSTEMS:    CONSTITUTIONAL: No fever, weight loss, or fatigue  EYES: No eye pain, visual disturbances, or discharge  ENMT:  No difficulty hearing, tinnitus, vertigo; No sinus or throat pain  NECK: No pain or stiffness  BREASTS: No pain, masses, or nipple discharge  RESPIRATORY: No cough, wheezing, chills or hemoptysis; No shortness of breath  CARDIOVASCULAR: No chest pain, palpitations, dizziness, or leg swelling  GASTROINTESTINAL: No abdominal or epigastric pain. No nausea, vomiting, or hematemesis; No diarrhea or constipation. No melena or hematochezia.  GENITOURINARY: No dysuria, frequency, hematuria, or incontinence  NEUROLOGICAL: No headaches, memory loss, loss of strength, numbness, or tremors  SKIN: No itching, burning, rashes, or lesions   LYMPH NODES: No enlarged glands  ENDOCRINE: No heat or cold intolerance; No hair loss  MUSCULOSKELETAL: No joint pain or swelling; No muscle, back, or extremity pain  PSYCHIATRIC: No depression, anxiety, mood swings, or difficulty sleeping  HEME/LYMPH: No easy bruising, or bleeding gums  ALLERGY AND IMMUNOLOGIC: No hives or eczema          ICU Vital Signs Last 24 Hrs  T(C): 35.8 (2020 01:35), Max: 36.8 (2020 19:55)  T(F): 96.5 (2020 01:35), Max: 98.3 (2020 19:55)  HR: 99 (2020 01:35) (58 - 128)  BP: 99/79 (2020 01:35) (99/79 - 194/99)  BP(mean): 84 (:35) (84 - 97)  ABP: --  ABP(mean): --  RR: 14 (2020 01:35) (14 - 20)  SpO2: 100% (2020 01:22) (100% - 100%)          I&O's Detail    2020 07:01  -  2020 02:03  --------------------------------------------------------  IN:    0.9% NaCl: 500 mL  Total IN: 500 mL    OUT:  Total OUT: 0 mL    Total NET: 500 mL          PHYSICAL EXAM:    GENERAL: NAD, well-groomed, well-developed, cachetic temporal wasting   HEAD:  Atraumatic, Normocephalic  EYES: EOMI, PERRLA, conjunctiva and sclera clear  ENMT: No tonsillar erythema, exudates, or enlargement; Moist mucous membranes, Good dentition, No lesions  NECK: Supple, No JVD, Normal thyroid  NERVOUS SYSTEM:  Alert & Oriented X3, Good concentration; Motor Strength 5/5 B/L upper and lower extremities; DTRs 2+ intact and symmetric  CHEST/LUNG: Clear to percussion bilaterally; No rales, rhonchi, wheezing, or rubs  HEART: Regular rate and rhythm; No murmurs, rubs, or gallops  ABDOMEN: Soft, Nontender, Nondistended; Bowel sounds present  Groin: Right femoral [place]iley   EXTREMITIES:  2+ Peripheral Pulses, No clubbing, cyanosis, or edema, left arm covered with bandage   LYMPH: No lymphadenopathy noted  SKIN: No rashes or lesions    LABS:  CBC Full  -  ( 2020 06:31 )  WBC Count : 9.11 K/uL  RBC Count : 2.78 M/uL  Hemoglobin : 8.2 g/dL  Hematocrit : 24.2 %  Platelet Count - Automated : 124 K/uL  Mean Cell Volume : 87.1 fl  Mean Cell Hemoglobin : 29.5 pg  Mean Cell Hemoglobin Concentration : 33.9 gm/dL  Auto Neutrophil # : x  Auto Lymphocyte # : x  Auto Monocyte # : x  Auto Eosinophil # : x  Auto Basophil # : x  Auto Neutrophil % : x  Auto Lymphocyte % : x  Auto Monocyte % : x  Auto Eosinophil % : x  Auto Basophil % : x    07    136  |  98  |  12  ----------------------------<  57<L>  3.4<L>   |  30  |  3.13<H>    Ca    8.2<L>      2020 06:31  Phos  1.9     07-30  Mg     1.7     0730    TPro  5.8<L>  /  Alb  1.6<L>  /  TBili  0.5  /  DBili  x   /  AST  26  /  ALT  24  /  AlkPhos  230<H>  0729    CAPILLARY BLOOD GLUCOSE      POCT Blood Glucose.: 94 mg/dL (2020 00:43)    PT/INR - ( 2020 16:23 )   PT: 15.1 sec;   INR: 1.31 ratio         PTT - ( 2020 16:23 )  PTT:35.4 sec  Urinalysis Basic - ( 2020 17:02 )    Color: Red / Appearance: bloody / S.015 / pH: x  Gluc: x / Ketone: Negative  / Bili: Small / Urobili: Negative   Blood: x / Protein: 100 / Nitrite: Negative   Leuk Esterase: Moderate / RBC: >50 /HPF / WBC 11-25 /HPF   Sq Epi: x / Non Sq Epi: Few /HPF / Bacteria: Moderate /HPF      CARDIAC MARKERS ( 2020 06:31 )  <0.015 ng/mL / x     / x     / x     / x      CARDIAC MARKERS ( 2020 16:23 )  <0.015 ng/mL / x     / x     / x     / x          Culture Results:   Growth in aerobic bottle: Gram Positive Cocci in Clusters  Growth in anaerobic bottle: Gram Positive Cocci in Clusters  "Due to technical problems, Proteus sp. will Not be reported as part of  the BCID panel until further notice"  ***Blood Panel PCR results on this specimen are available  approximately 3 hours after the Gram stain result.***  Gram stain, PCR, and/or culture results may not always  correspond due to difference in methodologies.  ************************************************************  This PCR assay was performed using [employer].  The following targets are tested for: Enterococcus,  vancomycin resistant enterococci, Listeria monocytogenes,  coagulase negative staphylococci, S. aureus,  methicillin resistant S. aureus, Streptococcus agalactiae  (Group B), S. pneumoniae, S. pyogenes (Group A),  Acinetobacter baumannii, Enterobacter cloacae, E. coli,  Klebsiella oxytoca, K. pneumoniae, Proteus sp.,  Serratia marcescens, Haemophilus influenzae,  Neisseria meningitidis, Pseudomonas aeruginosa, Candida  albicans, C. glabrata, C krusei, C parapsilosis,  C. tropicalis and the KPC resistance gene. ( @ 23:02)  Culture Results:   Growth in aerobic bottle: Gram Positive Cocci in Clusters  Growth in anaerobic bottle: Gram Positive Cocci in Clusters  "Due to technical problems, Proteus sp. will Not be reported as part of  the BCID panel until further notice"  ***Blood Panel PCR results on this specimen are available  approximately 3 hours after the Gram stain result.***  Gram stain, PCR, and/or culture results may not always  correspond due to difference in methodologies.  ************************************************************  This PCR assay was performed using [employer].  The following targets are tested for: Enterococcus,  vancomycin resistant enterococci, Listeria monocytogenes,  coagulase negative staphylococci, S. aureus,  methicillin resistant S. aureus, Streptococcus agalactiae  (Group B), S. pneumoniae, S. pyogenes (Group A),  Acinetobacter baumannii, Enterobacter cloacae, E. coli,  Klebsiella oxytoca, K. pneumoniae, Proteus sp.,  Serratia marcescens, Haemophilus influenzae,  Neisseria meningitidis, Pseudomonas aeruginosa, Candida  albicans, C. glabrata, C krusei, C parapsilosis,  C. tropicalis and the KPC resistance gene. ( @ 23:02)      EKG: normal sinus rhythm, 67 bpm, normal axis, normal pr interval, normal qrs, normal qtc, twi in v1-v3, LVH.     ECHO, US: pending     RADIOLOGY:    EXAM:  XR CHEST PORTABLE IMMED 1V                            PROCEDURE DATE:  2020          INTERPRETATION:  XR CHEST IMMEDIATE    Single AP view    HISTORY:  Sepsis    Comparison: none.      The cardiac silhouette is within normal limits. The lungs are clear. No pleural abnormality.    IMPRESSION: Clear lungs.              VLADIMIR DOWNING M.D., ATTENDING RADIOLOGIST  This document has been electronically signed. 2020  8:42PM                  CRITICAL CARE TIME SPENT:

## 2020-07-31 NOTE — CONSULT NOTE ADULT - ASSESSMENT
ESRD post ligation and surgery of left AVF.  Gram positive clusters , coagulase negative in blood AND WAS PLACED ON Vancomycin and Zosyn  Anemia  Hypophosphatemia  Hypoalbuminemia  Malnutrition, oral cancer.  Hypotension earlier.    Due to hypotension , will follow with dialysis tomorrow, there is no emergency factor to dialyze patient today  Her Electrolytes' are stable and in no CHF.  Continue Vasopressors as need  Supplement phosphate.  Follow PTH , Vitamin D level.    Dialysis tomorrow.

## 2020-07-31 NOTE — DIETITIAN INITIAL EVALUATION ADULT. - PROBLEM SELECTOR PLAN 2
Pt has Known PMH of ESRD T/TH/S  Pt had last HD yesterday 07/28   Holding HD for 2 days as per Vasc recommendations  In case on any urgent HD need, consider moshe grider/ifrah [facility]away am

## 2020-07-31 NOTE — BRIEF OPERATIVE NOTE - OPERATION/FINDINGS
pseudoaneurysm of AV graft, ruptured  infected with purulence  graft entirely removed, veins suture ligated  palpable radial pulse  hemostasis with fibrin powder

## 2020-07-31 NOTE — PROGRESS NOTE ADULT - SUBJECTIVE AND OBJECTIVE BOX
Patient is a 69y old  Female who presents with a chief complaint of Painful swelling at AV fistula. (31 Jul 2020 02:02)    pt seen in ed [ x ], reg med floor [   ], bed [ x ], chair at bedside [   ], a+o x3 [ x ], lethargic [  ],    nad [ x ]      Allergies    sulfADIAZINE (Angioedema)        Vitals    T(F): 97.2 (07-31-20 @ 04:00), Max: 98.3 (07-30-20 @ 19:55)  HR: 74 (07-31-20 @ 06:00) (58 - 128)  BP: 97/78 (07-31-20 @ 06:00) (95/79 - 194/99)  RR: 14 (07-31-20 @ 06:00) (13 - 20)  SpO2: 100% (07-31-20 @ 05:00) (100% - 100%)  Wt(kg): --  CAPILLARY BLOOD GLUCOSE      POCT Blood Glucose.: 103 mg/dL (31 Jul 2020 05:04)      Labs                          9.6    13.83 )-----------( 180      ( 31 Jul 2020 02:45 )             28.0       07-31    134<L>  |  96  |  15  ----------------------------<  98  3.4<L>   |  30  |  3.74<H>    Ca    8.3<L>      31 Jul 2020 02:45  Phos  2.1     07-31  Mg     1.6     07-31    TPro  5.6<L>  /  Alb  1.4<L>  /  TBili  0.6  /  DBili  x   /  AST  22  /  ALT  21  /  AlkPhos  216<H>  07-31      CARDIAC MARKERS ( 31 Jul 2020 02:45 )  <0.015 ng/mL / x     / 11 U/L / x     / <1.0 ng/mL  CARDIAC MARKERS ( 30 Jul 2020 06:31 )  <0.015 ng/mL / x     / x     / x     / x      CARDIAC MARKERS ( 29 Jul 2020 16:23 )  <0.015 ng/mL / x     / x     / x     / x            .Blood Blood-Peripheral  07-29 @ 23:02   Growth in aerobic bottle: Gram Positive Cocci in Clusters  Growth in anaerobic bottle: Gram Positive Cocci in Clusters  "Due to technical problems, Proteus sp. will Not be reported as part of  the BCID panel until further notice"  ***Blood Panel PCR results on this specimen are available  approximately 3 hours after the Gram stain result.***  Gram stain, PCR, and/or culture results may not always  correspond due to difference in methodologies.  ************************************************************  This PCR assay was performed using [employer].  The following targets are tested for: Enterococcus,  vancomycin resistant enterococci, Listeria monocytogenes,  coagulase negative staphylococci, S. aureus,  methicillin resistant S. aureus, Streptococcus agalactiae  (Group B), S. pneumoniae, S. pyogenes (Group A),  Acinetobacter baumannii, Enterobacter cloacae, E. coli,  Klebsiella oxytoca, K. pneumoniae, Proteus sp.,  Serratia marcescens, Haemophilus influenzae,  Neisseria meningitidis, Pseudomonas aeruginosa, Candida  albicans, C. glabrata, C krusei, C parapsilosis,  C. tropicalis and the KPC resistance gene.  --  Blood Culture PCR          Radiology Results      Meds    MEDICATIONS  (STANDING):  insulin lispro (HumaLOG) corrective regimen sliding scale   SubCutaneous Before meals and at bedtime  metoprolol succinate ER 25 milliGRAM(s) Oral daily  pantoprazole    Tablet 40 milliGRAM(s) Oral before breakfast  piperacillin/tazobactam IVPB.. 3.375 Gram(s) IV Intermittent every 12 hours      MEDICATIONS  (PRN):  glucagon  Injectable 1 milliGRAM(s) IntraMuscular once PRN Glucose <70 milliGRAM(s)/deciLiter  morphine  - Injectable 2 milliGRAM(s) IV Push every 6 hours PRN Severe Pain (7 - 10)      Physical Exam    Neuro :  no focal deficits  Respiratory: CTA B/L  CV: RRR, S1S2, no murmurs,   Abdominal: Soft, NT, ND +BS,  Extremities: No edema, + peripheral pulses, left ue avf tender to touch,      ASSESSMENT      L AV interposition graft, possible infiltration vs underlying infection  Cellulitis of left upper extremity    uti  h/o ESRD on hd,   DM (diabetes mellitus)  Vitamin D deficiency  Hyperphosphatemia  Hypertension  S/P bunionectomy  hemorrhoidectomy  oral cancer  left AVF (arteriovenous fistula)      PLAN      cont zosyn,   cont vant vanco with hd  id cons  f/u blood cx and ucx  vasc surg f/u  rec Hold HD for 2 days, if possible. If pt develops fluid overload and HD sooner, will need shiley catheter placement.  f/u doppler fistulogram   cont pain control  renal cons  cont hd as per renal   f/u hgba1c  hss  cont current meds Patient is a 69y old  Female who presents with a chief complaint of Painful swelling at AV fistula. (31 Jul 2020 02:02)    pt seen in icu [ x ], reg med floor [   ], bed [ x ], chair at bedside [   ], a+o x3 [ x ], lethargic [  ],    nad [ x ]    pt admitted to icu s/p AVG excision and ligation for post op monitoring.         Allergies    sulfADIAZINE (Angioedema)        Vitals    T(F): 97.2 (07-31-20 @ 04:00), Max: 98.3 (07-30-20 @ 19:55)  HR: 74 (07-31-20 @ 06:00) (58 - 128)  BP: 97/78 (07-31-20 @ 06:00) (95/79 - 194/99)  RR: 14 (07-31-20 @ 06:00) (13 - 20)  SpO2: 100% (07-31-20 @ 05:00) (100% - 100%)  Wt(kg): --  CAPILLARY BLOOD GLUCOSE      POCT Blood Glucose.: 103 mg/dL (31 Jul 2020 05:04)      Labs                          9.6    13.83 )-----------( 180      ( 31 Jul 2020 02:45 )             28.0       07-31    134<L>  |  96  |  15  ----------------------------<  98  3.4<L>   |  30  |  3.74<H>    Ca    8.3<L>      31 Jul 2020 02:45  Phos  2.1     07-31  Mg     1.6     07-31    TPro  5.6<L>  /  Alb  1.4<L>  /  TBili  0.6  /  DBili  x   /  AST  22  /  ALT  21  /  AlkPhos  216<H>  07-31    A1C with Estimated Average Glucose Result: 5.4: Method: Immunoassay       Reference Range                4.0-5.6%       High risk (prediabetic)        5.7-6.4%       Diabetic, diagnostic             >=6.5%       ADA diabetic treatment goal       <7.0%  The Hemoglobin A1c testing is NGSP-certified.Reference ranges are based  upon the 2010 recommendations of  the American Diabetes Association.  Interpretation may vary for children  and adolescents. % (07.30.20 @ 10:23)          CARDIAC MARKERS ( 31 Jul 2020 02:45 )  <0.015 ng/mL / x     / 11 U/L / x     / <1.0 ng/mL  CARDIAC MARKERS ( 30 Jul 2020 06:31 )  <0.015 ng/mL / x     / x     / x     / x      CARDIAC MARKERS ( 29 Jul 2020 16:23 )  <0.015 ng/mL / x     / x     / x     / x            .Blood Blood-Peripheral  07-29 @ 23:02   Growth in aerobic bottle: Gram Positive Cocci in Clusters  Growth in anaerobic bottle: Gram Positive Cocci in Clusters  "Due to technical problems, Proteus sp. will Not be reported as part of  the BCID panel until further notice"  ***Blood Panel PCR results on this specimen are available  approximately 3 hours after the Gram stain result.***  Gram stain, PCR, and/or culture results may not always  correspond due to difference in methodologies.  ************************************************************  This PCR assay was performed using [employer].  The following targets are tested for: Enterococcus,  vancomycin resistant enterococci, Listeria monocytogenes,  coagulase negative staphylococci, S. aureus,  methicillin resistant S. aureus, Streptococcus agalactiae  (Group B), S. pneumoniae, S. pyogenes (Group A),  Acinetobacter baumannii, Enterobacter cloacae, E. coli,  Klebsiella oxytoca, K. pneumoniae, Proteus sp.,  Serratia marcescens, Haemophilus influenzae,  Neisseria meningitidis, Pseudomonas aeruginosa, Candida  albicans, C. glabrata, C krusei, C parapsilosis,  C. tropicalis and the KPC resistance gene.  --  Blood Culture PCR  -  Coagulase negative Staphylococcus: Detec (07.29.20 @ 23:02)            Radiology Results      Meds    MEDICATIONS  (STANDING):  insulin lispro (HumaLOG) corrective regimen sliding scale   SubCutaneous Before meals and at bedtime  metoprolol succinate ER 25 milliGRAM(s) Oral daily  pantoprazole    Tablet 40 milliGRAM(s) Oral before breakfast  piperacillin/tazobactam IVPB.. 3.375 Gram(s) IV Intermittent every 12 hours      MEDICATIONS  (PRN):  glucagon  Injectable 1 milliGRAM(s) IntraMuscular once PRN Glucose <70 milliGRAM(s)/deciLiter  morphine  - Injectable 2 milliGRAM(s) IV Push every 6 hours PRN Severe Pain (7 - 10)      Physical Exam    Neuro :  no focal deficits  Respiratory: CTA B/L  CV: RRR, S1S2, no murmurs,   Abdominal: Soft, NT, ND +BS,  Extremities: No edema, + peripheral pulses, left ue dressing cdi, distal forearm and hand edema,      ASSESSMENT      Infected prosthetic vascular graft LUE  Pseudoaneurysm of LUE arteriovenous graft  Cellulitis of left upper extremity    uti  h/o ESRD on hd,   DM (diabetes mellitus)  Vitamin D deficiency  Hyperphosphatemia  Hypertension  S/P bunionectomy  hemorrhoidectomy  oral cancer  left AVF (arteriovenous fistula)      PLAN      s/p Excision, infected graft, extremity and Ligation of arteriovenous fistula or access graft of upper extremity 7/30/20  vasc surg f/u    cont pain control  cont wound care  gi/dvt prophylaxis  cont zosyn,   cont  vanco with hd  id f/u   Vancomycin level in AM and re-dose if level less than 20  blood cx with cns noted above  rept blood cx   s/p right femoral shiley placement  renal cons  cont hd as per renal   hgba1c 5.4  hss  cont current meds

## 2020-07-31 NOTE — PROGRESS NOTE ADULT - SUBJECTIVE AND OBJECTIVE BOX
INTERVAL HPI/OVERNIGHT EVENTS:    Pt seen and examined at bedside. Admits to incisional pain and swelling to lt UE; offers no other complaints;       Vital Signs Last 24 Hrs  T(C): 35.6 (31 Jul 2020 08:00), Max: 36.8 (30 Jul 2020 19:55)  T(F): 96 (31 Jul 2020 08:00), Max: 98.3 (30 Jul 2020 19:55)  HR: 74 (31 Jul 2020 08:00) (62 - 128)  BP: 90/70 (31 Jul 2020 08:00) (90/70 - 194/99)  BP(mean): 74 (31 Jul 2020 08:00) (74 - 97)  RR: 15 (31 Jul 2020 08:00) (11 - 20)  SpO2: 100% (31 Jul 2020 05:00) (100% - 100%)  I&O's Detail    30 Jul 2020 07:01  -  31 Jul 2020 07:00  --------------------------------------------------------  IN:    0.9% NaCl: 500 mL    IV PiggyBack: 100 mL  Total IN: 600 mL    OUT:  Total OUT: 0 mL    Total NET: 600 mL      pantoprazole    Tablet 40 milliGRAM(s) Oral before breakfast  piperacillin/tazobactam IVPB.. 3.375 Gram(s) IV Intermittent every 12 hours      Physical Exam  General: AAOx3, No acute distress  Pulm: Respirations non labored   Extremities: Lt UE, upper arm, wounds w/ no active bleeding, dressing applied, distal forearm and hand pitting edema, hand cool to touch; Rt groin fem shiley in place, dressing d/c/i;  Vasc: Dopplerable Radial and Ulnar pulse lt UE       Labs:                        9.0    13.22 )-----------( 188      ( 31 Jul 2020 08:16 )             26.8     07-31    134<L>  |  98  |  17  ----------------------------<  120<H>  3.7   |  29  |  3.84<H>    Ca    8.4      31 Jul 2020 08:16  Phos  2.1     07-31  Mg     1.6     07-31    TPro  5.6<L>  /  Alb  1.4<L>  /  TBili  0.6  /  DBili  x   /  AST  22  /  ALT  21  /  AlkPhos  216<H>  07-31    PT/INR - ( 31 Jul 2020 02:45 )   PT: 16.7 sec;   INR: 1.45 ratio         PTT - ( 29 Jul 2020 16:23 )  PTT:35.4 sec

## 2020-07-31 NOTE — DIETITIAN INITIAL EVALUATION ADULT. - PERTINENT LABORATORY DATA
07-31 Na134 mmol/L<L> Glu 120 mg/dL<H> K+ 3.7 mmol/L Cr  3.84 mg/dL<H> BUN 17 mg/dL 07-31 Phos 2.1 mg/dL<L> 07-31 Alb 1.4 g/dL<L> 07-30 Chol <50 mg/dL LDL <24 mg/dL HDL 11 mg/dL<L> Trig 77 mg/dL

## 2020-07-31 NOTE — CONSULT NOTE ADULT - SUBJECTIVE AND OBJECTIVE BOX
Patient is a 69y old  Female who presents with a chief complaint of Painful swelling at AV fistula. (31 Jul 2020 09:57)      HPI:  69F from [facility], with PMH of HTN, ESRD, DM, Hyperphosphatemia,  Oral cancer(s/p excision) , comes to the ED  with left arm AV fistula pain and swelling x1 week. Pt was in her usual state of health until last week when she went for her HD session and developed gradual onset of swelling and pain at the AVF site on L arm. As per the patient the HD nurse had inserted the needle incorrectly that caused the swelling and severe pain. AVF however was not occluded and she had her HD sessions as per the schedule T/Th/Sa with her last HD session being yesterday.  Reports that she has chest pain in the mid chest that is chronic for years now.   Pt denies any smoking, alcohol or any form of substance abuse.   In the ED,   Pt is AAOX3, moderate distress due to pain   Vitals- 113/ 68, HR- 77, afebrile  wbc- 10.37  Hb 7.1  UA- UTI   EKG- NSR (29 Jul 2020 20:41)      PAST MEDICAL & SURGICAL HISTORY:  DM (diabetes mellitus)  Vitamin D deficiency  ESRD (end stage renal disease)  Hyperphosphatemia  Hypertension  S/P bunionectomy: bilateral great toes  H/O hemorrhoidectomy  H/O oral cancer  AVF (arteriovenous fistula): x3         MEDICATIONS  (STANDING):  chlorhexidine 2% Cloths 1 Application(s) Topical <User Schedule>  insulin lispro (HumaLOG) corrective regimen sliding scale   SubCutaneous Before meals and at bedtime  midodrine 10 milliGRAM(s) Oral every 8 hours  norepinephrine Infusion 0.1 MICROgram(s)/kG/Min (3.78 mL/Hr) IV Continuous <Continuous>  pantoprazole    Tablet 40 milliGRAM(s) Oral before breakfast  piperacillin/tazobactam IVPB.. 3.375 Gram(s) IV Intermittent every 12 hours    MEDICATIONS  (PRN):  glucagon  Injectable 1 milliGRAM(s) IntraMuscular once PRN Glucose <70 milliGRAM(s)/deciLiter  HYDROmorphone  Injectable 1 milliGRAM(s) IV Push every 3 hours PRN Severe Pain (7 - 10)      FAMILY HISTORY:  Family history of diabetes mellitus in sister (Sibling): and HTN      SOCIAL HISTORY:      REVIEW OF SYSTEMS:  CONSTITUTIONAL: No fever, weight loss, or fatigue  EYES: No eye pain, visual disturbances, or discharge  ENT:  No difficulty hearing, tinnitus, vertigo; No sinus or throat pain  NECK: No pain or stiffness  RESPIRATORY: No cough, wheezing, chills or hemoptysis; No Shortness of Breath  CARDIOVASCULAR: No chest pain, palpitations, passing out, dizziness, or leg swelling  GASTROINTESTINAL: No abdominal or epigastric pain. No nausea, vomiting, or hematemesis; No diarrhea or constipation. No melena or hematochezia.  GENITOURINARY: No dysuria, frequency, hematuria, or incontinence  NEUROLOGICAL: No headaches, memory loss, loss of strength, numbness, or tremors  SKIN: No itching, burning, rashes, or lesions   LYMPH Nodes: No enlarged glands  ENDOCRINE: No heat or cold intolerance; No hair loss  MUSCULOSKELETAL: No joint pain or swelling; No muscle, back, or extremity pain  PSYCHIATRIC: No depression, anxiety, mood swings, or difficulty sleeping  HEME/LYMPH: No easy bruising, or bleeding gums  ALLERGY AND IMMUNOLOGIC: No hives or eczema	        Vital Signs Last 24 Hrs  T(C): 36.1 (31 Jul 2020 14:30), Max: 36.8 (30 Jul 2020 19:55)  T(F): 97 (31 Jul 2020 14:30), Max: 98.3 (30 Jul 2020 19:55)  HR: 57 (31 Jul 2020 18:30) (55 - 128)  BP: 106/71 (31 Jul 2020 18:15) (75/59 - 194/90)  BP(mean): 80 (31 Jul 2020 18:15) (60 - 116)  RR: 11 (31 Jul 2020 18:30) (0 - 21)  SpO2: 56% (31 Jul 2020 10:29) (56% - 100%)      Constitutional:    NC/AT:    HEENT:    Neck:  No JVD, bruits or thyromegaly    Respiratory:  Clear without rales or rhonchi    Cardiovascular:  RR without murmur, rub or gallop.    Gastrointestinal: Soft without hepatosplenomegaly.    Extremities: without cyanosis, clubbing or edema.    Neurological:  Oriented   x      . No gross sensory or motor defects.        LABS:                        9.0    14.43 )-----------( 241      ( 31 Jul 2020 14:24 )             27.1     07-31    134<L>  |  98  |  17  ----------------------------<  120<H>  3.7   |  29  |  3.84<H>    Ca    8.4      31 Jul 2020 08:16  Phos  2.1     07-31  Mg     1.6     07-31    TPro  5.6<L>  /  Alb  1.4<L>  /  TBili  0.6  /  DBili  x   /  AST  22  /  ALT  21  /  AlkPhos  216<H>  07-31    CARDIAC MARKERS ( 31 Jul 2020 02:45 )  <0.015 ng/mL / x     / 11 U/L / x     / <1.0 ng/mL  CARDIAC MARKERS ( 30 Jul 2020 06:31 )  <0.015 ng/mL / x     / x     / x     / x          PT/INR - ( 31 Jul 2020 02:45 )   PT: 16.7 sec;   INR: 1.45 ratio             CAPILLARY BLOOD GLUCOSE      POCT Blood Glucose.: 93 mg/dL (31 Jul 2020 16:35)  POCT Blood Glucose.: 104 mg/dL (31 Jul 2020 11:12)  POCT Blood Glucose.: 103 mg/dL (31 Jul 2020 05:04)  POCT Blood Glucose.: 94 mg/dL (31 Jul 2020 00:43)      RADIOLOGY & ADDITIONAL STUDIES: Patient is a 69y old  Female who presents with a chief complaint of Painful swelling at AV fistula. (31 Jul 2020 09:57)      HPI:  69F from [facility], with PMH of HTN, ESRD, DM, Hyperphosphatemia,  Oral cancer(s/p excision) , comes to the ED  with left arm AV fistula pain and swelling x1 week. Pt was in her usual state of health until last week when she went for her HD session and developed gradual onset of swelling and pain at the AVF site on L arm. As per the patient the HD nurse had inserted the needle incorrectly that caused the swelling and severe pain. AVF however was not occluded and she had her HD sessions as per the schedule T/Th/Sa with her last HD session being yesterday.  Reports that she has chest pain in the mid chest that is chronic for years now.   Pt denies any smoking, alcohol or any form of substance abuse.   In the ED,   Pt is AAOX3, moderate distress due to pain   Vitals- 113/ 68, HR- 77, afebrile  wbc- 10.37  Hb 7.1  UA- UTI   EKG- NSR  endocrinology consulted for glycemic management  patient reports h/o DM  on sliding scale humalog prior to admit  in ICU for monitoring for s/p AVG aneurysm ligation      PAST MEDICAL & SURGICAL HISTORY:  DM (diabetes mellitus)  Vitamin D deficiency  ESRD (end stage renal disease)  Hyperphosphatemia  Hypertension  S/P bunionectomy: bilateral great toes  H/O hemorrhoidectomy  H/O oral cancer  AVF (arteriovenous fistula): x3         MEDICATIONS  (STANDING):  chlorhexidine 2% Cloths 1 Application(s) Topical <User Schedule>  insulin lispro (HumaLOG) corrective regimen sliding scale   SubCutaneous Before meals and at bedtime  midodrine 10 milliGRAM(s) Oral every 8 hours  norepinephrine Infusion 0.1 MICROgram(s)/kG/Min (3.78 mL/Hr) IV Continuous <Continuous>  pantoprazole    Tablet 40 milliGRAM(s) Oral before breakfast  piperacillin/tazobactam IVPB.. 3.375 Gram(s) IV Intermittent every 12 hours    MEDICATIONS  (PRN):  glucagon  Injectable 1 milliGRAM(s) IntraMuscular once PRN Glucose <70 milliGRAM(s)/deciLiter  HYDROmorphone  Injectable 1 milliGRAM(s) IV Push every 3 hours PRN Severe Pain (7 - 10)      FAMILY HISTORY:  Family history of diabetes mellitus in sister (Sibling): and HTN      SOCIAL HISTORY:      REVIEW OF SYSTEMS:  CONSTITUTIONAL:  fatigue  EYES: No eye pain, visual disturbances, or discharge  ENT:  No difficulty hearing, tinnitus, vertigo;   NECK: No pain or stiffness  RESPIRATORY: No cough, wheezing, chills or hemoptysis;   CARDIOVASCULAR: No chest pain  GASTROINTESTINAL: No abdominal or epigastric pain. No nausea, vomiting, or hematemesis;   GENITOURINARY: No dysuria, frequency, hematuria, or incontinence  NEUROLOGICAL: No headaches, memory loss, loss of strength, numbness, or tremors  SKIN: No itching, burning, rashes, or lesions   LYMPH Nodes: No enlarged glands  ENDOCRINE: No heat or cold intolerance; No hair loss  MUSCULOSKELETAL: AV fistula swelling and pain  PSYCHIATRIC: No depression, anxiety, mood swings, or difficulty sleeping  HEME/LYMPH: No easy bruising, or bleeding gums  ALLERGY AND IMMUNOLOGIC: No hives or eczema	        Vital Signs Last 24 Hrs  T(C): 36.1 (31 Jul 2020 14:30), Max: 36.8 (30 Jul 2020 19:55)  T(F): 97 (31 Jul 2020 14:30), Max: 98.3 (30 Jul 2020 19:55)  HR: 57 (31 Jul 2020 18:30) (55 - 128)  BP: 106/71 (31 Jul 2020 18:15) (75/59 - 194/90)  BP(mean): 80 (31 Jul 2020 18:15) (60 - 116)  RR: 11 (31 Jul 2020 18:30) (0 - 21)  SpO2: 56% (31 Jul 2020 10:29) (56% - 100%)      Constitutional:    NC/AT:    HEENT:    Neck:  No JVD  Respiratory:  reduced breath sounds b/l bases    Cardiovascular:  RR   Gastrointestinal: Soft     Extremities: without cyanosis      Neurological:  non focal        LABS:                        9.0    14.43 )-----------( 241      ( 31 Jul 2020 14:24 )             27.1     07-31    134<L>  |  98  |  17  ----------------------------<  120<H>  3.7   |  29  |  3.84<H>    Ca    8.4      31 Jul 2020 08:16  Phos  2.1     07-31  Mg     1.6     07-31    TPro  5.6<L>  /  Alb  1.4<L>  /  TBili  0.6  /  DBili  x   /  AST  22  /  ALT  21  /  AlkPhos  216<H>  07-31    CARDIAC MARKERS ( 31 Jul 2020 02:45 )  <0.015 ng/mL / x     / 11 U/L / x     / <1.0 ng/mL  CARDIAC MARKERS ( 30 Jul 2020 06:31 )  <0.015 ng/mL / x     / x     / x     / x          PT/INR - ( 31 Jul 2020 02:45 )   PT: 16.7 sec;   INR: 1.45 ratio             CAPILLARY BLOOD GLUCOSE      POCT Blood Glucose.: 93 mg/dL (31 Jul 2020 16:35)  POCT Blood Glucose.: 104 mg/dL (31 Jul 2020 11:12)  POCT Blood Glucose.: 103 mg/dL (31 Jul 2020 05:04)  POCT Blood Glucose.: 94 mg/dL (31 Jul 2020 00:43)      RADIOLOGY & ADDITIONAL STUDIES:

## 2020-08-01 LAB
-  AMPICILLIN/SULBACTAM: SIGNIFICANT CHANGE UP
-  CEFAZOLIN: SIGNIFICANT CHANGE UP
-  CLINDAMYCIN: SIGNIFICANT CHANGE UP
-  ERYTHROMYCIN: SIGNIFICANT CHANGE UP
-  GENTAMICIN: SIGNIFICANT CHANGE UP
-  OXACILLIN: SIGNIFICANT CHANGE UP
-  PENICILLIN: SIGNIFICANT CHANGE UP
-  RIFAMPIN: SIGNIFICANT CHANGE UP
-  TETRACYCLINE: SIGNIFICANT CHANGE UP
-  TRIMETHOPRIM/SULFAMETHOXAZOLE: SIGNIFICANT CHANGE UP
-  VANCOMYCIN: SIGNIFICANT CHANGE UP
ALBUMIN SERPL ELPH-MCNC: 1.4 G/DL — LOW (ref 3.5–5)
ALP SERPL-CCNC: 209 U/L — HIGH (ref 40–120)
ALT FLD-CCNC: 17 U/L DA — SIGNIFICANT CHANGE UP (ref 10–60)
ANION GAP SERPL CALC-SCNC: 9 MMOL/L — SIGNIFICANT CHANGE UP (ref 5–17)
AST SERPL-CCNC: 14 U/L — SIGNIFICANT CHANGE UP (ref 10–40)
BASOPHILS # BLD AUTO: 0.08 K/UL — SIGNIFICANT CHANGE UP (ref 0–0.2)
BASOPHILS NFR BLD AUTO: 0.5 % — SIGNIFICANT CHANGE UP (ref 0–2)
BILIRUB SERPL-MCNC: 0.5 MG/DL — SIGNIFICANT CHANGE UP (ref 0.2–1.2)
BUN SERPL-MCNC: 21 MG/DL — HIGH (ref 7–18)
CALCIUM SERPL-MCNC: 8.9 MG/DL — SIGNIFICANT CHANGE UP (ref 8.4–10.5)
CHLORIDE SERPL-SCNC: 95 MMOL/L — LOW (ref 96–108)
CO2 SERPL-SCNC: 29 MMOL/L — SIGNIFICANT CHANGE UP (ref 22–31)
CREAT SERPL-MCNC: 4.56 MG/DL — HIGH (ref 0.5–1.3)
CULTURE RESULTS: SIGNIFICANT CHANGE UP
EOSINOPHIL # BLD AUTO: 0.1 K/UL — SIGNIFICANT CHANGE UP (ref 0–0.5)
EOSINOPHIL NFR BLD AUTO: 0.7 % — SIGNIFICANT CHANGE UP (ref 0–6)
GLUCOSE BLDC GLUCOMTR-MCNC: 76 MG/DL — SIGNIFICANT CHANGE UP (ref 70–99)
GLUCOSE BLDC GLUCOMTR-MCNC: 87 MG/DL — SIGNIFICANT CHANGE UP (ref 70–99)
GLUCOSE BLDC GLUCOMTR-MCNC: 98 MG/DL — SIGNIFICANT CHANGE UP (ref 70–99)
GLUCOSE SERPL-MCNC: 86 MG/DL — SIGNIFICANT CHANGE UP (ref 70–99)
GRAM STN FLD: SIGNIFICANT CHANGE UP
GRAM STN FLD: SIGNIFICANT CHANGE UP
HCT VFR BLD CALC: 25.8 % — LOW (ref 34.5–45)
HGB BLD-MCNC: 8.5 G/DL — LOW (ref 11.5–15.5)
IMM GRANULOCYTES NFR BLD AUTO: 0.5 % — SIGNIFICANT CHANGE UP (ref 0–1.5)
LACTATE SERPL-SCNC: 1.5 MMOL/L — SIGNIFICANT CHANGE UP (ref 0.7–2)
LYMPHOCYTES # BLD AUTO: 1.99 K/UL — SIGNIFICANT CHANGE UP (ref 1–3.3)
LYMPHOCYTES # BLD AUTO: 13.6 % — SIGNIFICANT CHANGE UP (ref 13–44)
MAGNESIUM SERPL-MCNC: 1.8 MG/DL — SIGNIFICANT CHANGE UP (ref 1.6–2.6)
MCHC RBC-ENTMCNC: 29.7 PG — SIGNIFICANT CHANGE UP (ref 27–34)
MCHC RBC-ENTMCNC: 32.9 GM/DL — SIGNIFICANT CHANGE UP (ref 32–36)
MCV RBC AUTO: 90.2 FL — SIGNIFICANT CHANGE UP (ref 80–100)
METHOD TYPE: SIGNIFICANT CHANGE UP
MONOCYTES # BLD AUTO: 1.29 K/UL — HIGH (ref 0–0.9)
MONOCYTES NFR BLD AUTO: 8.8 % — SIGNIFICANT CHANGE UP (ref 2–14)
NEUTROPHILS # BLD AUTO: 11.07 K/UL — HIGH (ref 1.8–7.4)
NEUTROPHILS NFR BLD AUTO: 75.9 % — SIGNIFICANT CHANGE UP (ref 43–77)
NRBC # BLD: 0 /100 WBCS — SIGNIFICANT CHANGE UP (ref 0–0)
ORGANISM # SPEC MICROSCOPIC CNT: SIGNIFICANT CHANGE UP
PHOSPHATE SERPL-MCNC: 3.8 MG/DL — SIGNIFICANT CHANGE UP (ref 2.5–4.5)
PLATELET # BLD AUTO: 291 K/UL — SIGNIFICANT CHANGE UP (ref 150–400)
POTASSIUM SERPL-MCNC: 4 MMOL/L — SIGNIFICANT CHANGE UP (ref 3.5–5.3)
POTASSIUM SERPL-SCNC: 4 MMOL/L — SIGNIFICANT CHANGE UP (ref 3.5–5.3)
PROT SERPL-MCNC: 5.5 G/DL — LOW (ref 6–8.3)
RBC # BLD: 2.86 M/UL — LOW (ref 3.8–5.2)
RBC # FLD: 19 % — HIGH (ref 10.3–14.5)
SODIUM SERPL-SCNC: 133 MMOL/L — LOW (ref 135–145)
SPECIMEN SOURCE: SIGNIFICANT CHANGE UP
VANCOMYCIN TROUGH SERPL-MCNC: 18.2 UG/ML — SIGNIFICANT CHANGE UP (ref 10–20)
WBC # BLD: 14.6 K/UL — HIGH (ref 3.8–10.5)
WBC # FLD AUTO: 14.6 K/UL — HIGH (ref 3.8–10.5)

## 2020-08-01 RX ORDER — PHENYLEPHRINE HYDROCHLORIDE 10 MG/ML
0.1 INJECTION INTRAVENOUS
Qty: 40 | Refills: 0 | Status: DISCONTINUED | OUTPATIENT
Start: 2020-08-01 | End: 2020-08-01

## 2020-08-01 RX ORDER — VANCOMYCIN HCL 1 G
1000 VIAL (EA) INTRAVENOUS ONCE
Refills: 0 | Status: COMPLETED | OUTPATIENT
Start: 2020-08-01 | End: 2020-08-01

## 2020-08-01 RX ORDER — HYDROMORPHONE HYDROCHLORIDE 2 MG/ML
1 INJECTION INTRAMUSCULAR; INTRAVENOUS; SUBCUTANEOUS EVERY 6 HOURS
Refills: 0 | Status: DISCONTINUED | OUTPATIENT
Start: 2020-08-01 | End: 2020-08-03

## 2020-08-01 RX ORDER — GLUCAGON INJECTION, SOLUTION 0.5 MG/.1ML
1 INJECTION, SOLUTION SUBCUTANEOUS ONCE
Refills: 0 | Status: DISCONTINUED | OUTPATIENT
Start: 2020-08-01 | End: 2020-08-04

## 2020-08-01 RX ORDER — ALBUMIN HUMAN 25 %
50 VIAL (ML) INTRAVENOUS ONCE
Refills: 0 | Status: DISCONTINUED | OUTPATIENT
Start: 2020-08-01 | End: 2020-08-01

## 2020-08-01 RX ORDER — DEXTROSE 50 % IN WATER 50 %
25 SYRINGE (ML) INTRAVENOUS ONCE
Refills: 0 | Status: COMPLETED | OUTPATIENT
Start: 2020-08-01 | End: 2020-08-01

## 2020-08-01 RX ORDER — NOREPINEPHRINE BITARTRATE/D5W 8 MG/250ML
0.05 PLASTIC BAG, INJECTION (ML) INTRAVENOUS
Qty: 8 | Refills: 0 | Status: DISCONTINUED | OUTPATIENT
Start: 2020-08-01 | End: 2020-08-01

## 2020-08-01 RX ORDER — SODIUM CHLORIDE 9 MG/ML
250 INJECTION INTRAMUSCULAR; INTRAVENOUS; SUBCUTANEOUS ONCE
Refills: 0 | Status: COMPLETED | OUTPATIENT
Start: 2020-08-01 | End: 2020-08-01

## 2020-08-01 RX ORDER — ALBUMIN HUMAN 25 %
50 VIAL (ML) INTRAVENOUS ONCE
Refills: 0 | Status: COMPLETED | OUTPATIENT
Start: 2020-08-01 | End: 2020-08-01

## 2020-08-01 RX ADMIN — PIPERACILLIN AND TAZOBACTAM 25 GRAM(S): 4; .5 INJECTION, POWDER, LYOPHILIZED, FOR SOLUTION INTRAVENOUS at 20:14

## 2020-08-01 RX ADMIN — CHLORHEXIDINE GLUCONATE 1 APPLICATION(S): 213 SOLUTION TOPICAL at 06:27

## 2020-08-01 RX ADMIN — MIDODRINE HYDROCHLORIDE 10 MILLIGRAM(S): 2.5 TABLET ORAL at 13:29

## 2020-08-01 RX ADMIN — SODIUM CHLORIDE 250 MILLILITER(S): 9 INJECTION INTRAMUSCULAR; INTRAVENOUS; SUBCUTANEOUS at 22:13

## 2020-08-01 RX ADMIN — PIPERACILLIN AND TAZOBACTAM 25 GRAM(S): 4; .5 INJECTION, POWDER, LYOPHILIZED, FOR SOLUTION INTRAVENOUS at 06:27

## 2020-08-01 RX ADMIN — Medication 25 MILLILITER(S): at 22:14

## 2020-08-01 RX ADMIN — MIDODRINE HYDROCHLORIDE 10 MILLIGRAM(S): 2.5 TABLET ORAL at 21:59

## 2020-08-01 RX ADMIN — Medication 50 MILLILITER(S): at 22:12

## 2020-08-01 RX ADMIN — MIDODRINE HYDROCHLORIDE 10 MILLIGRAM(S): 2.5 TABLET ORAL at 06:27

## 2020-08-01 RX ADMIN — Medication 250 MILLIGRAM(S): at 20:24

## 2020-08-01 RX ADMIN — PANTOPRAZOLE SODIUM 40 MILLIGRAM(S): 20 TABLET, DELAYED RELEASE ORAL at 06:27

## 2020-08-01 NOTE — CONSULT NOTE ADULT - ASSESSMENT
69 yr old F from [facility], with PMH of HTN, ESRD, DM, Hyperphosphatemia,  Oral cancer(s/p excision) , comes to the ED  with left arm AV fistula pain and swelling x1 week,sepsis due to staph epi, s/p ligation of AVF.  1.ICU monitoring.  2.Sepsis-ABX as per ID.  3.LV hyperdynamic and under filled, would benefit from volume-IVF/Blood.  4.ESRD-HD as per renal.  5.DM-Insulin.  6.Hypotension-midodrine, wean off pressor support.  7.GI and DVT prophylaxis.

## 2020-08-01 NOTE — CONSULT NOTE ADULT - SUBJECTIVE AND OBJECTIVE BOX
CHIEF COMPLAINT:Patient is a 69y old  Female who presents with a chief complaint of Painful swelling at AV fistula.      HPI:  69 yr old F from [facility], with PMH of HTN, ESRD, DM, Hyperphosphatemia,  Oral cancer(s/p excision) , comes to the ED  with left arm AV fistula pain and swelling x1 week. Pt was in her usual state of health until last week when she went for her HD session and developed gradual onset of swelling and pain at the AVF site on L arm. As per the patient the HD nurse had inserted the needle incorrectly that caused the swelling and severe pain. AVF however was not occluded and she had her HD sessions as per the schedule T/Th/Sa with her last HD session being yesterday.  Reports that she has chest pain in the mid chest that is chronic for years now.  Pt with staph epi sepsis, s/p AVF ligation, repeat blood cx negative but pt still requiring pressor support in ICU.      PAST MEDICAL & SURGICAL HISTORY:  DM (diabetes mellitus)  Vitamin D deficiency  ESRD (end stage renal disease)  Hyperphosphatemia  Hypertension  S/P bunionectomy: bilateral great toes  H/O hemorrhoidectomy  H/O oral cancer  AVF (arteriovenous fistula): x3      MEDICATIONS  (STANDING):  chlorhexidine 2% Cloths 1 Application(s) Topical <User Schedule>  insulin lispro (HumaLOG) corrective regimen sliding scale   SubCutaneous Before meals and at bedtime  midodrine 10 milliGRAM(s) Oral every 8 hours  norepinephrine Infusion 0.1 MICROgram(s)/kG/Min (3.78 mL/Hr) IV Continuous <Continuous>  pantoprazole    Tablet 40 milliGRAM(s) Oral before breakfast  piperacillin/tazobactam IVPB.. 3.375 Gram(s) IV Intermittent every 12 hours    MEDICATIONS  (PRN):  glucagon  Injectable 1 milliGRAM(s) IntraMuscular once PRN Glucose <70 milliGRAM(s)/deciLiter  HYDROmorphone  Injectable 1 milliGRAM(s) IV Push every 3 hours PRN Severe Pain (7 - 10)      FAMILY HISTORY:  Family history of diabetes mellitus in sister (Sibling): and HTN      SOCIAL HISTORY:    [x ] Non-smoker    [x ] Alcohol-denies    Allergies    sulfADIAZINE (Angioedema)    Intolerances    	    REVIEW OF SYSTEMS:  CONSTITUTIONAL: No fever, weight loss, or fatigue  EYES: No eye pain, visual disturbances, or discharge  ENT:  No difficulty hearing, tinnitus, vertigo; No sinus or throat pain  NECK: No pain or stiffness  RESPIRATORY: No cough, wheezing, chills or hemoptysis; No Shortness of Breath  CARDIOVASCULAR: No chest pain, palpitations, passing out, dizziness, or leg swelling  GASTROINTESTINAL: No abdominal or epigastric pain. No nausea, vomiting, or hematemesis; No diarrhea or constipation. No melena or hematochezia.  GENITOURINARY: No dysuria, frequency, hematuria, or incontinence  NEUROLOGICAL: No headaches, memory loss, loss of strength, numbness, or tremors  SKIN: No itching, burning, rashes, or lesions   LYMPH Nodes: No enlarged glands  ENDOCRINE: No heat or cold intolerance; No hair loss  MUSCULOSKELETAL: No joint pain or swelling; No muscle, back, or extremity pain  PSYCHIATRIC: No depression, anxiety, mood swings, or difficulty sleeping  HEME/LYMPH: No easy bruising, or bleeding gums  ALLERGY AND IMMUNOLOGIC: No hives or eczema	        PHYSICAL EXAM:  T(C): 36.4 (08-01-20 @ 04:15), Max: 36.4 (08-01-20 @ 04:15)  HR: 67 (08-01-20 @ 08:00) (55 - 134)  BP: 114/81 (08-01-20 @ 08:00) (93/66 - 156/82)  RR: 14 (08-01-20 @ 08:00) (2 - 25)  SpO2: 100% (08-01-20 @ 08:00) (96% - 100%)  Wt(kg): --  I&O's Summary    31 Jul 2020 07:01  -  01 Aug 2020 07:00  --------------------------------------------------------  IN: 93.3 mL / OUT: 0 mL / NET: 93.3 mL        Appearance: Normal	  HEENT:   Normal oral mucosa, PERRL, EOMI	  Lymphatic: No lymphadenopathy  Cardiovascular: Normal S1 S2, No JVD, No murmurs, No edema  Respiratory: Lungs clear to auscultation	  Psychiatry: A & O x 3, Mood & affect appropriate  Gastrointestinal:  Soft, Non-tender, + BS	  Skin: No rashes, No ecchymoses, No cyanosis	  Neurologic: Non-focal  Extremities: Normal range of motion, No clubbing, cyanosis or edema  Vascular: Peripheral pulses palpable 2+ bilaterally    	    ECG:  	Normal sinus rhythm  Voltage criteria for left ventricular hypertrophy  Prolonged QT    	  	  LABS:	 	      CARDIAC MARKERS ( 31 Jul 2020 02:45 )  <0.015 ng/mL / x     / 11 U/L / x     / <1.0 ng/mL                              8.5    14.60 )-----------( 291      ( 01 Aug 2020 06:04 )             25.8     08-01    133<L>  |  95<L>  |  21<H>  ----------------------------<  86  4.0   |  29  |  4.56<H>    Ca    8.9      01 Aug 2020 06:04  Phos  3.8     08-01  Mg     1.8     08-01    TPro  5.5<L>  /  Alb  1.4<L>  /  TBili  0.5  /  DBili  x   /  AST  14  /  ALT  17  /  AlkPhos  209<H>  08-01    OBSERVATIONS:  Mitral Valve: Normal mitral valve.  Aortic Root: Aortic Root: 2.7 cm.  Aortic Valve: Normal trileaflet aortic valve. Mild aortic  regurgitation.  Left Atrium: Normal left atrium.  LA volume index = 26  cc/m2.  Left Ventricle: Hyperdynamic left ventricular systolic  function (EF >70%). Mild concentric left ventricular  hypertrophy. Grade I diastolic dysfunction (Impaired  relaxation).  Right Heart: Normal right atrium. Normal rightventricular  size and systolic function (TAPSE  2.1cm). Normal tricuspid  valve. Normal pulmonic valve.  Pericardium/PleuraNormal pericardium with no pericardial  effusion.  Hemodynamic: Unable to estimate RVSP.    Culture - Blood (07.31.20 @ 08:06)    Specimen Source: .Blood Blood-Peripheral    Culture Results:   No growth to date.    Culture - Blood (07.31.20 @ 08:06)    Specimen Source: .Blood Blood-Peripheral    Culture Results:   No growth to date.    Culture - Surgical Swab (07.31.20 @ 06:41)    Specimen Source: .Surgical Swab left arm av graft C&S    Culture Results:   No growth        Culture - Urine (07.30.20 @ 01:34)    Specimen Source: .Urine Clean Catch (Midstream)    Culture Results:   >=3 organisms. Probable collection contamination.    Culture - Blood (07.29.20 @ 23:02)    -  Coagulase negative Staphylococcus: Detec    Gram Stain:   Growth in anaerobic bottle: Gram Positive Cocci in Clusters  Growth in aerobic bottle: Gram Positive Cocci in Clusters    -  Ampicillin/Sulbactam: R <=8/4    -  Cefazolin: R <=4    -  Clindamycin: R >4    -  Erythromycin: R >4    -  Gentamicin: R >8 Should not be used as monotherapy    -  Oxacillin: R >2    -  Penicillin: R 8    -  RIF- Rifampin: S <=1 Should not be used as monotherapy    -  Tetra/Doxy: S <=1    -  Trimethoprim/Sulfamethoxazole: R >2/38    -  Vancomycin: S 2    Specimen Source: .Blood Blood-Peripheral    Organism: Blood Culture PCR    Organism: Staphylococcus epidermidis    Culture Results:   Growth in aerobic and anaerobic bottles: Staphylococcus epidermidis  "Due to technical problems, Proteus sp. will Not be reported as part of  the BCID panel until further notice"  ***Blood Panel PCR results on this specimen are available  approximately 3 hours after the Gram stain result.***  Gram stain, PCR, and/or culture results may not always  correspond due to difference in methodologies.  ************************************************************  This PCR assay was performed using [employer]  The following targets are tested for: Enterococcus,  vancomycin resistant enterococci, Listeria monocytogenes,  coagulase negative staphylococci, S. aureus,  methicillin resistant S. aureus, Streptococcus agalactiae  (Group B), S. pneumoniae, S. pyogenes (Group A),  Acinetobacter baumannii, Enterobacter cloacae, E. coli,  Klebsiella oxytoca, K. pneumoniae, Proteus sp.,  Serratia marcescens, Haemophilus influenzae,  Neisseria meningitidis, Pseudomonas aeruginosa, Candida  albicans, C. glabrata, C krusei, C parapsilosis,  C. tropicalis and the KPC resistance gene.    Organism Identification: Blood Culture PCR  Staphylococcus epidermidis    Method Type: PCR    Method Type: JOSÉ MIGUEL

## 2020-08-01 NOTE — PROGRESS NOTE ADULT - SUBJECTIVE AND OBJECTIVE BOX
Patient is a 69y old  Female who presents with a chief complaint of Painful swelling at AV fistula. (01 Aug 2020 01:46)    pt seen in icu [ x ], reg med floor [   ], bed [ x ], chair at bedside [   ], a+o x3 [ x ], lethargic [  ],    nad [ x ]    pt admitted to icu s/p AVG excision and ligation for post op monitoring.         Allergies    sulfADIAZINE (Angioedema)        Vitals    T(F): 97.5 (08-01-20 @ 04:15), Max: 97.5 (08-01-20 @ 04:15)  HR: 76 (08-01-20 @ 04:15) (55 - 134)  BP: 98/67 (08-01-20 @ 04:15) (75/59 - 194/90)  RR: 12 (08-01-20 @ 04:15) (0 - 25)  SpO2: 99% (08-01-20 @ 04:15) (56% - 100%)  Wt(kg): --  CAPILLARY BLOOD GLUCOSE      POCT Blood Glucose.: 76 mg/dL (01 Aug 2020 04:45)      Labs                          8.5    14.60 )-----------( 291      ( 01 Aug 2020 06:04 )             25.8       07-31    134<L>  |  98  |  17  ----------------------------<  120<H>  3.7   |  29  |  3.84<H>    Ca    8.4      31 Jul 2020 08:16  Phos  2.1     07-31  Mg     1.6     07-31    TPro  5.6<L>  /  Alb  1.4<L>  /  TBili  0.6  /  DBili  x   /  AST  22  /  ALT  21  /  AlkPhos  216<H>  07-31      CARDIAC MARKERS ( 31 Jul 2020 02:45 )  <0.015 ng/mL / x     / 11 U/L / x     / <1.0 ng/mL  CARDIAC MARKERS ( 30 Jul 2020 06:31 )  <0.015 ng/mL / x     / x     / x     / x            .Urine Clean Catch (Midstream)  07-30 @ 01:34   >=3 organisms. Probable collection contamination.  --  --      .Blood Blood-Peripheral  07-29 @ 23:02   Growth in aerobic and anaerobic bottles: Staphylococcus epidermidis  "Due to technical problems, Proteus sp. will Not be reported as part of  the BCID panel until further notice"  ***Blood Panel PCR results on this specimen are available  approximately 3 hours after the Gram stain result.***  Gram stain, PCR, and/or culture results may not always  correspond due to difference in methodologies.  ************************************************************  This PCR assay was performed using [facility].  The following targets are tested for: Enterococcus,  vancomycin resistant enterococci, Listeria monocytogenes,  coagulase negative staphylococci, S. aureus,  methicillin resistant S. aureus, Streptococcus agalactiae  (Group B), S. pneumoniae, S. pyogenes (Group A),  Acinetobacter baumannii, Enterobacter cloacae, E. coli,  Klebsiella oxytoca, K. pneumoniae, Proteus sp.,  Serratia marcescens, Haemophilus influenzae,  Neisseria meningitidis, Pseudomonas aeruginosa, Candida  albicans, C. glabrata, C krusei, C parapsilosis,  C. tropicalis and the KPC resistance gene.  --  Blood Culture PCR          Radiology Results      Meds    MEDICATIONS  (STANDING):  chlorhexidine 2% Cloths 1 Application(s) Topical <User Schedule>  insulin lispro (HumaLOG) corrective regimen sliding scale   SubCutaneous Before meals and at bedtime  midodrine 10 milliGRAM(s) Oral every 8 hours  norepinephrine Infusion 0.1 MICROgram(s)/kG/Min (3.78 mL/Hr) IV Continuous <Continuous>  pantoprazole    Tablet 40 milliGRAM(s) Oral before breakfast  piperacillin/tazobactam IVPB.. 3.375 Gram(s) IV Intermittent every 12 hours      MEDICATIONS  (PRN):  glucagon  Injectable 1 milliGRAM(s) IntraMuscular once PRN Glucose <70 milliGRAM(s)/deciLiter  HYDROmorphone  Injectable 1 milliGRAM(s) IV Push every 3 hours PRN Severe Pain (7 - 10)      Physical Exam    Neuro :  no focal deficits  Respiratory: CTA B/L  CV: RRR, S1S2, no murmurs,   Abdominal: Soft, NT, ND +BS,  Extremities: No edema, + peripheral pulses, left ue dressing cdi, distal forearm and hand edema,      ASSESSMENT      Infected prosthetic vascular graft LUE  Pseudoaneurysm of LUE arteriovenous graft  Cellulitis of left upper extremity    uti  h/o ESRD on hd,   DM (diabetes mellitus)  Vitamin D deficiency  Hyperphosphatemia  Hypertension  S/P bunionectomy  hemorrhoidectomy  oral cancer  left AVF (arteriovenous fistula)      PLAN      s/p Excision, infected graft, extremity and Ligation of arteriovenous fistula or access graft of upper extremity 7/30/20  vasc surg f/u    cont pain control  cont wound care  gi/dvt prophylaxis  cont zosyn,   cont  vanco with hd  id f/u   Vancomycin level in AM and re-dose if level less than 20  blood cx with cns noted above  rept blood cx   s/p right femoral shiley placement  renal cons  cont hd as per renal   hgba1c 5.4  hss  cont current meds Patient is a 69y old  Female who presents with a chief complaint of Painful swelling at AV fistula. (01 Aug 2020 01:46)    pt seen in icu [ x ], reg med floor [   ], bed [ x ], chair at bedside [   ], a+o x3 [ x ], lethargic [  ],    nad [ x ]    Allergies    sulfADIAZINE (Angioedema)        Vitals    T(F): 97.5 (08-01-20 @ 04:15), Max: 97.5 (08-01-20 @ 04:15)  HR: 76 (08-01-20 @ 04:15) (55 - 134)  BP: 98/67 (08-01-20 @ 04:15) (75/59 - 194/90)  RR: 12 (08-01-20 @ 04:15) (0 - 25)  SpO2: 99% (08-01-20 @ 04:15) (56% - 100%)  Wt(kg): --  CAPILLARY BLOOD GLUCOSE      POCT Blood Glucose.: 76 mg/dL (01 Aug 2020 04:45)      Labs                          8.5    14.60 )-----------( 291      ( 01 Aug 2020 06:04 )             25.8       07-31    134<L>  |  98  |  17  ----------------------------<  120<H>  3.7   |  29  |  3.84<H>    Ca    8.4      31 Jul 2020 08:16  Phos  2.1     07-31  Mg     1.6     07-31    TPro  5.6<L>  /  Alb  1.4<L>  /  TBili  0.6  /  DBili  x   /  AST  22  /  ALT  21  /  AlkPhos  216<H>  07-31      CARDIAC MARKERS ( 31 Jul 2020 02:45 )  <0.015 ng/mL / x     / 11 U/L / x     / <1.0 ng/mL  CARDIAC MARKERS ( 30 Jul 2020 06:31 )  <0.015 ng/mL / x     / x     / x     / x            .Urine Clean Catch (Midstream)  07-30 @ 01:34   >=3 organisms. Probable collection contamination.  --  --      .Blood Blood-Peripheral  07-29 @ 23:02   Growth in aerobic and anaerobic bottles: Staphylococcus epidermidis  "Due to technical problems, Proteus sp. will Not be reported as part of  the BCID panel until further notice"  ***Blood Panel PCR results on this specimen are available  approximately 3 hours after the Gram stain result.***  Gram stain, PCR, and/or culture results may not always  correspond due to difference in methodologies.  ************************************************************  This PCR assay was performed using [employer].  The following targets are tested for: Enterococcus,  vancomycin resistant enterococci, Listeria monocytogenes,  coagulase negative staphylococci, S. aureus,  methicillin resistant S. aureus, Streptococcus agalactiae  (Group B), S. pneumoniae, S. pyogenes (Group A),  Acinetobacter baumannii, Enterobacter cloacae, E. coli,  Klebsiella oxytoca, K. pneumoniae, Proteus sp.,  Serratia marcescens, Haemophilus influenzae,  Neisseria meningitidis, Pseudomonas aeruginosa, Candida  albicans, C. glabrata, C krusei, C parapsilosis,  C. tropicalis and the KPC resistance gene.  --  Blood Culture PCR          Radiology Results      Meds    MEDICATIONS  (STANDING):  chlorhexidine 2% Cloths 1 Application(s) Topical <User Schedule>  insulin lispro (HumaLOG) corrective regimen sliding scale   SubCutaneous Before meals and at bedtime  midodrine 10 milliGRAM(s) Oral every 8 hours  norepinephrine Infusion 0.1 MICROgram(s)/kG/Min (3.78 mL/Hr) IV Continuous <Continuous>  pantoprazole    Tablet 40 milliGRAM(s) Oral before breakfast  piperacillin/tazobactam IVPB.. 3.375 Gram(s) IV Intermittent every 12 hours      MEDICATIONS  (PRN):  glucagon  Injectable 1 milliGRAM(s) IntraMuscular once PRN Glucose <70 milliGRAM(s)/deciLiter  HYDROmorphone  Injectable 1 milliGRAM(s) IV Push every 3 hours PRN Severe Pain (7 - 10)      Physical Exam    Neuro :  no focal deficits  Respiratory: CTA B/L  CV: RRR, S1S2, no murmurs,   Abdominal: Soft, NT, ND +BS,  Extremities: No edema, + peripheral pulses, left ue dressing cdi, distal forearm and hand edema,      ASSESSMENT      Infected prosthetic vascular graft LUE  Pseudoaneurysm of LUE arteriovenous graft  Cellulitis of left upper extremity    uti  h/o ESRD on hd,   DM (diabetes mellitus)  Vitamin D deficiency  Hyperphosphatemia  Hypertension  S/P bunionectomy  hemorrhoidectomy  oral cancer  left AVF (arteriovenous fistula)      PLAN      s/p Excision, infected graft, extremity and Ligation of arteriovenous fistula or access graft of upper extremity 7/30/20  vasc surg f/u    cont pain control  cont wound care  gi/dvt prophylaxis  cont zosyn,   cont  vanco with hd  id f/u   blood cx with  Staphylococcus epidermidis noted above  f/u rept blood cx   f/u echo  s/p right femoral shiley placement  renal f/u   dialysis held yesterday 2nd to hypotension   pt for hd today  cont hd as per renal   pt on phenylephrine  hgba1c 5.4   endo f/u   hss  cont current meds   mgmt as per icu

## 2020-08-01 NOTE — PROGRESS NOTE ADULT - ASSESSMENT
69F with ESRD post ligation and surgery of left AVF,  hypophosphatemia resolved, H/H stable  with Gram positive clusters , coagulase negative in blood, placed on Vancomycin and Zosyn    - daily dressing changes with xeroform, dry gauze and kerlex  - neurovascular exams  - cont IV abx as per ID/Cxs  - dialysis as per HD today, wean off pressor as tolerated after dialysis  - cont care as per ICU

## 2020-08-01 NOTE — PROGRESS NOTE ADULT - SUBJECTIVE AND OBJECTIVE BOX
Problem List:  ESRD for 4 years on dialysis, last dialysis was on 07/28.  She came to the ER for swelling  and pain in left AVF .  Patient underwent surgery excision of fistula.  Culture - Blood (07.29.20 @ 23:02)    Gram Stain:   Growth in anaerobic bottle: Gram Positive Cocci in Clusters  Coagulase negative  Dialysis catheter was placed in right femoral area.  In AM she was checked by surgery for ? compartment syndrome .  Her hand was swollen and cold, pulses were there.    She says her BP is normal.  Reduced appetite in the last year and weight loss. No vomit or nausea.  C/O sore throat         PAST MEDICAL & SURGICAL HISTORY:  DM (diabetes mellitus)  Vitamin D deficiency  ESRD (end stage renal disease)  Hyperphosphatemia  Hypertension  S/P bunionectomy: bilateral great toes  H/O hemorrhoidectomy  H/O oral cancer  AVF (arteriovenous fistula): x3      sulfADIAZINE (Angioedema)      MEDICATIONS  (STANDING):  chlorhexidine 2% Cloths 1 Application(s) Topical <User Schedule>  insulin lispro (HumaLOG) corrective regimen sliding scale   SubCutaneous Before meals and at bedtime  midodrine 10 milliGRAM(s) Oral every 8 hours  norepinephrine Infusion 0.1 MICROgram(s)/kG/Min (3.78 mL/Hr) IV Continuous <Continuous>  pantoprazole    Tablet 40 milliGRAM(s) Oral before breakfast  piperacillin/tazobactam IVPB.. 3.375 Gram(s) IV Intermittent every 12 hours    MEDICATIONS  (PRN):  glucagon  Injectable 1 milliGRAM(s) IntraMuscular once PRN Glucose <70 milliGRAM(s)/deciLiter  HYDROmorphone  Injectable 1 milliGRAM(s) IV Push every 3 hours PRN Severe Pain (7 - 10)                            8.5    14.60 )-----------( 291      ( 01 Aug 2020 06:04 )             25.8     08-01    133<L>  |  95<L>  |  21<H>  ----------------------------<  86  4.0   |  29  |  4.56<H>    Ca    8.9      01 Aug 2020 06:04  Phos  3.8     08-01  Mg     1.8     08-01    TPro  5.5<L>  /  Alb  1.4<L>  /  TBili  0.5  /  DBili  x   /  AST  14  /  ALT  17  /  AlkPhos  209<H>  08-01    PT/INR - ( 31 Jul 2020 02:45 )   PT: 16.7 sec;   INR: 1.45 ratio         Staph epi in blood , repeat cultures are negative        REVIEW OF SYSTEMS:  General: no fever no chills, no weight loss.  EYES/ENT: No visual changes;  No vertigo, no headache.  Pain in left Upper extremity  RESPIRATORY: No cough, wheezing, hemoptysis; No shortness of breath  CARDIOVASCULAR: No chest pain or palpitations. No Edema  GASTROINTESTINAL: No abdominal or epigastric pain. No nausea, vomiting. No diarrhea or constipation. No melena.  GENITOURINARY: No dysuria, frequency, foamy urine, urinary urgency, incontinence or hematuria  NEUROLOGICAL: No numbness or weakness, no tremor , no dizziness.   .        VITALS:  T(F): 97.5 (08-01-20 @ 04:15), Max: 97.5 (08-01-20 @ 04:15)  HR: 67 (08-01-20 @ 08:00)  BP: 114/81 (08-01-20 @ 08:00)  RR: 14 (08-01-20 @ 08:00)  SpO2: 100% (08-01-20 @ 08:00)  Wt(kg): --    07-31 @ 07:01  -  08-01 @ 07:00  --------------------------------------------------------  IN: 93.3 mL / OUT: 0 mL / NET: 93.3 mL        PHYSICAL EXAM:  Constitutional: cachectic, no diaphoresis, no distress.  Neck: No JVD, no carotid bruit, supple, no adenopathy  Respiratory: Good air entrance B/L, no wheezes, rales or rhonchi  Cardiovascular: S1, S2, RRR, no pericardial rub, no murmur  Abdomen: BS+, soft, no tenderness, no bruit  Pelvis: bladder nondistended  Extremities: No cyanosis or clubbing. No peripheral edema.     Vascular Access:  right femoral cathetr

## 2020-08-01 NOTE — PROGRESS NOTE ADULT - ASSESSMENT
ESRD post ligation and surgery of left AVF.  Gram positive clusters , coagulase negative in blood AND WAS PLACED ON Vancomycin and Zosyn, repeat cultures are negative  Anemia  Hypophosphatemia, repeat Phosphorus Level, Serum: 3.8 mg/dL (08.01.20 @ 06:04)    Hypoalbuminemia.  Malnutrition, oral cancer.  Hypotension earlier.    Dialysis today via femoral cathetr.

## 2020-08-01 NOTE — PROGRESS NOTE ADULT - ASSESSMENT
68 yo F ESRD on HD, HTN, DM, Oral ca came in L Arm AVG infection coag neg staph and pseudoaneurysm, pt was taken to OR for AVG excision and ligation. Pt was brought to ICU for post op monitoring.       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 Flores, recommends no HD for now     ID  # Bacteremia (coag neg staph) likely from AV graft infection   - s/p removal of AV graft on 7/30   - Currently on vanc and zosyn   - last dose of vanc was on 7/30   - Lactate 2.3  - Vanc level 29 (7/31/20) holding vanco for now ****  - f/u pre dialysis vancomycin level, given vancomycin 1gm intra dialysis if vanc level <20   - F/u echo to r/o endocarditis   - F/u culture sensitivity   - F/u repeat blood culture   - ID Dr. Ludy Duran   # AVG pseudoaneurysm   - s/p AVG removal and ligation 7/30/20  - monitor for sings of bleeding   - cbc q6  - Check pulses q 2hrs   - vascular surgeon Dr. Manzanares     Heme onco  # Anemia likely from CKD, presented with hb 7.1 s/p 1 prbc currently hb 9.6  - epogen during dialysis   - monitor cbc q6 for sings of bleeding      Endo  # hx of DM on hss at home   - target CBG >140 and < 180  - FS achs    Prophylaxis   - Dvt : will hold chemical dvt ppx for now given concern for bleeding, on SCD prophylaxis *   - GI : Protonix 68 yo F ESRD on HD, HTN, DM, Oral ca came in L Arm AVG infection coag neg staph and pseudoaneurysm, pt was taken to OR for AVG excision and ligation. Pt was brought to ICU for post op monitoring.       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 Flores    ID  # Bacteremia (coag neg staph) likely from AV graft infection   - s/p removal of AV graft on 7/30   - Currently on vanc and zosyn   - last dose of vanc was on 7/30   - Lactate 2.3  - Vanc level 29 (7/31/20) holding vanco for now ****  - f/u pre dialysis vancomycin level, given vancomycin 1gm intra dialysis if vanc level <20   - F/u echo to r/o endocarditis   - F/u culture sensitivity   - F/u repeat blood culture   - ID Dr. Ludy Duran   # AVG pseudoaneurysm   - s/p AVG removal and ligation 7/30/20  - monitor for sings of bleeding   - cbc q6  - Check pulses q 2hrs   - vascular surgeon Dr. Manzanares     Heme onco  # Anemia likely from CKD, presented with hb 7.1 s/p 1 prbc currently hb 9.6  - epogen during dialysis   - monitor cbc q6 for sings of bleeding      Endo  # hx of DM on hss at home   - target CBG >140 and < 180  - FS achs    Prophylaxis   - Dvt : will hold chemical dvt ppx for now given concern for bleeding, on SCD prophylaxis *   - GI : Protonix

## 2020-08-01 NOTE — PROGRESS NOTE ADULT - SUBJECTIVE AND OBJECTIVE BOX
INTERVAL HPI/OVERNIGHT EVENTS:  No acute events overnight. Pt resting comfortably. C/o pain in left upper extremity. On minimal dose of levophed.    MEDICATIONS  (STANDING):  chlorhexidine 2% Cloths 1 Application(s) Topical <User Schedule>  insulin lispro (HumaLOG) corrective regimen sliding scale   SubCutaneous Before meals and at bedtime  midodrine 10 milliGRAM(s) Oral every 8 hours  norepinephrine Infusion 0.1 MICROgram(s)/kG/Min (3.78 mL/Hr) IV Continuous <Continuous>  pantoprazole    Tablet 40 milliGRAM(s) Oral before breakfast  piperacillin/tazobactam IVPB.. 3.375 Gram(s) IV Intermittent every 12 hours    MEDICATIONS  (PRN):  glucagon  Injectable 1 milliGRAM(s) IntraMuscular once PRN Glucose <70 milliGRAM(s)/deciLiter  HYDROmorphone  Injectable 1 milliGRAM(s) IV Push every 3 hours PRN Severe Pain (7 - 10)    Vital Signs Last 24 Hrs  T(C): 36.4 (01 Aug 2020 04:15), Max: 36.4 (01 Aug 2020 04:15)  T(F): 97.5 (01 Aug 2020 04:15), Max: 97.5 (01 Aug 2020 04:15)  HR: 63 (01 Aug 2020 07:30) (55 - 134)  BP: 117/76 (01 Aug 2020 07:30) (75/59 - 194/90)  BP(mean): 83 (01 Aug 2020 07:30) (60 - 116)  RR: 12 (01 Aug 2020 07:30) (0 - 25)  SpO2: 100% (01 Aug 2020 07:30) (56% - 100%)    Physical:  General: Alert and oriented, not in acute distress  Resp: Breathing unlabored  Extremities: anteromedial left upper extremity wound with no active bleeding or drainage; dressing with serosanguinous drainage removed and changed; wound redressed with xeroform, dry gauze and kerlex. hand with swelling, 2+ distal pulses dopplerable    I&O's Detail  31 Jul 2020 07:01  -  01 Aug 2020 07:00  --------------------------------------------------------  IN:    IV PiggyBack: 50 mL    norepinephrine Infusion: 43.3 mL  Total IN: 93.3 mL    OUT:  Total OUT: 0 mL  Total NET: 93.3 mL    LABS:                      8.5    14.60 )-----------( 291      ( 01 Aug 2020 06:04 )             25.8             08-01    133<L>  |  95<L>  |  21<H>  ----------------------------<  86  4.0   |  29  |  4.56<H>    Ca    8.9      01 Aug 2020 06:04  Phos  3.8     08-01  Mg     1.8     08-01    TPro  5.5<L>  /  Alb  1.4<L>  /  TBili  0.5  /  DBili  x   /  AST  14  /  ALT  17  /  AlkPhos  209<H>  08-01

## 2020-08-01 NOTE — PROGRESS NOTE ADULT - SUBJECTIVE AND OBJECTIVE BOX
INTERVAL HPI/OVERNIGHT EVENTS: Hb slightly dropped, but around baseline    PRESSORS: [x ] YES [ ] NO  WHICH: levophed    ANTIBIOTICS:                      Antimicrobial:  piperacillin/tazobactam IVPB.. 3.375 Gram(s) IV Intermittent every 12 hours    Cardiovascular:  midodrine 10 milliGRAM(s) Oral every 8 hours  norepinephrine Infusion 0.1 MICROgram(s)/kG/Min IV Continuous <Continuous>    Other:  chlorhexidine 2% Cloths 1 Application(s) Topical <User Schedule>  glucagon  Injectable 1 milliGRAM(s) IntraMuscular once PRN  HYDROmorphone  Injectable 1 milliGRAM(s) IV Push every 3 hours PRN  insulin lispro (HumaLOG) corrective regimen sliding scale   SubCutaneous Before meals and at bedtime  pantoprazole    Tablet 40 milliGRAM(s) Oral before breakfast    chlorhexidine 2% Cloths 1 Application(s) Topical <User Schedule>  glucagon  Injectable 1 milliGRAM(s) IntraMuscular once PRN  HYDROmorphone  Injectable 1 milliGRAM(s) IV Push every 3 hours PRN  insulin lispro (HumaLOG) corrective regimen sliding scale   SubCutaneous Before meals and at bedtime  midodrine 10 milliGRAM(s) Oral every 8 hours  norepinephrine Infusion 0.1 MICROgram(s)/kG/Min IV Continuous <Continuous>  pantoprazole    Tablet 40 milliGRAM(s) Oral before breakfast  piperacillin/tazobactam IVPB.. 3.375 Gram(s) IV Intermittent every 12 hours    Drug Dosing Weight  Height (cm): 157.48 (29 Jul 2020 14:57)  Weight (kg): 40.3 (31 Jul 2020 01:35)  BMI (kg/m2): 16.3 (31 Jul 2020 01:35)  BSA (m2): 1.35 (31 Jul 2020 01:35)    CENTRAL LINE: [ ] YES [ ] NO  LOCATION:   DATE INSERTED:  REMOVE: [ ] YES [ ] NO  EXPLAIN:    CANALES: [ ] YES [ ] NO    DATE INSERTED:  REMOVE:  [ ] YES [ ] NO  EXPLAIN:    A-LINE:  [ ] YES [ ] NO  LOCATION:   DATE INSERTED:  REMOVE:  [ ] YES [ ] NO  EXPLAIN:    PMH -reviewed admission note, no change since admission    ICU Vital Signs Last 24 Hrs  T(C): 36.1 (31 Jul 2020 14:30), Max: 36.2 (31 Jul 2020 04:00)  T(F): 97 (31 Jul 2020 14:30), Max: 97.2 (31 Jul 2020 04:00)  HR: 59 (31 Jul 2020 23:30) (55 - 98)  BP: 109/70 (31 Jul 2020 23:30) (75/59 - 194/90)  BP(mean): 80 (31 Jul 2020 23:30) (60 - 116)  RR: 9 (31 Jul 2020 23:30) (0 - 21)  SpO2: 99% (31 Jul 2020 23:30) (56% - 100%)    07-30 @ 07:01  -  07-31 @ 07:00  --------------------------------------------------------  IN: 600 mL / OUT: 0 mL / NET: 600 mL    PHYSICAL EXAM:    GENERAL: NAD, cachetic   EYES: EOMI, PERRLA,   NECK: Supple, No JVD; Normal thyroid; Trachea midline  NERVOUS SYSTEM:   Motor Strength 5/5 B/L upper and lower extremities;  DTRs 2+ intact and symmetric  CHEST/LUNG: No rales, rhonchi, wheezing   HEART: Regular rate and rhythm; No murmurs,   ABDOMEN: Soft, Nontender, Nondistended; Bowel sounds present  EXTREMITIES:  2+ Peripheral Pulses, No clubbing, cyanosis, or edema, On SCD  No bleeding from fistula repair site      LABS:  CBC Full  -  ( 31 Jul 2020 22:29 )  WBC Count : 14.15 K/uL  RBC Count : 2.79 M/uL  Hemoglobin : 8.3 g/dL  Hematocrit : 24.9 %  Platelet Count - Automated : 255 K/uL  Mean Cell Volume : 89.2 fl  Mean Cell Hemoglobin : 29.7 pg  Mean Cell Hemoglobin Concentration : 33.3 gm/dL  Auto Neutrophil # : x  Auto Lymphocyte # : x  Auto Monocyte # : x  Auto Eosinophil # : x  Auto Basophil # : x  Auto Neutrophil % : x  Auto Lymphocyte % : x  Auto Monocyte % : x  Auto Eosinophil % : x  Auto Basophil % : x    07-31    134<L>  |  98  |  17  ----------------------------<  120<H>  3.7   |  29  |  3.84<H>    Ca    8.4      31 Jul 2020 08:16  Phos  2.1     07-31  Mg     1.6     07-31    TPro  5.6<L>  /  Alb  1.4<L>  /  TBili  0.6  /  DBili  x   /  AST  22  /  ALT  21  /  AlkPhos  216<H>  07-31    PT/INR - ( 31 Jul 2020 02:45 )   PT: 16.7 sec;   INR: 1.45 ratio             Culture Results:   >=3 organisms. Probable collection contamination. (07-30 @ 01:34)  Culture Results:   Growth in aerobic and anaerobic bottles: Staphylococcus epidermidis  "Due to technical problems, Proteus sp. will Not be reported as part of  the BCID panel until further notice"  ***Blood Panel PCR results on this specimen are available  approximately 3 hours after the Gram stain result.***  Gram stain, PCR, and/or culture results may not always  correspond due to difference in methodologies.  ************************************************************  This PCR assay was performed using [facility].  The following targets are tested for: Enterococcus,  vancomycin resistant enterococci, Listeria monocytogenes,  coagulase negative staphylococci, S. aureus,  methicillin resistant S. aureus, Streptococcus agalactiae  (Group B), S. pneumoniae, S. pyogenes (Group A),  Acinetobacter baumannii, Enterobacter cloacae, E. coli,  Klebsiella oxytoca, K. pneumoniae, Proteus sp.,  Serratia marcescens, Haemophilus influenzae,  Neisseria meningitidis, Pseudomonas aeruginosa, Candida  albicans, C. glabrata, C krusei, C parapsilosis,  C. tropicalis and the KPC resistance gene. (07-29 @ 23:02)  Culture Results:   Growth in aerobic and anaerobic bottles: Staphylococcus epidermidis  Susceptibility to follow.  "Due to technical problems, Proteus sp. will Not be reported as part of  the BCID panel until further notice"  ***Blood Panel PCR results on this specimen are available  approximately 3 hours after the Gram stain result.***  Gram stain, PCR, and/or culture results may not always  correspond due to difference in methodologies.  ************************************************************  This PCR assay was performed using [facility].  The following targets are tested for: Enterococcus,  vancomycin resistant enterococci, Listeria monocytogenes,  coagulase negative staphylococci, S. aureus,  methicillin resistant S. aureus, Streptococcus agalactiae  (Group B), S. pneumoniae, S. pyogenes (Group A),  Acinetobacter baumannii, Enterobacter cloacae, E. coli,  Klebsiella oxytoca, K. pneumoniae, Proteus sp.,  Serratia marcescens, Haemophilus influenzae,  Neisseria meningitidis, Pseudomonas aeruginosa, Candida  albicans, C. glabrata, C krusei, C parapsilosis,  C. tropicalis and the KPC resistance gene. (07-29 @ 23:02)      RADIOLOGY & ADDITIONAL STUDIES REVIEWED:  ***    GOALS OF CARE DISCUSSION WITH PATIENT/FAMILY/PROXY:    CRITICAL CARE TIME SPENT: 35 minutes INTERVAL HPI/OVERNIGHT EVENTS: Hb around baseline. Will get HD today, will get VT pre-dialysis and will give Vancomycin 1 gm intra-dialysis today,     PRESSORS: [x ] YES [ ] NO  WHICH: levophed    ANTIBIOTICS:                      Antimicrobial:  piperacillin/tazobactam IVPB.. 3.375 Gram(s) IV Intermittent every 12 hours    Cardiovascular:  midodrine 10 milliGRAM(s) Oral every 8 hours  norepinephrine Infusion 0.1 MICROgram(s)/kG/Min IV Continuous <Continuous>    Other:  chlorhexidine 2% Cloths 1 Application(s) Topical <User Schedule>  glucagon  Injectable 1 milliGRAM(s) IntraMuscular once PRN  HYDROmorphone  Injectable 1 milliGRAM(s) IV Push every 3 hours PRN  insulin lispro (HumaLOG) corrective regimen sliding scale   SubCutaneous Before meals and at bedtime  pantoprazole    Tablet 40 milliGRAM(s) Oral before breakfast    chlorhexidine 2% Cloths 1 Application(s) Topical <User Schedule>  glucagon  Injectable 1 milliGRAM(s) IntraMuscular once PRN  HYDROmorphone  Injectable 1 milliGRAM(s) IV Push every 3 hours PRN  insulin lispro (HumaLOG) corrective regimen sliding scale   SubCutaneous Before meals and at bedtime  midodrine 10 milliGRAM(s) Oral every 8 hours  norepinephrine Infusion 0.1 MICROgram(s)/kG/Min IV Continuous <Continuous>  pantoprazole    Tablet 40 milliGRAM(s) Oral before breakfast  piperacillin/tazobactam IVPB.. 3.375 Gram(s) IV Intermittent every 12 hours    Drug Dosing Weight  Height (cm): 157.48 (29 Jul 2020 14:57)  Weight (kg): 40.3 (31 Jul 2020 01:35)  BMI (kg/m2): 16.3 (31 Jul 2020 01:35)  BSA (m2): 1.35 (31 Jul 2020 01:35)    CENTRAL LINE: [ ] YES [ ] NO  LOCATION:   DATE INSERTED:  REMOVE: [ ] YES [ ] NO  EXPLAIN:    CANALES: [ ] YES [ ] NO    DATE INSERTED:  REMOVE:  [ ] YES [ ] NO  EXPLAIN:    A-LINE:  [ ] YES [ ] NO  LOCATION:   DATE INSERTED:  REMOVE:  [ ] YES [ ] NO  EXPLAIN:    PMH -reviewed admission note, no change since admission    ICU Vital Signs Last 24 Hrs  T(C): 36.1 (31 Jul 2020 14:30), Max: 36.2 (31 Jul 2020 04:00)  T(F): 97 (31 Jul 2020 14:30), Max: 97.2 (31 Jul 2020 04:00)  HR: 59 (31 Jul 2020 23:30) (55 - 98)  BP: 109/70 (31 Jul 2020 23:30) (75/59 - 194/90)  BP(mean): 80 (31 Jul 2020 23:30) (60 - 116)  RR: 9 (31 Jul 2020 23:30) (0 - 21)  SpO2: 99% (31 Jul 2020 23:30) (56% - 100%)    07-30 @ 07:01  -  07-31 @ 07:00  --------------------------------------------------------  IN: 600 mL / OUT: 0 mL / NET: 600 mL    PHYSICAL EXAM:    GENERAL: NAD, cachetic   EYES: EOMI, PERRLA,   NECK: Supple, No JVD; Normal thyroid; Trachea midline  NERVOUS SYSTEM:   Motor Strength 5/5 B/L upper and lower extremities;  DTRs 2+ intact and symmetric  CHEST/LUNG: No rales, rhonchi, wheezing   HEART: Regular rate and rhythm; No murmurs,   ABDOMEN: Soft, Nontender, Nondistended; Bowel sounds present  EXTREMITIES:  2+ Peripheral Pulses, No clubbing, cyanosis, or edema, On SCD  No bleeding from fistula repair site      LABS:  CBC Full  -  ( 31 Jul 2020 22:29 )  WBC Count : 14.15 K/uL  RBC Count : 2.79 M/uL  Hemoglobin : 8.3 g/dL  Hematocrit : 24.9 %  Platelet Count - Automated : 255 K/uL  Mean Cell Volume : 89.2 fl  Mean Cell Hemoglobin : 29.7 pg  Mean Cell Hemoglobin Concentration : 33.3 gm/dL  Auto Neutrophil # : x  Auto Lymphocyte # : x  Auto Monocyte # : x  Auto Eosinophil # : x  Auto Basophil # : x  Auto Neutrophil % : x  Auto Lymphocyte % : x  Auto Monocyte % : x  Auto Eosinophil % : x  Auto Basophil % : x    07-31    134<L>  |  98  |  17  ----------------------------<  120<H>  3.7   |  29  |  3.84<H>    Ca    8.4      31 Jul 2020 08:16  Phos  2.1     07-31  Mg     1.6     07-31    TPro  5.6<L>  /  Alb  1.4<L>  /  TBili  0.6  /  DBili  x   /  AST  22  /  ALT  21  /  AlkPhos  216<H>  07-31    PT/INR - ( 31 Jul 2020 02:45 )   PT: 16.7 sec;   INR: 1.45 ratio             Culture Results:   >=3 organisms. Probable collection contamination. (07-30 @ 01:34)  Culture Results:   Growth in aerobic and anaerobic bottles: Staphylococcus epidermidis  "Due to technical problems, Proteus sp. will Not be reported as part of  the BCID panel until further notice"  ***Blood Panel PCR results on this specimen are available  approximately 3 hours after the Gram stain result.***  Gram stain, PCR, and/or culture results may not always  correspond due to difference in methodologies.  ************************************************************  This PCR assay was performed using [employer].  The following targets are tested for: Enterococcus,  vancomycin resistant enterococci, Listeria monocytogenes,  coagulase negative staphylococci, S. aureus,  methicillin resistant S. aureus, Streptococcus agalactiae  (Group B), S. pneumoniae, S. pyogenes (Group A),  Acinetobacter baumannii, Enterobacter cloacae, E. coli,  Klebsiella oxytoca, K. pneumoniae, Proteus sp.,  Serratia marcescens, Haemophilus influenzae,  Neisseria meningitidis, Pseudomonas aeruginosa, Candida  albicans, C. glabrata, C krusei, C parapsilosis,  C. tropicalis and the KPC resistance gene. (07-29 @ 23:02)  Culture Results:   Growth in aerobic and anaerobic bottles: Staphylococcus epidermidis  Susceptibility to follow.  "Due to technical problems, Proteus sp. will Not be reported as part of  the BCID panel until further notice"  ***Blood Panel PCR results on this specimen are available  approximately 3 hours after the Gram stain result.***  Gram stain, PCR, and/or culture results may not always  correspond due to difference in methodologies.  ************************************************************  This PCR assay was performed using [employer].  The following targets are tested for: Enterococcus,  vancomycin resistant enterococci, Listeria monocytogenes,  coagulase negative staphylococci, S. aureus,  methicillin resistant S. aureus, Streptococcus agalactiae  (Group B), S. pneumoniae, S. pyogenes (Group A),  Acinetobacter baumannii, Enterobacter cloacae, E. coli,  Klebsiella oxytoca, K. pneumoniae, Proteus sp.,  Serratia marcescens, Haemophilus influenzae,  Neisseria meningitidis, Pseudomonas aeruginosa, Candida  albicans, C. glabrata, C krusei, C parapsilosis,  C. tropicalis and the KPC resistance gene. (07-29 @ 23:02)      RADIOLOGY & ADDITIONAL STUDIES REVIEWED:  ***    GOALS OF CARE DISCUSSION WITH PATIENT/FAMILY/PROXY:    CRITICAL CARE TIME SPENT: 35 minutes

## 2020-08-01 NOTE — PROGRESS NOTE ADULT - SUBJECTIVE AND OBJECTIVE BOX
Patient is seen and examined at the bed side, is afebrile.  The WBC count stay elevated.      REVIEW OF SYSTEMS: All other review systems are negative        ALLERGIES: sulfADIAZINE (Angioedema)        ICU Vital Signs Last 24 Hrs  T(C): 36.7 (01 Aug 2020 18:32), Max: 36.7 (01 Aug 2020 18:32)  T(F): 98 (01 Aug 2020 18:32), Max: 98 (01 Aug 2020 18:32)  HR: 78 (01 Aug 2020 20:30) (58 - 134)  BP: 118/71 (01 Aug 2020 20:30) (93/66 - 149/80)  BP(mean): 82 (01 Aug 2020 20:30) (73 - 101)  ABP: --  ABP(mean): --  RR: 14 (01 Aug 2020 20:30) (2 - 31)  SpO2: 100% (01 Aug 2020 20:30) (96% - 100%)        PHYSICAL EXAM:  GENERAL: Not in distress   CHEST/LUNG:  Not using accessory muscles  HEART: s1 and s2 present  ABDOMEN:  Nontender and  Nondistended  EXTREMITIES: Left UE ACE bandage in placed, the swelling and tenderness is improving   CNS: Awake and Alert          LABS:                        8.5    14.60 )-----------( 291      ( 01 Aug 2020 06:04 )             25.8                           9.0    14.43 )-----------( 241      ( 2020 14:24 )             27.1                           8.2    9.11  )-----------( 124      ( 2020 06:31 )             24.2         08-    133<L>  |  95<L>  |  21<H>  ----------------------------<  86  4.0   |  29  |  4.56<H>    Ca    8.9      01 Aug 2020 06:04  Phos  3.8     08-  Mg     1.8     08-    TPro  5.5<L>  /  Alb  1.4<L>  /  TBili  0.5  /  DBili  x   /  AST  14  /  ALT  17  /  AlkPhos  209<H>  08-    07-31    134<L>  |  98  |  17  ----------------------------<  120<H>  3.7   |  29  |  3.84<H>    Ca    8.4      2020 08:16  Phos  2.1       Mg     1.6         TPro  5.6<L>  /  Alb  1.4<L>  /  TBili  0.6  /  DBili  x   /  AST  22  /  ALT  21  /  AlkPhos  216<H>      PT/INR - ( 2020 16:23 )   PT: 15.1 sec;   INR: 1.31 ratio      PTT - ( 2020 16:23 )  PTT:35.4 sec        CAPILLARY BLOOD GLUCOSE  POCT Blood Glucose.: 114 mg/dL (2020 18:11)  POCT Blood Glucose.: 111 mg/dL (2020 12:15)  POCT Blood Glucose.: 102 mg/dL (2020 10:21)  POCT Blood Glucose.: 77 mg/dL (2020 09:09)  POCT Blood Glucose.: 56 mg/dL (2020 09:07)        Urinalysis Basic - ( 2020 17:02 )  Color: Red / Appearance: bloody / S.015 / pH: x  Gluc: x / Ketone: Negative  / Bili: Small / Urobili: Negative   Blood: x / Protein: 100 / Nitrite: Negative   Leuk Esterase: Moderate / RBC: >50 /HPF / WBC 11-25 /HPF   Sq Epi: x / Non Sq Epi: Few /HPF / Bacteria: Moderate /HPF      Vancomycin Level, Trough (.20 @ 15:53)    Vancomycin Level, Trough: 18.2: Vancomycin trough levels should be rapidly reached and maintained at  15-20 ug/ml for life threatening MRSA  infections such as sepsis, endocarditis, osteomyelitis and pneumonia.     Vancomycin Level, Trough (.20 @ 08:16)    Vancomycin Level, Trough: 29.2: Vancomycin trough levels should be rapidly reached and maintained at  15-20 ug/ml for life threatening MRSA  infections such as sepsis, endocarditis, osteomyelitis and pneumonia.         MEDICATIONS  (STANDING):    chlorhexidine 2% Cloths 1 Application(s) Topical <User Schedule>  insulin lispro (HumaLOG) corrective regimen sliding scale   SubCutaneous Before meals and at bedtime  midodrine 10 milliGRAM(s) Oral every 8 hours  norepinephrine Infusion 0.1 MICROgram(s)/kG/Min (3.78 mL/Hr) IV Continuous <Continuous>  norepinephrine Infusion 0.05 MICROgram(s)/kG/Min (3.78 mL/Hr) IV Continuous <Continuous>  pantoprazole    Tablet 40 milliGRAM(s) Oral before breakfast  piperacillin/tazobactam IVPB.. 3.375 Gram(s) IV Intermittent every 12 hours          RADIOLOGY & ADDITIONAL TESTS:    20: US Duplex Hemodialysis Access (20 @ 20:44) >  impression: The left upper extremity AVG (likely brachial artery-graft-cephalic vein) is patent. There is narrowing of the graft lumen due to the presence of thrombus, about 40% narrowing. 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      20: Xray Chest 1 View-PORTABLE IMMEDIATE (20 @ 17:54) The cardiac silhouette is within normal limits. The lungs are clear. No pleural abnormality.          MICROBIOLOGY DATA:    Culture - Blood (20 @ 08:06)    Gram Stain:   Growth in aerobic bottle: Gram Positive Cocci in Clusters    Specimen Source: .Blood Blood-Peripheral    Culture Results:   Growth in aerobic bottle: Gram Positive Cocci in Clusters        Culture - Blood (20 @ 08:06)    Specimen Source: .Blood Blood-Peripheral    Culture Results:   No growth to date.      Culture - Surgical Swab (20 @ 06:41)    Specimen Source: .Surgical Swab left arm av graft C&S    Culture Results:   No growth      COVID-19  Antibody - for prior infection screening (20 @ 10:08)    COVID-19 IgG Antibody Index: 7.70: Abbott CMIA  Negative Result    <= 1.39 Index  Positive Result      >= 1.40 Index Index    COVID-19 IgG Antibody Interpretation: Positive: This test has not been reviewed by the FDA by the standard review  process. It has been authorized for emergency use by the FDA. [employer] has validated this test to be accurate.  Negative results do not rule out SARS-CoV-2 infection, particularly in  those who have been in recent contact with the virus. Follow-up testing  with a molecular diagnostic test should be considered to rule out  infection in these individuals.  Results from antibody testing should not be used as thesole basis to  diagnose or exclude SARS-CoV-2 infection, or to inform infection status.  Positive results may rarely be due to past or present infection with  non-SARS-CoV-2 coronavirus strains, such as coronavirus HKU1, NL63, OC43,  or 229E. [facility] [employer], through extensive validation  testing, has confirmed that this risk is minimal with this test.      Culture - Urine (20 @ 01:34)    Specimen Source: .Urine Clean Catch (Midstream)    Culture Results:   >=3 organisms. Probable collection contamination.      Culture - Blood (20 @ 23:02)    -  Coagulase negative Staphylococcus: Detec    Gram Stain:   Growth in anaerobic bottle: Gram Positive Cocci in Clusters  Growth in aerobic bottle: Gram Positive Cocci in Clusters    Specimen Source: .Blood Blood-Peripheral    Organism: Blood Culture PCR    Culture Results:   Growth in aerobic and anaerobic bottles: Staphylococcus epidermidis  Susceptibility to follow.  "Due to technical problems, Proteus sp. will Not be reported as part of  the BCID panel until further notice"  ***Blood Panel PCR results on this specimen are available  approximately 3 hours after the Gram stain result.***  Gram stain, PCR, and/or culture results may not always  correspond due to difference in methodologies.  ************************************************************  This PCR assay was performed using [employer].  The following targets are tested for: Enterococcus,  vancomycin resistant enterococci, Listeria monocytogenes,  coagulase negative staphylococci, S. aureus,  methicillin resistant S. aureus, Streptococcus agalactiae  (Group B), S. pneumoniae, S. pyogenes (Group A),  Acinetobacter baumannii, Enterobacter cloacae, E. coli,  Klebsiella oxytoca, K. pneumoniae, Proteus sp.,  Serratia marcescens, Haemophilus influenzae,  Neisseria meningitidis, Pseudomonas aeruginosa, Candida  albicans, C. glabrata, C krusei, C parapsilosis,  C. tropicalis and the KPC resistance gene.    Organism Identification: Blood Culture PCR    Method Type: PCR      Culture - Blood (20 @ 23:02)    Gram Stain:   Growth in anaerobic bottle: Gram Positive Cocci in Clusters  Growth in aerobic bottle: Gram Positive Cocci in Clusters    -  Coagulase negative Staphylococcus: Detec    Specimen Source: .Blood Blood-Peripheral    Organism: Blood Culture PCR    Culture Results:   Growth in aerobic and anaerobic bottles: Staphylococcus epidermidis  "Due to technical problems, Proteus sp. will Not be reported as part of  the BCID panel until further notice"  ***Blood Panel PCR results on this specimen are available  approximately 3 hours after the Gram stain result.***  Gram stain, PCR, and/or culture results may not always  correspond due to difference in methodologies.  ************************************************************  This PCR assay was performed using [employer].  The following targets are tested for: Enterococcus,  vancomycin resistant enterococci, Listeria monocytogenes,  coagulase negative staphylococci, S. aureus,  methicillin resistant S. aureus, Streptococcus agalactiae  (Group B), S. pneumoniae, S. pyogenes (Group A),  Acinetobacter baumannii, Enterobacter cloacae, E. coli,  Klebsiella oxytoca, K. pneumoniae, Proteus sp.,  Serratia marcescens, Haemophilus influenzae,  Neisseria meningitidis, Pseudomonas aeruginosa, Candida  albicans, C. glabrata, C krusei, C parapsilosis,  C. tropicalis and the KPC resistance gene.    Organism Identification: Blood Culture PCR    Method Type: PCR Patient is seen and examined at the bed side, is afebrile.  She is still on pressor.  The WBC count stay elevated. The repeat blood culture still positive.        REVIEW OF SYSTEMS: All other review systems are negative        ALLERGIES: sulfADIAZINE (Angioedema)        ICU Vital Signs Last 24 Hrs  T(C): 36.7 (01 Aug 2020 18:32), Max: 36.7 (01 Aug 2020 18:32)  T(F): 98 (01 Aug 2020 18:32), Max: 98 (01 Aug 2020 18:32)  HR: 78 (01 Aug 2020 20:30) (58 - 134)  BP: 118/71 (01 Aug 2020 20:30) (93/66 - 149/80)  BP(mean): 82 (01 Aug 2020 20:30) (73 - 101)  ABP: --  ABP(mean): --  RR: 14 (01 Aug 2020 20:30) (2 - 31)  SpO2: 100% (01 Aug 2020 20:30) (96% - 100%)        PHYSICAL EXAM:  GENERAL: Not in distress   CHEST/LUNG:  Not using accessory muscles  HEART: s1 and s2 present  ABDOMEN:  Nontender and  Nondistended  EXTREMITIES: Left UE ACE bandage in placed, the swelling and tenderness is improving   CNS: Awake and Alert          LABS:                        8.5    14.60 )-----------( 291      ( 01 Aug 2020 06:04 )             25.8                           9.0    14.43 )-----------( 241      ( 2020 14:24 )             27.1                           8.2    9.11  )-----------( 124      ( 2020 06:31 )             24.2         08    133<L>  |  95<L>  |  21<H>  ----------------------------<  86  4.0   |  29  |  4.56<H>    Ca    8.9      01 Aug 2020 06:04  Phos  3.8     08  Mg     1.8     08-    TPro  5.5<L>  /  Alb  1.4<L>  /  TBili  0.5  /  DBili  x   /  AST  14  /  ALT  17  /  AlkPhos  209<H>      134<L>  |  98  |  17  ----------------------------<  120<H>  3.7   |  29  |  3.84<H>    Ca    8.4      2020 08:16  Phos  2.1       Mg     1.6         TPro  5.6<L>  /  Alb  1.4<L>  /  TBili  0.6  /  DBili  x   /  AST  22  /  ALT  21  /  AlkPhos  216<H>      PT/INR - ( 2020 16:23 )   PT: 15.1 sec;   INR: 1.31 ratio      PTT - ( 2020 16:23 )  PTT:35.4 sec        CAPILLARY BLOOD GLUCOSE  POCT Blood Glucose.: 114 mg/dL (2020 18:11)  POCT Blood Glucose.: 111 mg/dL (2020 12:15)  POCT Blood Glucose.: 102 mg/dL (2020 10:21)  POCT Blood Glucose.: 77 mg/dL (2020 09:09)  POCT Blood Glucose.: 56 mg/dL (2020 09:07)        Urinalysis Basic - ( 2020 17:02 )  Color: Red / Appearance: bloody / S.015 / pH: x  Gluc: x / Ketone: Negative  / Bili: Small / Urobili: Negative   Blood: x / Protein: 100 / Nitrite: Negative   Leuk Esterase: Moderate / RBC: >50 /HPF / WBC 11-25 /HPF   Sq Epi: x / Non Sq Epi: Few /HPF / Bacteria: Moderate /HPF      Vancomycin Level, Trough (20 @ 15:53)    Vancomycin Level, Trough: 18.2: Vancomycin trough levels should be rapidly reached and maintained at  15-20 ug/ml for life threatening MRSA  infections such as sepsis, endocarditis, osteomyelitis and pneumonia.     Vancomycin Level, Trough (.20 @ 08:16)    Vancomycin Level, Trough: 29.2: Vancomycin trough levels should be rapidly reached and maintained at  15-20 ug/ml for life threatening MRSA  infections such as sepsis, endocarditis, osteomyelitis and pneumonia.         MEDICATIONS  (STANDING):    chlorhexidine 2% Cloths 1 Application(s) Topical <User Schedule>  insulin lispro (HumaLOG) corrective regimen sliding scale   SubCutaneous Before meals and at bedtime  midodrine 10 milliGRAM(s) Oral every 8 hours  norepinephrine Infusion 0.1 MICROgram(s)/kG/Min (3.78 mL/Hr) IV Continuous <Continuous>  norepinephrine Infusion 0.05 MICROgram(s)/kG/Min (3.78 mL/Hr) IV Continuous <Continuous>  pantoprazole    Tablet 40 milliGRAM(s) Oral before breakfast  piperacillin/tazobactam IVPB.. 3.375 Gram(s) IV Intermittent every 12 hours          RADIOLOGY & ADDITIONAL TESTS:      < from: Transthoracic Echocardiogram (20 @ 08:49) >  1. Normal mitral valve.  2. Normal trileaflet aortic valve. Mild aortic  regurgitation.  3. Aortic Root: 2.7 cm.  4. Normal left atrium.  LA volume index = 26 cc/m2.  5. Mild concentric left ventricular hypertrophy.  6. Hyperdynamic left ventricular systolic function (EF  >70%).  7. Grade I diastolic dysfunction (Impaired relaxation).  8. Normal right atrium.  9. Normal right ventricular size and systolic function  (TAPSE  2.1cm).  10. Unable to estimate RVSP.  11. Normal tricuspid valve.  12. Normal pulmonic valve.  13. Normal pericardium with no pericardial effusion.    < end of copied text >    20: US Duplex Hemodialysis Access (20 @ 20:44) >  impression: The left upper extremity AVG (likely brachial artery-graft-cephalic vein) is patent. There is narrowing of the graft lumen due to the presence of thrombus, about 40% narrowing. 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      20: Xray Chest 1 View-PORTABLE IMMEDIATE (20 @ 17:54) The cardiac silhouette is within normal limits. The lungs are clear. No pleural abnormality.          MICROBIOLOGY DATA:    Culture - Blood (20 @ 08:06)    Gram Stain:   Growth in aerobic bottle: Gram Positive Cocci in Clusters    Specimen Source: .Blood Blood-Peripheral    Culture Results:   Growth in aerobic bottle: Gram Positive Cocci in Clusters        Culture - Blood (20 @ 08:06)    Specimen Source: .Blood Blood-Peripheral    Culture Results:   No growth to date.      Culture - Surgical Swab (20 @ 06:41)    Specimen Source: .Surgical Swab left arm av graft C&S    Culture Results:   No growth      COVID-19  Antibody - for prior infection screening (20 @ 10:08)    COVID-19 IgG Antibody Index: 7.70: Abbott CMIA  Negative Result    <= 1.39 Index  Positive Result      >= 1.40 Index Index    COVID-19 IgG Antibody Interpretation: Positive: This test has not been reviewed by the FDA by the standard review  process. It has been authorized for emergency use by the FDA. [employer] has validated this test to be accurate.  Negative results do not rule out SARS-CoV-2 infection, particularly in  those who have been in recent contact with the virus. Follow-up testing  with a molecular diagnostic test should be considered to rule out  infection in these individuals.  Results from antibody testing should not be used as thesole basis to  diagnose or exclude SARS-CoV-2 infection, or to inform infection status.  Positive results may rarely be due to past or present infection with  non-SARS-CoV-2 coronavirus strains, such as coronavirus HKU1, NL63, OC43,  or 229E. [employer], through extensive validation  testing, has confirmed that this risk is minimal with this test.      Culture - Urine (20 @ 01:34)    Specimen Source: .Urine Clean Catch (Midstream)    Culture Results:   >=3 organisms. Probable collection contamination.      Culture - Blood (20 @ 23:02)    -  Coagulase negative Staphylococcus: Detec    Gram Stain:   Growth in anaerobic bottle: Gram Positive Cocci in Clusters  Growth in aerobic bottle: Gram Positive Cocci in Clusters    Specimen Source: .Blood Blood-Peripheral    Organism: Blood Culture PCR    Culture Results:   Growth in aerobic and anaerobic bottles: Staphylococcus epidermidis  Susceptibility to follow.  "Due to technical problems, Proteus sp. will Not be reported as part of  the BCID panel until further notice"  ***Blood Panel PCR results on this specimen are available  approximately 3 hours after the Gram stain result.***  Gram stain, PCR, and/or culture results may not always  correspond due to difference in methodologies.  ************************************************************  This PCR assay was performed using [employer].  The following targets are tested for: Enterococcus,  vancomycin resistant enterococci, Listeria monocytogenes,  coagulase negative staphylococci, S. aureus,  methicillin resistant S. aureus, Streptococcus agalactiae  (Group B), S. pneumoniae, S. pyogenes (Group A),  Acinetobacter baumannii, Enterobacter cloacae, E. coli,  Klebsiella oxytoca, K. pneumoniae, Proteus sp.,  Serratia marcescens, Haemophilus influenzae,  Neisseria meningitidis, Pseudomonas aeruginosa, Candida  albicans, C. glabrata, C krusei, C parapsilosis,  C. tropicalis and the KPC resistance gene.    Organism Identification: Blood Culture PCR    Method Type: PCR      Culture - Blood (20 @ 23:02)    Gram Stain:   Growth in anaerobic bottle: Gram Positive Cocci in Clusters  Growth in aerobic bottle: Gram Positive Cocci in Clusters    -  Coagulase negative Staphylococcus: Detec    Specimen Source: .Blood Blood-Peripheral    Organism: Blood Culture PCR    Culture Results:   Growth in aerobic and anaerobic bottles: Staphylococcus epidermidis  "Due to technical problems, Proteus sp. will Not be reported as part of  the BCID panel until further notice"  ***Blood Panel PCR results on this specimen are available  approximately 3 hours after the Gram stain result.***  Gram stain, PCR, and/or culture results may not always  correspond due to difference in methodologies.  ************************************************************  This PCR assay was performed using [employer].  The following targets are tested for: Enterococcus,  vancomycin resistant enterococci, Listeria monocytogenes,  coagulase negative staphylococci, S. aureus,  methicillin resistant S. aureus, Streptococcus agalactiae  (Group B), S. pneumoniae, S. pyogenes (Group A),  Acinetobacter baumannii, Enterobacter cloacae, E. coli,  Klebsiella oxytoca, K. pneumoniae, Proteus sp.,  Serratia marcescens, Haemophilus influenzae,  Neisseria meningitidis, Pseudomonas aeruginosa, Candida  albicans, C. glabrata, C krusei, C parapsilosis,  C. tropicalis and the KPC resistance gene.    Organism Identification: Blood Culture PCR    Method Type: PCR

## 2020-08-01 NOTE — PROGRESS NOTE ADULT - SUBJECTIVE AND OBJECTIVE BOX
Interval Events:    tolerating po intake  poc glucose controlled  no overnight hypoglycemia    Allergies    sulfADIAZINE (Angioedema)    Intolerances      Endocrine/Metabolic Medications:  glucagon  Injectable 1 milliGRAM(s) IntraMuscular once PRN  insulin lispro (HumaLOG) corrective regimen sliding scale   SubCutaneous Before meals and at bedtime      Vital Signs Last 24 Hrs  T(C): 36.4 (01 Aug 2020 04:15), Max: 36.4 (01 Aug 2020 04:15)  T(F): 97.5 (01 Aug 2020 04:15), Max: 97.5 (01 Aug 2020 04:15)  HR: 67 (01 Aug 2020 08:00) (55 - 134)  BP: 114/81 (01 Aug 2020 08:00) (75/59 - 194/90)  BP(mean): 89 (01 Aug 2020 08:00) (60 - 116)  RR: 14 (01 Aug 2020 08:00) (0 - 25)  SpO2: 100% (01 Aug 2020 08:00) (56% - 100%)      PHYSICAL EXAM    Constitutional:    NC/AT:    HEENT:    Neck:  No JVD  Respiratory:  reduced breath sounds b/l bases    Cardiovascular:  RR   Gastrointestinal: Soft     Extremities: without cyanosis      Neurological:  non focal    LABS                        8.5    14.60 )-----------( 291      ( 01 Aug 2020 06:04 )             25.8                               133    |  95     |  21                  Calcium: 8.9   / iCa: x      (08-01 @ 06:04)    ----------------------------<  86        Magnesium: 1.8                              4.0     |  29     |  4.56             Phosphorous: 3.8      TPro  5.5    /  Alb  1.4    /  TBili  0.5    /  DBili  x      /  AST  14     /  ALT  17     /  AlkPhos  209    01 Aug 2020 06:04    CAPILLARY BLOOD GLUCOSE      POCT Blood Glucose.: 76 mg/dL (01 Aug 2020 04:45)  POCT Blood Glucose.: 106 mg/dL (31 Jul 2020 23:05)  POCT Blood Glucose.: 93 mg/dL (31 Jul 2020 16:35)  POCT Blood Glucose.: 104 mg/dL (31 Jul 2020 11:12)        Assesment/plan        68yo female with multiple comorbidities including h/o HTN, DM type 2, ESRD on HD admitted with AV fistula swelling and pain    Endocrinology consulted for glycemic management    DM type 2  complicated by ESRD on HD, sepsis- bacteremia, requiring pressor current admit  NH regimen:  humalog sliding scale    recommendations:  fasting , prandial controlled  - low dose sliding scale  - reassess based on requirements  - goal inpatient glucose range: 140-180mg/dl    sepsis  bacteremia  on vancomycin  s/p AV repair/ligation  ID on board  requiring pressor    ESRD on HD  cautious insulin dosing  may need modified sliding scale    Discussed with primary team  Please call - Endocrine- Dr Mónica Cm as needed    Time spent evaluating patient including coordination of care- 35mins.

## 2020-08-01 NOTE — PROGRESS NOTE ADULT - ASSESSMENT
Patient is a 69y old  Female from [facility], with PMH of HTN, DM, Hyperphosphatemia,  Oral cancer(s/p excision), ESRD on HD via AVF  which created 3-4 years ago s/p exploration of AVF, interposition graft 6/2017, send in  to the ER for evaluation of  left arm AV fistula pain and swelling x1 week. Last week when she went for her HD session and developed gradual onset of swelling and pain at the AVF site on L arm. As per the patient the HD nurse had inserted the needle incorrectly that caused the swelling and severe pain. The AVF was not occluded and she had her HD sessions as per the schedule Tu/Th/Sat with her last HD session being yesterday.  On admission, she has no fever or Leukocytosis, but admission blood cultures growing GPC in clusters. She has started on Zosyn and Vancomycin and the ID consult requested to assist with further evaluation and antibiotic management.      # Bacteremia- 7/29/20- Coagulase Negative Staph. - ? source AVG  # Infected AVG with surrounding cellulitis - s/p ligation of Left  AVG pseudoaneurysm 7/30  # Septic shock- Post-op 7/30- transferred to ICU since requiring pressor    would recommend:    1. Repeat Blood cultures X 2 in  AM to document clearing the blood stream  2. Dose Vancomycin based on level and goal level between 15 to 20  3. Follow up final blood cultures for sensitivity of CoNS  4. Monitor WBC count   5. TTE     d/w ICU team    Attending Attestation:    Spent more than 45 minutes on total encounter, more than 50 % of the visit was spent counseling and/or coordinating care by the Attending physician. Patient is a 69y old  Female from [facility], with PMH of HTN, DM, Hyperphosphatemia,  Oral cancer(s/p excision), ESRD on HD via AVF  which created 3-4 years ago s/p exploration of AVF, interposition graft 6/2017, send in  to the ER for evaluation of  left arm AV fistula pain and swelling x1 week. Last week when she went for her HD session and developed gradual onset of swelling and pain at the AVF site on L arm. As per the patient the HD nurse had inserted the needle incorrectly that caused the swelling and severe pain. The AVF was not occluded and she had her HD sessions as per the schedule Tu/Th/Sat with her last HD session being yesterday.  On admission, she has no fever or Leukocytosis, but admission blood cultures growing GPC in clusters. She has started on Zosyn and Vancomycin and the ID consult requested to assist with further evaluation and antibiotic management.      # Bacteremia- 7/29/20- Coagulase Negative Staph. - ? source AVG - Repeat Blood Cx from 7/31 still positive- TTE as of 7/31 is negative for vegetation   # Infected AVG with surrounding cellulitis - s/p ligation of Left  AVG pseudoaneurysm 7/30  # Septic shock- Post-op 7/30- transferred to ICU since requiring pressor    would recommend:    1. Repeat Blood cultures X 2 in  AM to document clearing the blood stream  2. Dose Vancomycin based on level and goal level between 15 to 20  3. Follow up final blood cultures for sensitivity of CoNS  4. Monitor WBC count   5. Wound care as per Vascular team  6. Wean off pressor as tolerated     d/w ICU team    Attending Attestation:    Spent more than 45 minutes on total encounter, more than 50 % of the visit was spent counseling and/or coordinating care by the Attending physician.

## 2020-08-02 LAB
ALBUMIN SERPL ELPH-MCNC: 1.5 G/DL — LOW (ref 3.5–5)
ALP SERPL-CCNC: 173 U/L — HIGH (ref 40–120)
ALT FLD-CCNC: 13 U/L DA — SIGNIFICANT CHANGE UP (ref 10–60)
ANION GAP SERPL CALC-SCNC: 7 MMOL/L — SIGNIFICANT CHANGE UP (ref 5–17)
ANION GAP SERPL CALC-SCNC: 7 MMOL/L — SIGNIFICANT CHANGE UP (ref 5–17)
ANISOCYTOSIS BLD QL: SIGNIFICANT CHANGE UP
AST SERPL-CCNC: 15 U/L — SIGNIFICANT CHANGE UP (ref 10–40)
BASOPHILS # BLD AUTO: 0.05 K/UL — SIGNIFICANT CHANGE UP (ref 0–0.2)
BASOPHILS NFR BLD AUTO: 0.4 % — SIGNIFICANT CHANGE UP (ref 0–2)
BILIRUB SERPL-MCNC: 0.5 MG/DL — SIGNIFICANT CHANGE UP (ref 0.2–1.2)
BUN SERPL-MCNC: 10 MG/DL — SIGNIFICANT CHANGE UP (ref 7–18)
BUN SERPL-MCNC: 11 MG/DL — SIGNIFICANT CHANGE UP (ref 7–18)
CALCIUM SERPL-MCNC: 8.3 MG/DL — LOW (ref 8.4–10.5)
CALCIUM SERPL-MCNC: 8.4 MG/DL — SIGNIFICANT CHANGE UP (ref 8.4–10.5)
CHLORIDE SERPL-SCNC: 97 MMOL/L — SIGNIFICANT CHANGE UP (ref 96–108)
CHLORIDE SERPL-SCNC: 97 MMOL/L — SIGNIFICANT CHANGE UP (ref 96–108)
CO2 SERPL-SCNC: 28 MMOL/L — SIGNIFICANT CHANGE UP (ref 22–31)
CO2 SERPL-SCNC: 29 MMOL/L — SIGNIFICANT CHANGE UP (ref 22–31)
CREAT SERPL-MCNC: 2.75 MG/DL — HIGH (ref 0.5–1.3)
CREAT SERPL-MCNC: 3.13 MG/DL — HIGH (ref 0.5–1.3)
CULTURE RESULTS: SIGNIFICANT CHANGE UP
EOSINOPHIL # BLD AUTO: 0.1 K/UL — SIGNIFICANT CHANGE UP (ref 0–0.5)
EOSINOPHIL NFR BLD AUTO: 0.8 % — SIGNIFICANT CHANGE UP (ref 0–6)
GLUCOSE BLDC GLUCOMTR-MCNC: 108 MG/DL — HIGH (ref 70–99)
GLUCOSE BLDC GLUCOMTR-MCNC: 136 MG/DL — HIGH (ref 70–99)
GLUCOSE BLDC GLUCOMTR-MCNC: 201 MG/DL — HIGH (ref 70–99)
GLUCOSE BLDC GLUCOMTR-MCNC: 73 MG/DL — SIGNIFICANT CHANGE UP (ref 70–99)
GLUCOSE BLDC GLUCOMTR-MCNC: 82 MG/DL — SIGNIFICANT CHANGE UP (ref 70–99)
GLUCOSE SERPL-MCNC: 103 MG/DL — HIGH (ref 70–99)
GLUCOSE SERPL-MCNC: 62 MG/DL — LOW (ref 70–99)
GRAM STN FLD: SIGNIFICANT CHANGE UP
GRAM STN FLD: SIGNIFICANT CHANGE UP
HBV SURFACE AG SER-ACNC: SIGNIFICANT CHANGE UP
HCT VFR BLD CALC: 19.6 % — CRITICAL LOW (ref 34.5–45)
HCT VFR BLD CALC: 20.1 % — CRITICAL LOW (ref 34.5–45)
HCT VFR BLD CALC: 27.3 % — LOW (ref 34.5–45)
HCT VFR BLD CALC: 28.5 % — LOW (ref 34.5–45)
HGB BLD-MCNC: 6.5 G/DL — CRITICAL LOW (ref 11.5–15.5)
HGB BLD-MCNC: 6.6 G/DL — CRITICAL LOW (ref 11.5–15.5)
HGB BLD-MCNC: 9.3 G/DL — LOW (ref 11.5–15.5)
HGB BLD-MCNC: 9.7 G/DL — LOW (ref 11.5–15.5)
HYPOCHROMIA BLD QL: SLIGHT — SIGNIFICANT CHANGE UP
IMM GRANULOCYTES NFR BLD AUTO: 0.5 % — SIGNIFICANT CHANGE UP (ref 0–1.5)
LYMPHOCYTES # BLD AUTO: 1.93 K/UL — SIGNIFICANT CHANGE UP (ref 1–3.3)
LYMPHOCYTES # BLD AUTO: 15.4 % — SIGNIFICANT CHANGE UP (ref 13–44)
MACROCYTES BLD QL: SLIGHT — SIGNIFICANT CHANGE UP
MAGNESIUM SERPL-MCNC: 1.6 MG/DL — SIGNIFICANT CHANGE UP (ref 1.6–2.6)
MAGNESIUM SERPL-MCNC: 1.7 MG/DL — SIGNIFICANT CHANGE UP (ref 1.6–2.6)
MANUAL SMEAR VERIFICATION: SIGNIFICANT CHANGE UP
MCHC RBC-ENTMCNC: 29.7 PG — SIGNIFICANT CHANGE UP (ref 27–34)
MCHC RBC-ENTMCNC: 30 PG — SIGNIFICANT CHANGE UP (ref 27–34)
MCHC RBC-ENTMCNC: 30.1 PG — SIGNIFICANT CHANGE UP (ref 27–34)
MCHC RBC-ENTMCNC: 30.3 PG — SIGNIFICANT CHANGE UP (ref 27–34)
MCHC RBC-ENTMCNC: 32.8 GM/DL — SIGNIFICANT CHANGE UP (ref 32–36)
MCHC RBC-ENTMCNC: 33.2 GM/DL — SIGNIFICANT CHANGE UP (ref 32–36)
MCHC RBC-ENTMCNC: 34 GM/DL — SIGNIFICANT CHANGE UP (ref 32–36)
MCHC RBC-ENTMCNC: 34.1 GM/DL — SIGNIFICANT CHANGE UP (ref 32–36)
MCV RBC AUTO: 88.3 FL — SIGNIFICANT CHANGE UP (ref 80–100)
MCV RBC AUTO: 89.1 FL — SIGNIFICANT CHANGE UP (ref 80–100)
MCV RBC AUTO: 90.3 FL — SIGNIFICANT CHANGE UP (ref 80–100)
MCV RBC AUTO: 90.5 FL — SIGNIFICANT CHANGE UP (ref 80–100)
MICROCYTES BLD QL: SLIGHT — SIGNIFICANT CHANGE UP
MONOCYTES # BLD AUTO: 0.96 K/UL — HIGH (ref 0–0.9)
MONOCYTES NFR BLD AUTO: 7.7 % — SIGNIFICANT CHANGE UP (ref 2–14)
NEUTROPHILS # BLD AUTO: 9.44 K/UL — HIGH (ref 1.8–7.4)
NEUTROPHILS NFR BLD AUTO: 75.2 % — SIGNIFICANT CHANGE UP (ref 43–77)
NRBC # BLD: 0 /100 WBCS — SIGNIFICANT CHANGE UP (ref 0–0)
PHOSPHATE SERPL-MCNC: 2 MG/DL — LOW (ref 2.5–4.5)
PHOSPHATE SERPL-MCNC: 2.1 MG/DL — LOW (ref 2.5–4.5)
PLAT MORPH BLD: NORMAL — SIGNIFICANT CHANGE UP
PLATELET # BLD AUTO: 247 K/UL — SIGNIFICANT CHANGE UP (ref 150–400)
PLATELET # BLD AUTO: 266 K/UL — SIGNIFICANT CHANGE UP (ref 150–400)
PLATELET # BLD AUTO: 281 K/UL — SIGNIFICANT CHANGE UP (ref 150–400)
PLATELET # BLD AUTO: 291 K/UL — SIGNIFICANT CHANGE UP (ref 150–400)
PLATELET COUNT - ESTIMATE: NORMAL — SIGNIFICANT CHANGE UP
POIKILOCYTOSIS BLD QL AUTO: SLIGHT — SIGNIFICANT CHANGE UP
POLYCHROMASIA BLD QL SMEAR: SLIGHT — SIGNIFICANT CHANGE UP
POTASSIUM SERPL-MCNC: 3.4 MMOL/L — LOW (ref 3.5–5.3)
POTASSIUM SERPL-MCNC: 3.6 MMOL/L — SIGNIFICANT CHANGE UP (ref 3.5–5.3)
POTASSIUM SERPL-SCNC: 3.4 MMOL/L — LOW (ref 3.5–5.3)
POTASSIUM SERPL-SCNC: 3.6 MMOL/L — SIGNIFICANT CHANGE UP (ref 3.5–5.3)
PROT SERPL-MCNC: 4.8 G/DL — LOW (ref 6–8.3)
RBC # BLD: 2.17 M/UL — LOW (ref 3.8–5.2)
RBC # BLD: 2.22 M/UL — LOW (ref 3.8–5.2)
RBC # BLD: 3.09 M/UL — LOW (ref 3.8–5.2)
RBC # BLD: 3.2 M/UL — LOW (ref 3.8–5.2)
RBC # FLD: 17.2 % — HIGH (ref 10.3–14.5)
RBC # FLD: 17.3 % — HIGH (ref 10.3–14.5)
RBC # FLD: 19.2 % — HIGH (ref 10.3–14.5)
RBC # FLD: 19.4 % — HIGH (ref 10.3–14.5)
RBC BLD AUTO: ABNORMAL
SODIUM SERPL-SCNC: 132 MMOL/L — LOW (ref 135–145)
SODIUM SERPL-SCNC: 133 MMOL/L — LOW (ref 135–145)
SPECIMEN SOURCE: SIGNIFICANT CHANGE UP
WBC # BLD: 10.57 K/UL — HIGH (ref 3.8–10.5)
WBC # BLD: 10.69 K/UL — HIGH (ref 3.8–10.5)
WBC # BLD: 11.73 K/UL — HIGH (ref 3.8–10.5)
WBC # BLD: 12.54 K/UL — HIGH (ref 3.8–10.5)
WBC # FLD AUTO: 10.57 K/UL — HIGH (ref 3.8–10.5)
WBC # FLD AUTO: 10.69 K/UL — HIGH (ref 3.8–10.5)
WBC # FLD AUTO: 11.73 K/UL — HIGH (ref 3.8–10.5)
WBC # FLD AUTO: 12.54 K/UL — HIGH (ref 3.8–10.5)

## 2020-08-02 PROCEDURE — 70491 CT SOFT TISSUE NECK W/DYE: CPT | Mod: 26

## 2020-08-02 PROCEDURE — 74176 CT ABD & PELVIS W/O CONTRAST: CPT | Mod: 26

## 2020-08-02 RX ORDER — DEXTROSE 50 % IN WATER 50 %
50 SYRINGE (ML) INTRAVENOUS ONCE
Refills: 0 | Status: COMPLETED | OUTPATIENT
Start: 2020-08-02 | End: 2020-08-02

## 2020-08-02 RX ORDER — ERYTHROPOIETIN 10000 [IU]/ML
4000 INJECTION, SOLUTION INTRAVENOUS; SUBCUTANEOUS
Refills: 0 | Status: DISCONTINUED | OUTPATIENT
Start: 2020-08-02 | End: 2020-08-07

## 2020-08-02 RX ORDER — POTASSIUM CHLORIDE 20 MEQ
20 PACKET (EA) ORAL
Refills: 0 | Status: DISCONTINUED | OUTPATIENT
Start: 2020-08-02 | End: 2020-08-02

## 2020-08-02 RX ADMIN — PIPERACILLIN AND TAZOBACTAM 25 GRAM(S): 4; .5 INJECTION, POWDER, LYOPHILIZED, FOR SOLUTION INTRAVENOUS at 05:51

## 2020-08-02 RX ADMIN — MIDODRINE HYDROCHLORIDE 10 MILLIGRAM(S): 2.5 TABLET ORAL at 06:24

## 2020-08-02 RX ADMIN — HYDROMORPHONE HYDROCHLORIDE 1 MILLIGRAM(S): 2 INJECTION INTRAMUSCULAR; INTRAVENOUS; SUBCUTANEOUS at 08:45

## 2020-08-02 RX ADMIN — Medication 50 MILLILITER(S): at 05:52

## 2020-08-02 RX ADMIN — HYDROMORPHONE HYDROCHLORIDE 1 MILLIGRAM(S): 2 INJECTION INTRAMUSCULAR; INTRAVENOUS; SUBCUTANEOUS at 08:30

## 2020-08-02 RX ADMIN — PIPERACILLIN AND TAZOBACTAM 25 GRAM(S): 4; .5 INJECTION, POWDER, LYOPHILIZED, FOR SOLUTION INTRAVENOUS at 17:22

## 2020-08-02 RX ADMIN — CHLORHEXIDINE GLUCONATE 1 APPLICATION(S): 213 SOLUTION TOPICAL at 05:51

## 2020-08-02 RX ADMIN — PANTOPRAZOLE SODIUM 40 MILLIGRAM(S): 20 TABLET, DELAYED RELEASE ORAL at 06:24

## 2020-08-02 RX ADMIN — Medication 2: at 06:34

## 2020-08-02 NOTE — PROGRESS NOTE ADULT - SUBJECTIVE AND OBJECTIVE BOX
INTERVAL HPI/OVERNIGHT EVENTS: ***    PRESSORS: [ ] YES [ ] NO  WHICH:    ANTIBIOTICS:                  DATE STARTED:  ANTIBIOTICS:                  DATE STARTED:  ANTIBIOTICS:                  DATE STARTED:    Antimicrobial:  piperacillin/tazobactam IVPB.. 3.375 Gram(s) IV Intermittent every 12 hours    Cardiovascular:  midodrine 10 milliGRAM(s) Oral every 8 hours  norepinephrine Infusion 0.1 MICROgram(s)/kG/Min IV Continuous <Continuous>    Pulmonary:    Hematalogic:    Other:  chlorhexidine 2% Cloths 1 Application(s) Topical <User Schedule>  glucagon  Injectable 1 milliGRAM(s) IntraMuscular once PRN  HYDROmorphone  Injectable 1 milliGRAM(s) IV Push every 6 hours PRN  insulin lispro (HumaLOG) corrective regimen sliding scale   SubCutaneous Before meals and at bedtime  pantoprazole    Tablet 40 milliGRAM(s) Oral before breakfast    chlorhexidine 2% Cloths 1 Application(s) Topical <User Schedule>  glucagon  Injectable 1 milliGRAM(s) IntraMuscular once PRN  HYDROmorphone  Injectable 1 milliGRAM(s) IV Push every 6 hours PRN  insulin lispro (HumaLOG) corrective regimen sliding scale   SubCutaneous Before meals and at bedtime  midodrine 10 milliGRAM(s) Oral every 8 hours  norepinephrine Infusion 0.1 MICROgram(s)/kG/Min IV Continuous <Continuous>  pantoprazole    Tablet 40 milliGRAM(s) Oral before breakfast  piperacillin/tazobactam IVPB.. 3.375 Gram(s) IV Intermittent every 12 hours    Drug Dosing Weight  Height (cm): 157.48 (29 Jul 2020 14:57)  Weight (kg): 40.3 (31 Jul 2020 01:35)  BMI (kg/m2): 16.3 (31 Jul 2020 01:35)  BSA (m2): 1.35 (31 Jul 2020 01:35)    CENTRAL LINE: [ ] YES [ ] NO  LOCATION:         CANALES: [ ] YES [ ] NO          A-LINE:  [ ] YES [ ] NO  LOCATION:             ICU Vital Signs Last 24 Hrs  T(C): 36.7 (01 Aug 2020 20:30), Max: 36.7 (01 Aug 2020 18:32)  T(F): 98 (01 Aug 2020 20:30), Max: 98 (01 Aug 2020 18:32)  HR: 68 (02 Aug 2020 00:00) (58 - 134)  BP: 122/73 (02 Aug 2020 00:00) (82/58 - 208/81)  BP(mean): 84 (02 Aug 2020 00:00) (62 - 115)  ABP: --  ABP(mean): --  RR: 13 (02 Aug 2020 00:00) (9 - 31)  SpO2: 100% (02 Aug 2020 00:00) (96% - 100%)            07-31 @ 07:01  -  08-01 @ 07:00  --------------------------------------------------------  IN: 93.3 mL / OUT: 0 mL / NET: 93.3 mL              PHYSICAL EXAM:    GENERAL: NAD  EYES: EOMI, PERRLA,   NECK: Supple, No JVD; Normal thyroid; Trachea midline  NERVOUS SYSTEM:  Alert & Oriented X3,  Motor Strength 5/5 B/L upper and lower extremities; DTRs 2+ intact and symmetric  CHEST/LUNG: No rales, rhonchi, wheezing   HEART: Regular rate and rhythm; No murmurs,   ABDOMEN: Soft, Nontender, Nondistended; Bowel sounds present  EXTREMITIES:  2+ Peripheral Pulses, No clubbing, cyanosis, or edema        LABS:  CBC Full  -  ( 01 Aug 2020 06:04 )  WBC Count : 14.60 K/uL  RBC Count : 2.86 M/uL  Hemoglobin : 8.5 g/dL  Hematocrit : 25.8 %  Platelet Count - Automated : 291 K/uL  Mean Cell Volume : 90.2 fl  Mean Cell Hemoglobin : 29.7 pg  Mean Cell Hemoglobin Concentration : 32.9 gm/dL  Auto Neutrophil # : 11.07 K/uL  Auto Lymphocyte # : 1.99 K/uL  Auto Monocyte # : 1.29 K/uL  Auto Eosinophil # : 0.10 K/uL  Auto Basophil # : 0.08 K/uL  Auto Neutrophil % : 75.9 %  Auto Lymphocyte % : 13.6 %  Auto Monocyte % : 8.8 %  Auto Eosinophil % : 0.7 %  Auto Basophil % : 0.5 %    08-01    133<L>  |  95<L>  |  21<H>  ----------------------------<  86  4.0   |  29  |  4.56<H>    Ca    8.9      01 Aug 2020 06:04  Phos  3.8     08-01  Mg     1.8     08-01    TPro  5.5<L>  /  Alb  1.4<L>  /  TBili  0.5  /  DBili  x   /  AST  14  /  ALT  17  /  AlkPhos  209<H>  08-01    PT/INR - ( 31 Jul 2020 02:45 )   PT: 16.7 sec;   INR: 1.45 ratio             Culture Results:   Growth in aerobic bottle: Gram Positive Cocci in Clusters (07-31 @ 08:06)  Culture Results:   Growth in aerobic bottle: Gram Positive Cocci in Clusters (07-31 @ 08:06)  Culture Results:   No growth (07-31 @ 06:41)  Culture Results:   >=3 organisms. Probable collection contamination. (07-30 @ 01:34)      RADIOLOGY & ADDITIONAL STUDIES REVIEWED:  ***    [ ]GOALS OF CARE DISCUSSION WITH PATIENT/FAMILY/PROXY:    CRITICAL CARE TIME SPENT: 35 minutes    Assessment       #CNS     #CV    #PULM    #GI    #    #MUSCULOSKELETAL    #SKIN     #ID    #ENDO    #GOC    #PROPHYLAXIS INTERVAL HPI/OVERNIGHT EVENTS: no overnight events     PRESSORS: [ x] YES [ ] NO  WHICH: levophed       Antimicrobial:  piperacillin/tazobactam IVPB.. 3.375 Gram(s) IV Intermittent every 12 hours    Cardiovascular:  midodrine 10 milliGRAM(s) Oral every 8 hours  norepinephrine Infusion 0.1 MICROgram(s)/kG/Min IV Continuous <Continuous>    Pulmonary:    Hematalogic:    Other:  chlorhexidine 2% Cloths 1 Application(s) Topical <User Schedule>  glucagon  Injectable 1 milliGRAM(s) IntraMuscular once PRN  HYDROmorphone  Injectable 1 milliGRAM(s) IV Push every 6 hours PRN  insulin lispro (HumaLOG) corrective regimen sliding scale   SubCutaneous Before meals and at bedtime  pantoprazole    Tablet 40 milliGRAM(s) Oral before breakfast    chlorhexidine 2% Cloths 1 Application(s) Topical <User Schedule>  glucagon  Injectable 1 milliGRAM(s) IntraMuscular once PRN  HYDROmorphone  Injectable 1 milliGRAM(s) IV Push every 6 hours PRN  insulin lispro (HumaLOG) corrective regimen sliding scale   SubCutaneous Before meals and at bedtime  midodrine 10 milliGRAM(s) Oral every 8 hours  norepinephrine Infusion 0.1 MICROgram(s)/kG/Min IV Continuous <Continuous>  pantoprazole    Tablet 40 milliGRAM(s) Oral before breakfast  piperacillin/tazobactam IVPB.. 3.375 Gram(s) IV Intermittent every 12 hours    Drug Dosing Weight  Height (cm): 157.48 (29 Jul 2020 14:57)  Weight (kg): 40.3 (31 Jul 2020 01:35)  BMI (kg/m2): 16.3 (31 Jul 2020 01:35)  BSA (m2): 1.35 (31 Jul 2020 01:35)    CENTRAL LINE: [ ] YES [ ] NO  LOCATION:         CANALES: [ ] YES [ ] NO          A-LINE:  [ ] YES [ ] NO  LOCATION:             ICU Vital Signs Last 24 Hrs  T(C): 36.7 (01 Aug 2020 20:30), Max: 36.7 (01 Aug 2020 18:32)  T(F): 98 (01 Aug 2020 20:30), Max: 98 (01 Aug 2020 18:32)  HR: 68 (02 Aug 2020 00:00) (58 - 134)  BP: 122/73 (02 Aug 2020 00:00) (82/58 - 208/81)  BP(mean): 84 (02 Aug 2020 00:00) (62 - 115)  ABP: --  ABP(mean): --  RR: 13 (02 Aug 2020 00:00) (9 - 31)  SpO2: 100% (02 Aug 2020 00:00) (96% - 100%)            07-31 @ 07:01  -  08-01 @ 07:00  --------------------------------------------------------  IN: 93.3 mL / OUT: 0 mL / NET: 93.3 mL        PHYSICAL EXAM:    GENERAL: NAD, cachectic   EYES: EOMI, PERRLA,   NECK: Supple, No JVD; Normal thyroid; Trachea midline  NERVOUS SYSTEM:  Alert & Oriented X3,  Motor Strength 5/5 B/L upper and lower extremities; DTRs 2+ intact and symmetric, left arm with clean dressing   CHEST/LUNG: No rales, rhonchi, wheezing   HEART: Regular rate and rhythm; No murmurs,   ABDOMEN: Soft, Nontender, Nondistended; Bowel sounds present  EXTREMITIES:  2+ Peripheral Pulses, No clubbing, cyanosis, or edema        LABS:  CBC Full  -  ( 01 Aug 2020 06:04 )  WBC Count : 14.60 K/uL  RBC Count : 2.86 M/uL  Hemoglobin : 8.5 g/dL  Hematocrit : 25.8 %  Platelet Count - Automated : 291 K/uL  Mean Cell Volume : 90.2 fl  Mean Cell Hemoglobin : 29.7 pg  Mean Cell Hemoglobin Concentration : 32.9 gm/dL  Auto Neutrophil # : 11.07 K/uL  Auto Lymphocyte # : 1.99 K/uL  Auto Monocyte # : 1.29 K/uL  Auto Eosinophil # : 0.10 K/uL  Auto Basophil # : 0.08 K/uL  Auto Neutrophil % : 75.9 %  Auto Lymphocyte % : 13.6 %  Auto Monocyte % : 8.8 %  Auto Eosinophil % : 0.7 %  Auto Basophil % : 0.5 %    08-01    133<L>  |  95<L>  |  21<H>  ----------------------------<  86  4.0   |  29  |  4.56<H>    Ca    8.9      01 Aug 2020 06:04  Phos  3.8     08-01  Mg     1.8     08-01    TPro  5.5<L>  /  Alb  1.4<L>  /  TBili  0.5  /  DBili  x   /  AST  14  /  ALT  17  /  AlkPhos  209<H>  08-01    PT/INR - ( 31 Jul 2020 02:45 )   PT: 16.7 sec;   INR: 1.45 ratio             Culture Results:   Growth in aerobic bottle: Gram Positive Cocci in Clusters (07-31 @ 08:06)  Culture Results:   Growth in aerobic bottle: Gram Positive Cocci in Clusters (07-31 @ 08:06)  Culture Results:   No growth (07-31 @ 06:41)  Culture Results:   >=3 organisms. Probable collection contamination. (07-30 @ 01:34)      RADIOLOGY & ADDITIONAL STUDIES REVIEWED:  ***    [ ]GOALS OF CARE DISCUSSION WITH PATIENT/FAMILY/PROXY:    CRITICAL CARE TIME SPENT: 35 minutes INTERVAL HPI/OVERNIGHT EVENTS: patient was hypoglycemic overnight, asymptomatic, D50 given.     PRESSORS: [ x] YES [ ] NO  WHICH: levophed       Antimicrobial:  piperacillin/tazobactam IVPB.. 3.375 Gram(s) IV Intermittent every 12 hours    Cardiovascular:  midodrine 10 milliGRAM(s) Oral every 8 hours  norepinephrine Infusion 0.1 MICROgram(s)/kG/Min IV Continuous <Continuous>    Pulmonary:    Hematalogic:    Other:  chlorhexidine 2% Cloths 1 Application(s) Topical <User Schedule>  glucagon  Injectable 1 milliGRAM(s) IntraMuscular once PRN  HYDROmorphone  Injectable 1 milliGRAM(s) IV Push every 6 hours PRN  insulin lispro (HumaLOG) corrective regimen sliding scale   SubCutaneous Before meals and at bedtime  pantoprazole    Tablet 40 milliGRAM(s) Oral before breakfast    chlorhexidine 2% Cloths 1 Application(s) Topical <User Schedule>  glucagon  Injectable 1 milliGRAM(s) IntraMuscular once PRN  HYDROmorphone  Injectable 1 milliGRAM(s) IV Push every 6 hours PRN  insulin lispro (HumaLOG) corrective regimen sliding scale   SubCutaneous Before meals and at bedtime  midodrine 10 milliGRAM(s) Oral every 8 hours  norepinephrine Infusion 0.1 MICROgram(s)/kG/Min IV Continuous <Continuous>  pantoprazole    Tablet 40 milliGRAM(s) Oral before breakfast  piperacillin/tazobactam IVPB.. 3.375 Gram(s) IV Intermittent every 12 hours    Drug Dosing Weight  Height (cm): 157.48 (29 Jul 2020 14:57)  Weight (kg): 40.3 (31 Jul 2020 01:35)  BMI (kg/m2): 16.3 (31 Jul 2020 01:35)  BSA (m2): 1.35 (31 Jul 2020 01:35)    CENTRAL LINE: [ ] YES [ ] NO  LOCATION:         CANALES: [ ] YES [ ] NO          A-LINE:  [ ] YES [ ] NO  LOCATION:             ICU Vital Signs Last 24 Hrs  T(C): 36.7 (01 Aug 2020 20:30), Max: 36.7 (01 Aug 2020 18:32)  T(F): 98 (01 Aug 2020 20:30), Max: 98 (01 Aug 2020 18:32)  HR: 68 (02 Aug 2020 00:00) (58 - 134)  BP: 122/73 (02 Aug 2020 00:00) (82/58 - 208/81)  BP(mean): 84 (02 Aug 2020 00:00) (62 - 115)  ABP: --  ABP(mean): --  RR: 13 (02 Aug 2020 00:00) (9 - 31)  SpO2: 100% (02 Aug 2020 00:00) (96% - 100%)            07-31 @ 07:01  -  08-01 @ 07:00  --------------------------------------------------------  IN: 93.3 mL / OUT: 0 mL / NET: 93.3 mL        PHYSICAL EXAM:    GENERAL: NAD, cachectic   EYES: EOMI, PERRLA,   NECK: Supple, No JVD; Normal thyroid; Trachea midline  NERVOUS SYSTEM:  Alert & Oriented X3,  Motor Strength 5/5 B/L upper and lower extremities; DTRs 2+ intact and symmetric, left arm with clean dressing   CHEST/LUNG: No rales, rhonchi, wheezing   HEART: Regular rate and rhythm; No murmurs,   ABDOMEN: Soft, Nontender, Nondistended; Bowel sounds present  EXTREMITIES:  2+ Peripheral Pulses, No clubbing, cyanosis, or edema        LABS:  CBC Full  -  ( 01 Aug 2020 06:04 )  WBC Count : 14.60 K/uL  RBC Count : 2.86 M/uL  Hemoglobin : 8.5 g/dL  Hematocrit : 25.8 %  Platelet Count - Automated : 291 K/uL  Mean Cell Volume : 90.2 fl  Mean Cell Hemoglobin : 29.7 pg  Mean Cell Hemoglobin Concentration : 32.9 gm/dL  Auto Neutrophil # : 11.07 K/uL  Auto Lymphocyte # : 1.99 K/uL  Auto Monocyte # : 1.29 K/uL  Auto Eosinophil # : 0.10 K/uL  Auto Basophil # : 0.08 K/uL  Auto Neutrophil % : 75.9 %  Auto Lymphocyte % : 13.6 %  Auto Monocyte % : 8.8 %  Auto Eosinophil % : 0.7 %  Auto Basophil % : 0.5 %    08-01    133<L>  |  95<L>  |  21<H>  ----------------------------<  86  4.0   |  29  |  4.56<H>    Ca    8.9      01 Aug 2020 06:04  Phos  3.8     08-01  Mg     1.8     08-01    TPro  5.5<L>  /  Alb  1.4<L>  /  TBili  0.5  /  DBili  x   /  AST  14  /  ALT  17  /  AlkPhos  209<H>  08-01    PT/INR - ( 31 Jul 2020 02:45 )   PT: 16.7 sec;   INR: 1.45 ratio             Culture Results:   Growth in aerobic bottle: Gram Positive Cocci in Clusters (07-31 @ 08:06)  Culture Results:   Growth in aerobic bottle: Gram Positive Cocci in Clusters (07-31 @ 08:06)  Culture Results:   No growth (07-31 @ 06:41)  Culture Results:   >=3 organisms. Probable collection contamination. (07-30 @ 01:34)      RADIOLOGY & ADDITIONAL STUDIES REVIEWED:  ***    [ ]GOALS OF CARE DISCUSSION WITH PATIENT/FAMILY/PROXY:    CRITICAL CARE TIME SPENT: 35 minutes INTERVAL HPI/OVERNIGHT EVENTS: patient was hypoglycemic overnight, asymptomatic, D50 given. Her Hb was 6.5 today am. 1PRBCS was transfused.    PRESSORS: [ x] YES [ ] NO  WHICH: levophed     ANTIBIOTICS:                  DATE STARTED:  ANTIBIOTICS:                  DATE STARTED:  ANTIBIOTICS:                  DATE STARTED:    Antimicrobial:  piperacillin/tazobactam IVPB.. 3.375 Gram(s) IV Intermittent every 12 hours    Cardiovascular:    Pulmonary:    Hematalogic:    Other:  chlorhexidine 2% Cloths 1 Application(s) Topical <User Schedule>  epoetin joey-epbx (RETACRIT) Injectable 4000 Unit(s) IV Push <User Schedule>  glucagon  Injectable 1 milliGRAM(s) IntraMuscular once PRN  HYDROmorphone  Injectable 1 milliGRAM(s) IV Push every 6 hours PRN  insulin lispro (HumaLOG) corrective regimen sliding scale   SubCutaneous Before meals and at bedtime  pantoprazole    Tablet 40 milliGRAM(s) Oral before breakfast    chlorhexidine 2% Cloths 1 Application(s) Topical <User Schedule>  epoetin joey-epbx (RETACRIT) Injectable 4000 Unit(s) IV Push <User Schedule>  glucagon  Injectable 1 milliGRAM(s) IntraMuscular once PRN  HYDROmorphone  Injectable 1 milliGRAM(s) IV Push every 6 hours PRN  insulin lispro (HumaLOG) corrective regimen sliding scale   SubCutaneous Before meals and at bedtime  pantoprazole    Tablet 40 milliGRAM(s) Oral before breakfast  piperacillin/tazobactam IVPB.. 3.375 Gram(s) IV Intermittent every 12 hours    Drug Dosing Weight  Height (cm): 157.48 (29 Jul 2020 14:57)  Weight (kg): 40.3 (31 Jul 2020 01:35)  BMI (kg/m2): 16.3 (31 Jul 2020 01:35)  BSA (m2): 1.35 (31 Jul 2020 01:35)    CENTRAL LINE: [ ] YES [ ] NO  LOCATION:         CANALES: [ ] YES [ ] NO          A-LINE:  [ ] YES [ ] NO  LOCATION:             ICU Vital Signs Last 24 Hrs  T(C): 35.9 (02 Aug 2020 15:00), Max: 36.7 (01 Aug 2020 18:32)  T(F): 96.6 (02 Aug 2020 15:00), Max: 98 (01 Aug 2020 18:32)  HR: 70 (02 Aug 2020 16:00) (60 - 102)  BP: 149/86 (02 Aug 2020 16:00) (82/58 - 208/81)  BP(mean): 102 (02 Aug 2020 16:00) (62 - 115)  ABP: --  ABP(mean): --  RR: 15 (02 Aug 2020 16:00) (9 - 23)  SpO2: 100% (02 Aug 2020 16:00) (73% - 100%)            08-01 @ 07:01  -  08-02 @ 07:00  --------------------------------------------------------  IN: 818.7 mL / OUT: 0 mL / NET: 818.7 mL      PHYSICAL EXAM:    GENERAL: NAD, cachectic   EYES: EOMI, PERRLA,   NECK: Supple, No JVD; Normal thyroid; Trachea midline  NERVOUS SYSTEM:  Alert & Oriented X3,  Motor Strength 5/5 B/L upper and lower extremities; DTRs 2+ intact and symmetric, left arm with clean dressing   CHEST/LUNG: No rales, rhonchi, wheezing   HEART: Regular rate and rhythm; No murmurs,   ABDOMEN: Soft, Nontender, Nondistended; Bowel sounds present  EXTREMITIES:  2+ Peripheral Pulses, No clubbing, cyanosis, or edema      LABS:  CBC Full  -  ( 02 Aug 2020 09:51 )  WBC Count : 10.57 K/uL  RBC Count : 2.17 M/uL  Hemoglobin : 6.5 g/dL  Hematocrit : 19.6 %  Platelet Count - Automated : 247 K/uL  Mean Cell Volume : 90.3 fl  Mean Cell Hemoglobin : 30.0 pg  Mean Cell Hemoglobin Concentration : 33.2 gm/dL  Auto Neutrophil # : x  Auto Lymphocyte # : x  Auto Monocyte # : x  Auto Eosinophil # : x  Auto Basophil # : x  Auto Neutrophil % : x  Auto Lymphocyte % : x  Auto Monocyte % : x  Auto Eosinophil % : x  Auto Basophil % : x    08-02    133<L>  |  97  |  10  ----------------------------<  62<L>  3.4<L>   |  29  |  2.75<H>    Ca    8.3<L>      02 Aug 2020 06:35  Phos  2.0     08-02  Mg     1.7     08-02    TPro  4.8<L>  /  Alb  1.5<L>  /  TBili  0.5  /  DBili  x   /  AST  15  /  ALT  13  /  AlkPhos  173<H>  08-02        Culture Results:   Growth in aerobic bottle: Gram Positive Cocci in Clusters  Growth in anaerobic bottle: Gram Positive Cocci in Clusters (07-31 @ 08:06)  Culture Results:   Growth in aerobic bottle: Staphylococcus epidermidis  "Susceptibilities not performed"  Growth in anaerobic bottle: Gram Positive Cocci in Clusters (07-31 @ 08:06)  Culture Results:   Normal skin albin isolated (07-31 @ 06:41)      RADIOLOGY & ADDITIONAL STUDIES REVIEWED:  ***    [ ]GOALS OF CARE DISCUSSION WITH PATIENT/FAMILY/PROXY:    CRITICAL CARE TIME SPENT: 35 minutes

## 2020-08-02 NOTE — PROGRESS NOTE ADULT - SUBJECTIVE AND OBJECTIVE BOX
Patient is seen and examined at the bed side, is afebrile.  She is still on pressor.  The WBC count trended down to normal.        REVIEW OF SYSTEMS: All other review systems are negative        ALLERGIES: sulfADIAZINE (Angioedema)        ICU Vital Signs Last 24 Hrs  T(C): 35.9 (02 Aug 2020 17:00), Max: 36.5 (02 Aug 2020 00:15)  T(F): 96.6 (02 Aug 2020 17:00), Max: 97.7 (02 Aug 2020 00:15)  HR: 81 (02 Aug 2020 20:00) (60 - 102)  BP: 125/80 (02 Aug 2020 20:00) (82/58 - 208/81)  BP(mean): 90 (02 Aug 2020 20:00) (62 - 115)  ABP: --  ABP(mean): --  RR: 13 (02 Aug 2020 20:00) (9 - 23)  SpO2: 100% (02 Aug 2020 20:00) (73% - 100%)        PHYSICAL EXAM:  GENERAL: Not in distress   CHEST/LUNG:  Not using accessory muscles  HEART: s1 and s2 present  ABDOMEN:  Nontender and  Nondistended  EXTREMITIES: Left UE ACE bandage in placed, the swelling and tenderness is improving   CNS: Awake and Alert          LABS:                        9.7    10.69 )-----------( 281      ( 02 Aug 2020 16:42 )             28.5                           8.5    14.60 )-----------( 291      ( 01 Aug 2020 06:04 )             25.8                           9.0    14.43 )-----------( 241      ( 2020 14:24 )             27.1       08-02    132<L>  |  97  |  11  ----------------------------<  103<H>  3.6   |  28  |  3.13<H>    Ca    8.4      02 Aug 2020 16:53  Phos  2.1     08-  Mg     1.6     08-    TPro  4.8<L>  /  Alb  1.5<L>  /  TBili  0.5  /  DBili  x   /  AST  15  /  ALT  13  /  AlkPhos  173<H>  08-      08-01    133<L>  |  95<L>  |  21<H>  ----------------------------<  86  4.0   |  29  |  4.56<H>    Ca    8.9      01 Aug 2020 06:04  Phos  3.8       Mg     1.8         TPro  5.5<L>  /  Alb  1.4<L>  /  TBili  0.5  /  DBili  x   /  AST  14  /  ALT  17  /  AlkPhos  209<H>        PT/INR - ( 2020 16:23 )   PT: 15.1 sec;   INR: 1.31 ratio      PTT - ( 2020 16:23 )  PTT:35.4 sec        CAPILLARY BLOOD GLUCOSE  POCT Blood Glucose.: 114 mg/dL (2020 18:11)  POCT Blood Glucose.: 111 mg/dL (2020 12:15)  POCT Blood Glucose.: 102 mg/dL (2020 10:21)  POCT Blood Glucose.: 77 mg/dL (2020 09:09)  POCT Blood Glucose.: 56 mg/dL (2020 09:07)        Urinalysis Basic - ( 2020 17:02 )  Color: Red / Appearance: bloody / S.015 / pH: x  Gluc: x / Ketone: Negative  / Bili: Small / Urobili: Negative   Blood: x / Protein: 100 / Nitrite: Negative   Leuk Esterase: Moderate / RBC: >50 /HPF / WBC 11-25 /HPF   Sq Epi: x / Non Sq Epi: Few /HPF / Bacteria: Moderate /HPF          Vancomycin Level, Trough (20 @ 15:53)    Vancomycin Level, Trough: 18.2: Vancomycin trough levels should be rapidly reached and maintained at  15-20 ug/ml for life threatening MRSA  infections such as sepsis, endocarditis, osteomyelitis and pneumonia.     Vancomycin Level, Trough (.20 @ 08:16)    Vancomycin Level, Trough: 29.2: Vancomycin trough levels should be rapidly reached and maintained at  15-20 ug/ml for life threatening MRSA  infections such as sepsis, endocarditis, osteomyelitis and pneumonia.         MEDICATIONS  (STANDING):      chlorhexidine 2% Cloths 1 Application(s) Topical <User Schedule>  epoetin joey-epbx (RETACRIT) Injectable 4000 Unit(s) IV Push <User Schedule>  insulin lispro (HumaLOG) corrective regimen sliding scale   SubCutaneous Before meals and at bedtime  pantoprazole    Tablet 40 milliGRAM(s) Oral before breakfast  piperacillin/tazobactam IVPB.. 3.375 Gram(s) IV Intermittent every 12 hours      RADIOLOGY & ADDITIONAL TESTS:      < from: Transthoracic Echocardiogram (20 @ 08:49) >  1. Normal mitral valve.  2. Normal trileaflet aortic valve. Mild aortic  regurgitation.  3. Aortic Root: 2.7 cm.  4. Normal left atrium.  LA volume index = 26 cc/m2.  5. Mild concentric left ventricular hypertrophy.  6. Hyperdynamic left ventricular systolic function (EF  >70%).  7. Grade I diastolic dysfunction (Impaired relaxation).  8. Normal right atrium.  9. Normal right ventricular size and systolic function  (TAPSE  2.1cm).  10. Unable to estimate RVSP.  11. Normal tricuspid valve.  12. Normal pulmonic valve.  13. Normal pericardium with no pericardial effusion.    < end of copied text >    20: US Duplex Hemodialysis Access (20 @ 20:44) >  impression: The left upper extremity AVG (likely brachial artery-graft-cephalic vein) is patent. There is narrowing of the graft lumen due to the presence of thrombus, about 40% narrowing. 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      20: Xray Chest 1 View-PORTABLE IMMEDIATE (20 @ 17:54) The cardiac silhouette is within normal limits. The lungs are clear. No pleural abnormality.          MICROBIOLOGY DATA:    Culture - Blood (20 @ 08:06)    Gram Stain:   Growth in aerobic bottle: Gram Positive Cocci in Clusters  Growth in anaerobic bottle: Gram Positive Cocci in Clusters    Specimen Source: .Blood Blood-Peripheral    Culture Results:   Growth in aerobic bottle: Staphylococcus epidermidis "Susceptibilities  not performed"  Growth in anaerobic bottle: Gram Positive Cocci in Clusters        Culture - Blood (20 @ 08:06)    Gram Stain:   Growth in aerobic bottle: Gram Positive Cocci in Clusters    Specimen Source: .Blood Blood-Peripheral    Culture Results:   Growth in aerobic bottle: Gram Positive Cocci in Clusters        Culture - Blood (20 @ 08:06)    Specimen Source: .Blood Blood-Peripheral    Culture Results:   No growth to date.      Culture - Surgical Swab (20 @ 06:41)    Specimen Source: .Surgical Swab left arm av graft C&S    Culture Results:   No growth      COVID-19  Antibody - for prior infection screening (20 @ 10:08)    COVID-19 IgG Antibody Index: 7.70: Abbott CMIA  Negative Result    <= 1.39 Index  Positive Result      >= 1.40 Index Index    COVID-19 IgG Antibody Interpretation: Positive: This test has not been reviewed by the FDA by the standard review  process. It has been authorized for emergency use by the FDA. [employer] has validated this test to be accurate.  Negative results do not rule out SARS-CoV-2 infection, particularly in  those who have been in recent contact with the virus. Follow-up testing  with a molecular diagnostic test should be considered to rule out  infection in these individuals.  Results from antibody testing should not be used as thesole basis to  diagnose or exclude SARS-CoV-2 infection, or to inform infection status.  Positive results may rarely be due to past or present infection with  non-SARS-CoV-2 coronavirus strains, such as coronavirus HKU1, NL63, OC43,  or 229E. [employer], through extensive validation  testing, has confirmed that this risk is minimal with this test.      Culture - Urine (20 @ 01:34)    Specimen Source: .Urine Clean Catch (Midstream)    Culture Results:   >=3 organisms. Probable collection contamination.      Culture - Blood (20 @ 23:02)    Gram Stain:   Growth in anaerobic bottle: Gram Positive Cocci in Clusters  Growth in aerobic bottle: Gram Positive Cocci in Clusters    -  Ampicillin/Sulbactam: R <=8/4    -  Cefazolin: R <=4    -  Clindamycin: R >4    -  Erythromycin: R >4    -  Gentamicin: R >8 Should not be used as monotherapy    -  Oxacillin: R >2    -  Penicillin: R 8    -  RIF- Rifampin: S <=1 Should not be used as monotherapy    -  Tetra/Doxy: S <=1    -  Trimethoprim/Sulfamethoxazole: R >2/38    -  Vancomycin: S 2    -  Coagulase negative Staphylococcus: Detec    Specimen Source: .Blood Blood-Peripheral    Organism: Blood Culture PCR    Organism: Staphylococcus epidermidis    Culture Results:   Growth in aerobic and anaerobic bottles: Staphylococcus epidermidis  "Due to technical problems, Proteus sp. will Not be reported as part of  the BCID panel until further notice"  ***Blood Panel PCR results on this specimen are available  approximately 3 hours after the Gram stain result.***  Gram stain, PCR, and/or culture results may not always  correspond due to difference in methodologies.  ************************************************************  This PCR assay was performed using [employer].  The following targets are tested for: Enterococcus,  vancomycin resistant enterococci, Listeria monocytogenes,  coagulase negative staphylococci, S. aureus,  methicillin resistant S. aureus, Streptococcus agalactiae  (Group B), S. pneumoniae, S. pyogenes (Group A),  Acinetobacter baumannii, Enterobacter cloacae, E. coli,  Klebsiella oxytoca, K. pneumoniae, Proteus sp.,  Serratia marcescens, Haemophilus influenzae,  Neisseria meningitidis, Pseudomonas aeruginosa, Candida  albicans, C. glabrata, C krusei, C parapsilosis,  C. tropicalis and the KPC resistance gene.    Organism Identification: Blood Culture PCR  Staphylococcus epidermidis    Method Type: PCR    Method Type: JOSÉ MIGUEL      Culture - Blood (20 @ 23:02)    Gram Stain:   Growth in anaerobic bottle: Gram Positive Cocci in Clusters  Growth in aerobic bottle: Gram Positive Cocci in Clusters    -  Coagulase negative Staphylococcus: Detec    Specimen Source: .Blood Blood-Peripheral    Organism: Blood Culture PCR    Culture Results:   Growth in aerobic and anaerobic bottles: Staphylococcus epidermidis  "Due to technical problems, Proteus sp. will Not be reported as part of  the BCID panel until further notice"  ***Blood Panel PCR results on this specimen are available  approximately 3 hours after the Gram stain result.***  Gram stain, PCR, and/or culture results may not always  correspond due to difference in methodologies.  ************************************************************  This PCR assay was performed using [employer].  The following targets are tested for: Enterococcus,  vancomycin resistant enterococci, Listeria monocytogenes,  coagulase negative staphylococci, S. aureus,  methicillin resistant S. aureus, Streptococcus agalactiae  (Group B), S. pneumoniae, S. pyogenes (Group A),  Acinetobacter baumannii, Enterobacter cloacae, E. coli,  Klebsiella oxytoca, K. pneumoniae, Proteus sp.,  Serratia marcescens, Haemophilus influenzae,  Neisseria meningitidis, Pseudomonas aeruginosa, Candida  albicans, C. glabrata, C krusei, C parapsilosis,  C. tropicalis and the KPC resistance gene.    Organism Identification: Blood Culture PCR    Method Type: PCR Patient is seen and examined at the bed side, is afebrile.  She is still on pressor.  The WBC count trended down to normal.        REVIEW OF SYSTEMS: All other review systems are negative        ALLERGIES: sulfADIAZINE (Angioedema)        ICU Vital Signs Last 24 Hrs  T(C): 35.9 (02 Aug 2020 17:00), Max: 36.5 (02 Aug 2020 00:15)  T(F): 96.6 (02 Aug 2020 17:00), Max: 97.7 (02 Aug 2020 00:15)  HR: 81 (02 Aug 2020 20:00) (60 - 102)  BP: 125/80 (02 Aug 2020 20:00) (82/58 - 208/81)  BP(mean): 90 (02 Aug 2020 20:00) (62 - 115)  ABP: --  ABP(mean): --  RR: 13 (02 Aug 2020 20:00) (9 - 23)  SpO2: 100% (02 Aug 2020 20:00) (73% - 100%)        PHYSICAL EXAM:  GENERAL: Not in distress   CHEST/LUNG:  Not using accessory muscles  HEART: s1 and s2 present  ABDOMEN:  Nontender and  Nondistended  EXTREMITIES: Left UE ACE bandage in placed, the swelling and tenderness is improving   CNS: Awake and Alert          LABS:                        9.7    10.69 )-----------( 281      ( 02 Aug 2020 16:42 )             28.5                           8.5    14.60 )-----------( 291      ( 01 Aug 2020 06:04 )             25.8                           9.0    14.43 )-----------( 241      ( 2020 14:24 )             27.1       08-02    132<L>  |  97  |  11  ----------------------------<  103<H>  3.6   |  28  |  3.13<H>    Ca    8.4      02 Aug 2020 16:53  Phos  2.1     08-  Mg     1.6     08-    TPro  4.8<L>  /  Alb  1.5<L>  /  TBili  0.5  /  DBili  x   /  AST  15  /  ALT  13  /  AlkPhos  173<H>  08-      08-01    133<L>  |  95<L>  |  21<H>  ----------------------------<  86  4.0   |  29  |  4.56<H>    Ca    8.9      01 Aug 2020 06:04  Phos  3.8       Mg     1.8         TPro  5.5<L>  /  Alb  1.4<L>  /  TBili  0.5  /  DBili  x   /  AST  14  /  ALT  17  /  AlkPhos  209<H>        PT/INR - ( 2020 16:23 )   PT: 15.1 sec;   INR: 1.31 ratio      PTT - ( 2020 16:23 )  PTT:35.4 sec        CAPILLARY BLOOD GLUCOSE  POCT Blood Glucose.: 114 mg/dL (2020 18:11)  POCT Blood Glucose.: 111 mg/dL (2020 12:15)  POCT Blood Glucose.: 102 mg/dL (2020 10:21)  POCT Blood Glucose.: 77 mg/dL (2020 09:09)  POCT Blood Glucose.: 56 mg/dL (2020 09:07)        Urinalysis Basic - ( 2020 17:02 )  Color: Red / Appearance: bloody / S.015 / pH: x  Gluc: x / Ketone: Negative  / Bili: Small / Urobili: Negative   Blood: x / Protein: 100 / Nitrite: Negative   Leuk Esterase: Moderate / RBC: >50 /HPF / WBC 11-25 /HPF   Sq Epi: x / Non Sq Epi: Few /HPF / Bacteria: Moderate /HPF        Vancomycin Level, Trough (20 @ 15:53)    Vancomycin Level, Trough: 18.2: Vancomycin trough levels should be rapidly reached and maintained at  15-20 ug/ml for life threatening MRSA  infections such as sepsis, endocarditis, osteomyelitis and pneumonia.     Vancomycin Level, Trough (.20 @ 08:16)    Vancomycin Level, Trough: 29.2: Vancomycin trough levels should be rapidly reached and maintained at  15-20 ug/ml for life threatening MRSA  infections such as sepsis, endocarditis, osteomyelitis and pneumonia.         MEDICATIONS  (STANDING):      chlorhexidine 2% Cloths 1 Application(s) Topical <User Schedule>  epoetin joey-epbx (RETACRIT) Injectable 4000 Unit(s) IV Push <User Schedule>  insulin lispro (HumaLOG) corrective regimen sliding scale   SubCutaneous Before meals and at bedtime  pantoprazole    Tablet 40 milliGRAM(s) Oral before breakfast  piperacillin/tazobactam IVPB.. 3.375 Gram(s) IV Intermittent every 12 hours      RADIOLOGY & ADDITIONAL TESTS:      < from: Transthoracic Echocardiogram (20 @ 08:49) >  1. Normal mitral valve.  2. Normal trileaflet aortic valve. Mild aortic  regurgitation.  3. Aortic Root: 2.7 cm.  4. Normal left atrium.  LA volume index = 26 cc/m2.  5. Mild concentric left ventricular hypertrophy.  6. Hyperdynamic left ventricular systolic function (EF  >70%).  7. Grade I diastolic dysfunction (Impaired relaxation).  8. Normal right atrium.  9. Normal right ventricular size and systolic function  (TAPSE  2.1cm).  10. Unable to estimate RVSP.  11. Normal tricuspid valve.  12. Normal pulmonic valve.  13. Normal pericardium with no pericardial effusion.    < end of copied text >    20: US Duplex Hemodialysis Access (20 @ 20:44) >  impression: The left upper extremity AVG (likely brachial artery-graft-cephalic vein) is patent. There is narrowing of the graft lumen due to the presence of thrombus, about 40% narrowing. 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      20: Xray Chest 1 View-PORTABLE IMMEDIATE (20 @ 17:54) The cardiac silhouette is within normal limits. The lungs are clear. No pleural abnormality.          MICROBIOLOGY DATA:    Culture - Blood (20 @ 08:06)    Gram Stain:   Growth in aerobic bottle: Gram Positive Cocci in Clusters  Growth in anaerobic bottle: Gram Positive Cocci in Clusters    Specimen Source: .Blood Blood-Peripheral    Culture Results:   Growth in aerobic bottle: Staphylococcus epidermidis "Susceptibilities  not performed"  Growth in anaerobic bottle: Gram Positive Cocci in Clusters        Culture - Blood (20 @ 08:06)    Gram Stain:   Growth in aerobic bottle: Gram Positive Cocci in Clusters    Specimen Source: .Blood Blood-Peripheral    Culture Results:   Growth in aerobic bottle: Gram Positive Cocci in Clusters        Culture - Blood (20 @ 08:06)    Specimen Source: .Blood Blood-Peripheral    Culture Results:   No growth to date.      Culture - Surgical Swab (20 @ 06:41)    Specimen Source: .Surgical Swab left arm av graft C&S    Culture Results:   No growth      COVID-19  Antibody - for prior infection screening (20 @ 10:08)    COVID-19 IgG Antibody Index: 7.70: Abbott CMIA  Negative Result    <= 1.39 Index  Positive Result      >= 1.40 Index Index    COVID-19 IgG Antibody Interpretation: Positive: This test has not been reviewed by the FDA by the standard review  process. It has been authorized for emergency use by the FDA. [employer] has validated this test to be accurate.  Negative results do not rule out SARS-CoV-2 infection, particularly in  those who have been in recent contact with the virus. Follow-up testing  with a molecular diagnostic test should be considered to rule out  infection in these individuals.  Results from antibody testing should not be used as thesole basis to  diagnose or exclude SARS-CoV-2 infection, or to inform infection status.  Positive results may rarely be due to past or present infection with  non-SARS-CoV-2 coronavirus strains, such as coronavirus HKU1, NL63, OC43,  or 229E. [employer], through extensive validation  testing, has confirmed that this risk is minimal with this test.      Culture - Urine (20 @ 01:34)    Specimen Source: .Urine Clean Catch (Midstream)    Culture Results:   >=3 organisms. Probable collection contamination.      Culture - Blood (20 @ 23:02)    Gram Stain:   Growth in anaerobic bottle: Gram Positive Cocci in Clusters  Growth in aerobic bottle: Gram Positive Cocci in Clusters    -  Ampicillin/Sulbactam: R <=8/4    -  Cefazolin: R <=4    -  Clindamycin: R >4    -  Erythromycin: R >4    -  Gentamicin: R >8 Should not be used as monotherapy    -  Oxacillin: R >2    -  Penicillin: R 8    -  RIF- Rifampin: S <=1 Should not be used as monotherapy    -  Tetra/Doxy: S <=1    -  Trimethoprim/Sulfamethoxazole: R >2/38    -  Vancomycin: S 2    -  Coagulase negative Staphylococcus: Detec    Specimen Source: .Blood Blood-Peripheral    Organism: Blood Culture PCR    Organism: Staphylococcus epidermidis    Culture Results:   Growth in aerobic and anaerobic bottles: Staphylococcus epidermidis  "Due to technical problems, Proteus sp. will Not be reported as part of  the BCID panel until further notice"  ***Blood Panel PCR results on this specimen are available  approximately 3 hours after the Gram stain result.***  Gram stain, PCR, and/or culture results may not always  correspond due to difference in methodologies.  ************************************************************  This PCR assay was performed using [employer].  The following targets are tested for: Enterococcus,  vancomycin resistant enterococci, Listeria monocytogenes,  coagulase negative staphylococci, S. aureus,  methicillin resistant S. aureus, Streptococcus agalactiae  (Group B), S. pneumoniae, S. pyogenes (Group A),  Acinetobacter baumannii, Enterobacter cloacae, E. coli,  Klebsiella oxytoca, K. pneumoniae, Proteus sp.,  Serratia marcescens, Haemophilus influenzae,  Neisseria meningitidis, Pseudomonas aeruginosa, Candida  albicans, C. glabrata, C krusei, C parapsilosis,  C. tropicalis and the KPC resistance gene.    Organism Identification: Blood Culture PCR  Staphylococcus epidermidis    Method Type: PCR    Method Type: JOSÉ MIGUEL      Culture - Blood (20 @ 23:02)    Gram Stain:   Growth in anaerobic bottle: Gram Positive Cocci in Clusters  Growth in aerobic bottle: Gram Positive Cocci in Clusters    -  Coagulase negative Staphylococcus: Detec    Specimen Source: .Blood Blood-Peripheral    Organism: Blood Culture PCR    Culture Results:   Growth in aerobic and anaerobic bottles: Staphylococcus epidermidis  "Due to technical problems, Proteus sp. will Not be reported as part of  the BCID panel until further notice"  ***Blood Panel PCR results on this specimen are available  approximately 3 hours after the Gram stain result.***  Gram stain, PCR, and/or culture results may not always  correspond due to difference in methodologies.  ************************************************************  This PCR assay was performed using [employer].  The following targets are tested for: Enterococcus,  vancomycin resistant enterococci, Listeria monocytogenes,  coagulase negative staphylococci, S. aureus,  methicillin resistant S. aureus, Streptococcus agalactiae  (Group B), S. pneumoniae, S. pyogenes (Group A),  Acinetobacter baumannii, Enterobacter cloacae, E. coli,  Klebsiella oxytoca, K. pneumoniae, Proteus sp.,  Serratia marcescens, Haemophilus influenzae,  Neisseria meningitidis, Pseudomonas aeruginosa, Candida  albicans, C. glabrata, C krusei, C parapsilosis,  C. tropicalis and the KPC resistance gene.    Organism Identification: Blood Culture PCR    Method Type: PCR

## 2020-08-02 NOTE — PROGRESS NOTE ADULT - SUBJECTIVE AND OBJECTIVE BOX
Problem List:  ESRD  Ligation of Left AVF  Reduced po intake  Reduced H/H      PAST MEDICAL & SURGICAL HISTORY:  DM (diabetes mellitus)  Vitamin D deficiency  ESRD (end stage renal disease)  Hyperphosphatemia  Hypertension  S/P bunionectomy: bilateral great toes  H/O hemorrhoidectomy  H/O oral cancer  AVF (arteriovenous fistula): x3      sulfADIAZINE (Angioedema)      MEDICATIONS  (STANDING):  chlorhexidine 2% Cloths 1 Application(s) Topical <User Schedule>  insulin lispro (HumaLOG) corrective regimen sliding scale   SubCutaneous Before meals and at bedtime  midodrine 10 milliGRAM(s) Oral every 8 hours  pantoprazole    Tablet 40 milliGRAM(s) Oral before breakfast  piperacillin/tazobactam IVPB.. 3.375 Gram(s) IV Intermittent every 12 hours    MEDICATIONS  (PRN):  glucagon  Injectable 1 milliGRAM(s) IntraMuscular once PRN Glucose <70 milliGRAM(s)/deciLiter  HYDROmorphone  Injectable 1 milliGRAM(s) IV Push every 6 hours PRN Severe Pain (7 - 10)    Hemoglobin: 8.2 g/dL (07.30.20 @ 06:31)                            6.5    10.57 )-----------( 247      ( 02 Aug 2020 09:51 )             19.6     08-02    133<L>  |  97  |  10  ----------------------------<  62<L>  3.4<L>   |  29  |  2.75<H>    Ca    8.3<L>      02 Aug 2020 06:35  Phos  2.0     08-02  Mg     1.7     08-02    TPro  4.8<L>  /  Alb  1.5<L>  /  TBili  0.5  /  DBili  x   /  AST  15  /  ALT  13  /  AlkPhos  173<H>  08-02            REVIEW OF SYSTEMS:  WEAK AND GENERALIZED PAIN.  Reduced appetite    VITALS:  T(F): 97 (08-02-20 @ 07:30), Max: 98 (08-01-20 @ 18:32)  HR: 63 (08-02-20 @ 10:00)  BP: 110/72 (08-02-20 @ 10:00)  RR: 16 (08-02-20 @ 10:00)  SpO2: 100% (08-02-20 @ 10:00)  Wt(kg): --    08-01 @ 07:01  -  08-02 @ 07:00  --------------------------------------------------------  IN: 818.7 mL / OUT: 0 mL / NET: 818.7 mL    08-02 @ 07:01  -  08-02 @ 11:27  --------------------------------------------------------  IN: 0 mL / OUT: 0 mL / NET: 0 mL        PHYSICAL EXAM:  Constitutional: cacchectic  Neck: No JVD, no carotid bruit, supple, no adenopathy  Respiratory: Good air entrance B/L, no wheezes, rales or rhonchi  Cardiovascular: S1, S2, RRR.  Abdomen: BS+, soft, no tenderness, no bruit  Pelvis: bladder nondistended  Extremities: No cyanosis or clubbing. No peripheral edema.   Pulses: right femoral cathetr, hematoma in right flank area and inner thigh area right.

## 2020-08-02 NOTE — PROGRESS NOTE ADULT - ASSESSMENT
69F with pmhx oral cancer and ESRD post ligation and surgery of left AVF  now with anemia, H/H dropped 6.6/20.1, 1u prbc ordered  also with hypokalemia    - potassium repletion  - monitor H/H, transfuse as needed  - daily dressing changes with xeroform, dry gauze and kerlex  - neurovascular exams  - elevate LUE  - cont IV abx as per ID/Cxs  - cont care as per ICU

## 2020-08-02 NOTE — PROGRESS NOTE ADULT - SUBJECTIVE AND OBJECTIVE BOX
CARDIOLOGY/MEDICAL ATTENDING    CHIEF COMPLAINT:Patient is a 69y old  Female who presents with a chief complaint of Painful swelling at AV fistula. Pt appears comfortable.    	  REVIEW OF SYSTEMS:  [x ] Unable to obtain    PHYSICAL EXAM:  T(C): 36.1 (08-02-20 @ 07:30), Max: 36.7 (08-01-20 @ 18:32)  HR: 63 (08-02-20 @ 10:00) (63 - 102)  BP: 110/72 (08-02-20 @ 10:00) (82/58 - 208/81)  RR: 16 (08-02-20 @ 10:00) (9 - 23)  SpO2: 100% (08-02-20 @ 10:00) (73% - 100%)  Wt(kg): --  I&O's Summary    01 Aug 2020 07:01  -  02 Aug 2020 07:00  --------------------------------------------------------  IN: 818.7 mL / OUT: 0 mL / NET: 818.7 mL    02 Aug 2020 07:01  -  02 Aug 2020 11:18  --------------------------------------------------------  IN: 0 mL / OUT: 0 mL / NET: 0 mL        Appearance: Normal	  HEENT:   Normal oral mucosa, PERRL, EOMI	  Lymphatic: No lymphadenopathy  Cardiovascular: Normal S1 S2, No JVD, No murmurs, No edema  Respiratory: Lungs clear to auscultation	  Psychiatry: A & O x 3, Mood & affect appropriate  Gastrointestinal:  Soft, Non-tender, + BS	  Skin: No rashes, No ecchymoses, No cyanosis	  Neurologic: Non-focal  Extremities: Normal range of motion, No clubbing, cyanosis or edema      MEDICATIONS  (STANDING):  chlorhexidine 2% Cloths 1 Application(s) Topical <User Schedule>  insulin lispro (HumaLOG) corrective regimen sliding scale   SubCutaneous Before meals and at bedtime  midodrine 10 milliGRAM(s) Oral every 8 hours  pantoprazole    Tablet 40 milliGRAM(s) Oral before breakfast  piperacillin/tazobactam IVPB.. 3.375 Gram(s) IV Intermittent every 12 hours      TELEMETRY: nsr	      	  LABS:	 	                        6.5    10.57 )-----------( 247      ( 02 Aug 2020 09:51 )             19.6     08-02    133<L>  |  97  |  10  ----------------------------<  62<L>  3.4<L>   |  29  |  2.75<H>    Ca    8.3<L>      02 Aug 2020 06:35  Phos  2.0     08-02  Mg     1.7     08-02    TPro  4.8<L>  /  Alb  1.5<L>  /  TBili  0.5  /  DBili  x   /  AST  15  /  ALT  13  /  AlkPhos  173<H>  08-02    proBNP:   Lipid Profile: Cholesterol <50  LDL <24  HDL 11  TG 77    HgA1c:   TSH: Thyroid Stimulating Hormone, Serum: 3.09 uU/mL (07-30 @ 06:31)      	      Culture - Blood (07.31.20 @ 08:06)    Gram Stain:   Growth in aerobic bottle: Gram Positive Cocci in Clusters  Growth in anaerobic bottle: Gram Positive Cocci in Clusters    Specimen Source: .Blood Blood-Peripheral    Culture Results:   Growth in aerobic bottle: Gram Positive Cocci in Clusters  Growth in anaerobic bottle: Gram Positive Cocci in Clusters    Culture - Blood (07.31.20 @ 08:06)    Gram Stain:   Growth in aerobic bottle: Gram Positive Cocci in Clusters  Growth in anaerobic bottle: Gram Positive Cocci in Clusters    Specimen Source: .Blood Blood-Peripheral    Culture Results:   Growth in aerobic bottle: Gram Positive Cocci in Clusters  Growth in anaerobic bottle: Gram Positive Cocci in Clusters

## 2020-08-02 NOTE — PROGRESS NOTE ADULT - SUBJECTIVE AND OBJECTIVE BOX
INTERVAL HPI/OVERNIGHT EVENTS:  No acute events overnight. Pt resting comfortably, c/o weakness and left upper extremity pain.     MEDICATIONS  (STANDING):  chlorhexidine 2% Cloths 1 Application(s) Topical <User Schedule>  insulin lispro (HumaLOG) corrective regimen sliding scale   SubCutaneous Before meals and at bedtime  midodrine 10 milliGRAM(s) Oral every 8 hours  norepinephrine Infusion 0.1 MICROgram(s)/kG/Min (3.78 mL/Hr) IV Continuous <Continuous>  pantoprazole    Tablet 40 milliGRAM(s) Oral before breakfast  piperacillin/tazobactam IVPB.. 3.375 Gram(s) IV Intermittent every 12 hours    MEDICATIONS  (PRN):  glucagon  Injectable 1 milliGRAM(s) IntraMuscular once PRN Glucose <70 milliGRAM(s)/deciLiter  HYDROmorphone  Injectable 1 milliGRAM(s) IV Push every 3 hours PRN Severe Pain (7 - 10)    Vital Signs Last 24 Hrs  T(C): 36.4 (01 Aug 2020 04:15), Max: 36.4 (01 Aug 2020 04:15)  T(F): 97.5 (01 Aug 2020 04:15), Max: 97.5 (01 Aug 2020 04:15)  HR: 63 (01 Aug 2020 07:30) (55 - 134)  BP: 117/76 (01 Aug 2020 07:30) (75/59 - 194/90)  BP(mean): 83 (01 Aug 2020 07:30) (60 - 116)  RR: 12 (01 Aug 2020 07:30) (0 - 25)  SpO2: 100% (01 Aug 2020 07:30) (56% - 100%)    Physical:  General: Alert and oriented, not in acute distress  Resp: Breathing unlabored  Extremities: anteromedial left upper extremity wound with no active bleeding or drainage; dressing with serosanguinous drainage removed and changed; wound redressed with xeroform, dry gauze and kerlex and ace wrap. hand with swelling, 2+ radial and ulnar pulses dopplerable    I&O's Detail  31 Jul 2020 07:01  -  01 Aug 2020 07:00  --------------------------------------------------------  IN:    IV PiggyBack: 50 mL    norepinephrine Infusion: 43.3 mL  Total IN: 93.3 mL    OUT:  Total OUT: 0 mL  Total NET: 93.3 mL    LABS:                      8.5    14.60 )-----------( 291      ( 01 Aug 2020 06:04 )             25.8             08-01    133<L>  |  95<L>  |  21<H>  ----------------------------<  86  4.0   |  29  |  4.56<H>    Ca    8.9      01 Aug 2020 06:04  Phos  3.8     08-01  Mg     1.8     08-01    TPro  5.5<L>  /  Alb  1.4<L>  /  TBili  0.5  /  DBili  x   /  AST  14  /  ALT  17  /  AlkPhos  209<H>  08-01

## 2020-08-02 NOTE — PROGRESS NOTE ADULT - ASSESSMENT
Patient is a 69y old  Female from [facility], with PMH of HTN, DM, Hyperphosphatemia,  Oral cancer(s/p excision), ESRD on HD via AVF  which created 3-4 years ago s/p exploration of AVF, interposition graft 6/2017, send in  to the ER for evaluation of  left arm AV fistula pain and swelling x1 week. Last week when she went for her HD session and developed gradual onset of swelling and pain at the AVF site on L arm. As per the patient the HD nurse had inserted the needle incorrectly that caused the swelling and severe pain. The AVF was not occluded and she had her HD sessions as per the schedule Tu/Th/Sat with her last HD session being yesterday.  On admission, she has no fever or Leukocytosis, but admission blood cultures growing GPC in clusters. She has started on Zosyn and Vancomycin and the ID consult requested to assist with further evaluation and antibiotic management.      # Bacteremia- 7/29/20- Coagulase Negative Staph. - ? source AVG - Repeat Blood Cx from 7/31 still positive- TTE as of 7/31 is negative for vegetation   # Infected AVG with surrounding cellulitis - s/p ligation of Left  AVG pseudoaneurysm 7/30  # Septic shock- Post-op 7/30- transferred to ICU since requiring pressor    would recommend:    1. Follow up Repeat Blood cultures to document clearing the blood stream  2. Dose Vancomycin based on level and goal level between 15 to 20  3. Wound care as per Vascular team  4. Wean off pressor as tolerated     d/w ICU team    Attending Attestation:    Spent more than 45 minutes on total encounter, more than 50 % of the visit was spent counseling and/or coordinating care by the Attending physician.

## 2020-08-02 NOTE — PROGRESS NOTE ADULT - ASSESSMENT
69 yr old F from [facility], with PMH of HTN, ESRD, DM, Hyperphosphatemia,  Oral cancer(s/p excision) , comes to the ED  with left arm AV fistula pain and swelling x1 week,sepsis due to staph epi, s/p ligation of AVF,now acute anemia.  1.ICU monitoring.  2.Sepsis-ABX as per ID.  3.Acute anemia-CT abd and pelvvis, 2 units PRBC.  4.ESRD-HD as per renal.  5.DM-Insulin.  6.Hypotension-midodrine.  7.Oral ca with dec po intake-CT neck with IV contrast.  8.GI and DVT prophylaxis.

## 2020-08-02 NOTE — PROGRESS NOTE ADULT - SUBJECTIVE AND OBJECTIVE BOX
Interval Events:    on po diet  off pressor  serum glucose with mild hypoglycemia  not requiring sliding scale    Allergies    sulfADIAZINE (Angioedema)    Intolerances      Endocrine/Metabolic Medications:  glucagon  Injectable 1 milliGRAM(s) IntraMuscular once PRN  insulin lispro (HumaLOG) corrective regimen sliding scale   SubCutaneous Before meals and at bedtime      Vital Signs Last 24 Hrs  T(C): 35.8 (02 Aug 2020 11:30), Max: 36.7 (01 Aug 2020 18:32)  T(F): 96.5 (02 Aug 2020 11:30), Max: 98 (01 Aug 2020 18:32)  HR: 60 (02 Aug 2020 11:30) (60 - 102)  BP: 149/78 (02 Aug 2020 11:30) (82/58 - 208/81)  BP(mean): 95 (02 Aug 2020 11:30) (62 - 115)  RR: 15 (02 Aug 2020 11:30) (9 - 23)  SpO2: 100% (02 Aug 2020 11:30) (73% - 100%)      PHYSICAL EXAM  Constitutional:    NC/AT:    HEENT:    Neck:  No JVD  Respiratory:  reduced breath sounds b/l bases    Cardiovascular:  RR   Gastrointestinal: Soft     Extremities: without cyanosis      Neurological:  non focal  LABS                        6.5    10.57 )-----------( 247      ( 02 Aug 2020 09:51 )             19.6                               133    |  97     |  10                  Calcium: 8.3   / iCa: x      (08-02 @ 06:35)    ----------------------------<  62        Magnesium: 1.7                              3.4     |  29     |  2.75             Phosphorous: 2.0      TPro  4.8    /  Alb  1.5    /  TBili  0.5    /  DBili  x      /  AST  15     /  ALT  13     /  AlkPhos  173    02 Aug 2020 06:35    CAPILLARY BLOOD GLUCOSE      POCT Blood Glucose.: 201 mg/dL (02 Aug 2020 06:31)  POCT Blood Glucose.: 73 mg/dL (02 Aug 2020 05:11)  POCT Blood Glucose.: 87 mg/dL (01 Aug 2020 21:53)        Assesment/plan          68yo female with multiple comorbidities including h/o HTN, DM type 2, ESRD on HD admitted with AV fistula swelling and pain    Endocrinology consulted for glycemic management    DM type 2  complicated by ESRD on HD, sepsis- bacteremia, requiring pressor current admit  NH regimen:  humalog sliding scale    recommendations:  fasting , prandial controlled  serum glucose with hypoglycemia    - stop low dose sliding scale  - reassess based on requirements  - goal inpatient glucose range: 140-180mg/dl    sepsis  bacteremia  on vancomycin  s/p AV repair/ligation  ID on board  requiring pressor    ESRD on HD  cautious insulin dosing  may need modified sliding scale    Discussed with primary team  Please call - Endocrine- Dr Mónica Cm as needed    Time spent evaluating patient including coordination of care- 35mins.

## 2020-08-02 NOTE — PROGRESS NOTE ADULT - SUBJECTIVE AND OBJECTIVE BOX
Patient is a 69y old  Female who presents with a chief complaint of Painful swelling at AV fistula. (02 Aug 2020 00:37)    pt seen in icu [ x ], reg med floor [   ], bed [ x ], chair at bedside [   ], a+o x3 [ x ], lethargic [  ],    nad [ x ]      Allergies    sulfADIAZINE (Angioedema)        Vitals    T(F): 97.1 (08-02-20 @ 04:30), Max: 98 (08-01-20 @ 18:32)  HR: 70 (08-02-20 @ 06:00) (63 - 99)  BP: 126/69 (08-02-20 @ 06:00) (82/58 - 208/81)  RR: 15 (08-02-20 @ 06:00) (9 - 31)  SpO2: 99% (08-02-20 @ 06:00) (73% - 100%)  Wt(kg): --  CAPILLARY BLOOD GLUCOSE      POCT Blood Glucose.: 73 mg/dL (02 Aug 2020 05:11)      Labs                          8.5    14.60 )-----------( 291      ( 01 Aug 2020 06:04 )             25.8       08-01    133<L>  |  95<L>  |  21<H>  ----------------------------<  86  4.0   |  29  |  4.56<H>    Ca    8.9      01 Aug 2020 06:04  Phos  3.8     08-01  Mg     1.8     08-01    TPro  5.5<L>  /  Alb  1.4<L>  /  TBili  0.5  /  DBili  x   /  AST  14  /  ALT  17  /  AlkPhos  209<H>  08-01            .Blood Blood-Peripheral  07-31 @ 08:06   Growth in aerobic bottle: Gram Positive Cocci in Clusters  Growth in anaerobic bottle: Gram Positive Cocci in Clusters  --    Growth in aerobic bottle: Gram Positive Cocci in Clusters  Growth in anaerobic bottle: Gram Positive Cocci in Clusters      .Surgical Swab left arm av graft C&S  07-31 @ 06:41   No growth  --  --      .Urine Clean Catch (Midstream)  07-30 @ 01:34   >=3 organisms. Probable collection contamination.  --  --      .Blood Blood-Peripheral  07-29 @ 23:02   Growth in aerobic and anaerobic bottles: Staphylococcus epidermidis  "Due to technical problems, Proteus sp. will Not be reported as part of  the BCID panel until further notice"  ***Blood Panel PCR results on this specimen are available  approximately 3 hours after the Gram stain result.***  Gram stain, PCR, and/or culture results may not always  correspond due to difference in methodologies.  ************************************************************  This PCR assay was performed using [employer].  The following targets are tested for: Enterococcus,  vancomycin resistant enterococci, Listeria monocytogenes,  coagulase negative staphylococci, S. aureus,  methicillin resistant S. aureus, Streptococcus agalactiae  (Group B), S. pneumoniae, S. pyogenes (Group A),  Acinetobacter baumannii, Enterobacter cloacae, E. coli,  Klebsiella oxytoca, K. pneumoniae, Proteus sp.,  Serratia marcescens, Haemophilus influenzae,  Neisseria meningitidis, Pseudomonas aeruginosa, Candida  albicans, C. glabrata, C krusei, C parapsilosis,  C. tropicalis and the KPC resistance gene.  --  Blood Culture PCR          Radiology Results      Meds    MEDICATIONS  (STANDING):  chlorhexidine 2% Cloths 1 Application(s) Topical <User Schedule>  insulin lispro (HumaLOG) corrective regimen sliding scale   SubCutaneous Before meals and at bedtime  midodrine 10 milliGRAM(s) Oral every 8 hours  norepinephrine Infusion 0.1 MICROgram(s)/kG/Min (3.78 mL/Hr) IV Continuous <Continuous>  pantoprazole    Tablet 40 milliGRAM(s) Oral before breakfast  piperacillin/tazobactam IVPB.. 3.375 Gram(s) IV Intermittent every 12 hours      MEDICATIONS  (PRN):  glucagon  Injectable 1 milliGRAM(s) IntraMuscular once PRN Glucose <70 milliGRAM(s)/deciLiter  HYDROmorphone  Injectable 1 milliGRAM(s) IV Push every 6 hours PRN Severe Pain (7 - 10)      Physical Exam    Neuro :  no focal deficits  Respiratory: CTA B/L  CV: RRR, S1S2, no murmurs,   Abdominal: Soft, NT, ND +BS,  Extremities: No edema, + peripheral pulses, left ue dressing cdi, distal forearm and hand edema,      ASSESSMENT      Infected prosthetic vascular graft LUE  Pseudoaneurysm of LUE arteriovenous graft  Cellulitis of left upper extremity    uti  h/o ESRD on hd,   DM (diabetes mellitus)  Vitamin D deficiency  Hyperphosphatemia  Hypertension  S/P bunionectomy  hemorrhoidectomy  oral cancer  left AVF (arteriovenous fistula)      PLAN      s/p Excision, infected graft, extremity and Ligation of arteriovenous fistula or access graft of upper extremity 7/30/20  vasc surg f/u    cont pain control  cont wound care  gi/dvt prophylaxis  cont zosyn,   cont  vanco with hd  id f/u   blood cx with  Staphylococcus epidermidis noted above  f/u rept blood cx   f/u echo  s/p right femoral shiley placement  renal f/u   dialysis held yesterday 2nd to hypotension   pt for hd today  cont hd as per renal   pt on phenylephrine  hgba1c 5.4   endo f/u   hss  cont current meds   mgmt as per icu Patient is a 69y old  Female who presents with a chief complaint of Painful swelling at AV fistula. (02 Aug 2020 00:37)    pt seen in icu [ x ], reg med floor [   ], bed [ x ], chair at bedside [   ], a+o x3 [ x ], lethargic [  ],    nad [ x ]      Allergies    sulfADIAZINE (Angioedema)        Vitals    T(F): 97.1 (08-02-20 @ 04:30), Max: 98 (08-01-20 @ 18:32)  HR: 70 (08-02-20 @ 06:00) (63 - 99)  BP: 126/69 (08-02-20 @ 06:00) (82/58 - 208/81)  RR: 15 (08-02-20 @ 06:00) (9 - 31)  SpO2: 99% (08-02-20 @ 06:00) (73% - 100%)  Wt(kg): --  CAPILLARY BLOOD GLUCOSE      POCT Blood Glucose.: 73 mg/dL (02 Aug 2020 05:11)      Labs                          8.5    14.60 )-----------( 291      ( 01 Aug 2020 06:04 )             25.8       08-01    133<L>  |  95<L>  |  21<H>  ----------------------------<  86  4.0   |  29  |  4.56<H>    Ca    8.9      01 Aug 2020 06:04  Phos  3.8     08-01  Mg     1.8     08-01    TPro  5.5<L>  /  Alb  1.4<L>  /  TBili  0.5  /  DBili  x   /  AST  14  /  ALT  17  /  AlkPhos  209<H>  08-01            .Blood Blood-Peripheral  07-31 @ 08:06   Growth in aerobic bottle: Gram Positive Cocci in Clusters  Growth in anaerobic bottle: Gram Positive Cocci in Clusters  --    Growth in aerobic bottle: Gram Positive Cocci in Clusters  Growth in anaerobic bottle: Gram Positive Cocci in Clusters      .Surgical Swab left arm av graft C&S  07-31 @ 06:41   No growth  --  --      .Urine Clean Catch (Midstream)  07-30 @ 01:34   >=3 organisms. Probable collection contamination.  --  --      .Blood Blood-Peripheral  07-29 @ 23:02   Growth in aerobic and anaerobic bottles: Staphylococcus epidermidis  "Due to technical problems, Proteus sp. will Not be reported as part of  the BCID panel until further notice"  ***Blood Panel PCR results on this specimen are available  approximately 3 hours after the Gram stain result.***  Gram stain, PCR, and/or culture results may not always  correspond due to difference in methodologies.  ************************************************************  This PCR assay was performed using [employer].  The following targets are tested for: Enterococcus,  vancomycin resistant enterococci, Listeria monocytogenes,  coagulase negative staphylococci, S. aureus,  methicillin resistant S. aureus, Streptococcus agalactiae  (Group B), S. pneumoniae, S. pyogenes (Group A),  Acinetobacter baumannii, Enterobacter cloacae, E. coli,  Klebsiella oxytoca, K. pneumoniae, Proteus sp.,  Serratia marcescens, Haemophilus influenzae,  Neisseria meningitidis, Pseudomonas aeruginosa, Candida  albicans, C. glabrata, C krusei, C parapsilosis,  C. tropicalis and the KPC resistance gene.  --  Blood Culture PCR      < from: Transthoracic Echocardiogram (07.31.20 @ 08:49) >  CONCLUSIONS:  1. Normal mitral valve.  2. Normal trileaflet aortic valve. Mild aortic  regurgitation.  3. Aortic Root: 2.7 cm.  4. Normal left atrium.  LA volume index = 26 cc/m2.  5. Mild concentric left ventricular hypertrophy.  6. Hyperdynamic left ventricular systolic function (EF  >70%).  7. Grade I diastolic dysfunction (Impaired relaxation).  8. Normal right atrium.  9. Normal right ventricular size and systolic function  (TAPSE  2.1cm).  10. Unable to estimate RVSP.  11. Normal tricuspid valve.  12. Normal pulmonic valve.  13. Normal pericardium with no pericardial effusion.    < end of copied text >        Radiology Results      Meds    MEDICATIONS  (STANDING):  chlorhexidine 2% Cloths 1 Application(s) Topical <User Schedule>  insulin lispro (HumaLOG) corrective regimen sliding scale   SubCutaneous Before meals and at bedtime  midodrine 10 milliGRAM(s) Oral every 8 hours  norepinephrine Infusion 0.1 MICROgram(s)/kG/Min (3.78 mL/Hr) IV Continuous <Continuous>  pantoprazole    Tablet 40 milliGRAM(s) Oral before breakfast  piperacillin/tazobactam IVPB.. 3.375 Gram(s) IV Intermittent every 12 hours      MEDICATIONS  (PRN):  glucagon  Injectable 1 milliGRAM(s) IntraMuscular once PRN Glucose <70 milliGRAM(s)/deciLiter  HYDROmorphone  Injectable 1 milliGRAM(s) IV Push every 6 hours PRN Severe Pain (7 - 10)      Physical Exam    Neuro :  no focal deficits  Respiratory: CTA B/L  CV: RRR, S1S2, no murmurs,   Abdominal: Soft, NT, ND +BS,  Extremities: No edema, + peripheral pulses, left ue dressing cdi, distal forearm and hand edema,      ASSESSMENT      Infected prosthetic vascular graft LUE  Pseudoaneurysm of LUE arteriovenous graft  Cellulitis of left upper extremity    uti  h/o ESRD on hd,   DM (diabetes mellitus)  Vitamin D deficiency  Hyperphosphatemia  Hypertension  S/P bunionectomy  hemorrhoidectomy  oral cancer  left AVF (arteriovenous fistula)      PLAN      s/p Excision, infected graft, extremity and Ligation of arteriovenous fistula or access graft of upper extremity 7/30/20  vasc surg f/u    cont pain control  cont wound care  gi/dvt prophylaxis  cont zosyn,   cont  vanco with hd  id f/u   blood cx with  Staphylococcus epidermidis noted above  f/u rept blood cx   echo with Normal mitral valve. Normal trileaflet aortic valve. Mild aortic regurgitation.  Hyperdynamic left ventricular systolic function (EF >70%). Grade I diastolic dysfunction noted above  s/p right femoral shiley placement  renal f/u   cont hd as per renal   pt off pressors  hgba1c 5.4   endo f/u   hss  cont current meds   mgmt as per icu

## 2020-08-02 NOTE — PROGRESS NOTE ADULT - ASSESSMENT
ESRD  Acute drop in H/H , follow CT of right thigh and CT neck WITH CONTRAST  Blood transfusion and follow H/H. Follow with IV fluids as per ICU.  Hyperdynamic left ventricular function, EF 70%, and as per cardiology underfill ventricle , in need for fluids.  Dialysis tomorrow.  Add Epogen        Culture Results REPEAT:  Growth in aerobic bottle: Gram Positive Cocci in Clusters  Growth in anaerobic bottle: Gram Positive Cocci in Clusters (07.31.20 @ 08:06)  Patient on Vancomycin in dialysis.

## 2020-08-02 NOTE — PROGRESS NOTE ADULT - ASSESSMENT
68 yo F ESRD on HD, HTN, DM, Oral ca came in L Arm AVG infection coag neg staph and pseudoaneurysm, pt was taken to OR for AVG excision and ligation. Pt was brought to ICU for post op monitoring.       Assessment:  Coag neg staph bacteremia   AVG infection s/p excision   AVG pseudoaneurysm s/p excision and ligation 7/30/20  ESRD on HD, last HD 8/1/20  DM  Hypoglycemia resolved    HTN  Anemia of chronic disease     CNS  # No issues:   - AAOx3 at baseline  - Pain control with hydromorphone     CVS  # HTN  - Holding anti hypertensive meds   -Hypotension  -c/w NE and Midodrine     Resp  # No issue   -lungs clear on cxr   -Incentive spirometer     GI  - Protonix for GI ppx    - C/w renal diet     Renal  # ESRD on HD last HD was on 8/1/20, Vancomycin 1 gm intra-dialysis  - has right femoral Shiley   - Monitor electrolytes    - f/u Dr. Flores    ID  # Bacteremia (coag neg staph) likely from AV graft infection   - s/p removal of AV graft on 7/30   - Currently on vanc and zosyn   - last dose of vanc was on 8/1/20   - Lactate 2.3  - f/u pre dialysis vancomycin level, given vancomycin 1gm intra dialysis if vanc level <20   - Echo G1DD, EF 70%   - F/u repeat blood culture   - ID Dr. Ludy Duran   # AVG pseudoaneurysm   - s/p AVG removal and ligation 7/30/20  - monitor for sings of bleeding   - cbc q6  - Check pulses q 2hrs   - vascular surgeon Dr. Manzanares     Heme onco  # Anemia likely from CKD  - epogen during dialysis   - monitor cbc     Endo  # hx of DM on hss at home   - target CBG >140 and < 180  - FS achs    Prophylaxis   - Dvt : will hold chemical dvt ppx for now given concern for bleeding, on SCD prophylaxis *   - GI : Protonix 68 yo F ESRD on HD, HTN, DM, Oral ca came in L Arm AVG infection coag neg staph and pseudoaneurysm, pt was taken to OR for AVG excision and ligation. Pt was brought to ICU for post op monitoring.       Assessment:  Coag neg staph bacteremia   AVG infection s/p excision   AVG pseudoaneurysm s/p excision and ligation 7/30/20  ESRD on HD, last HD 8/1/20  DM  Hypoglycemia resolved    HTN  Anemia of chronic disease     CNS  # No issues:   - AAOx3 at baseline  - Pain control with hydromorphone     CVS  # HTN  - Holding anti hypertensive meds   -Hypotension  -c/w NE and Midodrine     Resp  # No issue   -lungs clear on cxr   -CT neck shows multiple lung nodules   -Incentive spirometer     GI  - Protonix for GI ppx    - C/w renal diet     Renal  # ESRD on HD last HD was on 8/1/20, Vancomycin 1 gm intra-dialysis  - has right femoral Shiley   - Monitor electrolytes    - f/u Dr. Flores    ID  # Bacteremia (coag neg staph) likely from AV graft infection   - s/p removal of AV graft on 7/30   - Currently on vanc and zosyn   - last dose of vanc was on 8/1/20   - Lactate 1.5 (08/01)  - f/u pre dialysis vancomycin level, given vancomycin 1gm intra dialysis if vanc level <20   - Echo G1DD, EF 70%   - repeat blood culture shows gram positive bacteria in clusters (07/31)   - ID Dr. Ludy Duran   # AVG pseudoaneurysm   - s/p AVG removal and ligation 7/30/20  - monitor for sings of bleeding   - Blood clots are noticed from surgical site ( surgery informed )  - cbc q6  - Check pulses q 2hrs   - vascular surgeon Dr. Manzanares     Heme onco  # Anemia likely from CKD  - Hb today is 6.5  - 1PRBCs was transfused   - epogen during dialysis   - monitor cbc     Endo  # hx of DM on hss at home   - target CBG >140 and < 180  - FS achs    Prophylaxis   - Dvt : will hold chemical dvt ppx for now given concern for bleeding, on SCD prophylaxis *   - GI : Protonix

## 2020-08-03 LAB
ALBUMIN SERPL ELPH-MCNC: 1.5 G/DL — LOW (ref 3.5–5)
ALP SERPL-CCNC: 211 U/L — HIGH (ref 40–120)
ALT FLD-CCNC: 12 U/L DA — SIGNIFICANT CHANGE UP (ref 10–60)
ANION GAP SERPL CALC-SCNC: 10 MMOL/L — SIGNIFICANT CHANGE UP (ref 5–17)
AST SERPL-CCNC: 18 U/L — SIGNIFICANT CHANGE UP (ref 10–40)
BASOPHILS # BLD AUTO: 0.03 K/UL — SIGNIFICANT CHANGE UP (ref 0–0.2)
BASOPHILS # BLD AUTO: 0.07 K/UL — SIGNIFICANT CHANGE UP (ref 0–0.2)
BASOPHILS NFR BLD AUTO: 0.2 % — SIGNIFICANT CHANGE UP (ref 0–2)
BASOPHILS NFR BLD AUTO: 0.6 % — SIGNIFICANT CHANGE UP (ref 0–2)
BILIRUB SERPL-MCNC: 0.8 MG/DL — SIGNIFICANT CHANGE UP (ref 0.2–1.2)
BUN SERPL-MCNC: 12 MG/DL — SIGNIFICANT CHANGE UP (ref 7–18)
CALCIUM SERPL-MCNC: 8.5 MG/DL — SIGNIFICANT CHANGE UP (ref 8.4–10.5)
CHLORIDE SERPL-SCNC: 96 MMOL/L — SIGNIFICANT CHANGE UP (ref 96–108)
CO2 SERPL-SCNC: 27 MMOL/L — SIGNIFICANT CHANGE UP (ref 22–31)
CREAT SERPL-MCNC: 3.65 MG/DL — HIGH (ref 0.5–1.3)
CULTURE RESULTS: SIGNIFICANT CHANGE UP
EOSINOPHIL # BLD AUTO: 0.07 K/UL — SIGNIFICANT CHANGE UP (ref 0–0.5)
EOSINOPHIL # BLD AUTO: 0.1 K/UL — SIGNIFICANT CHANGE UP (ref 0–0.5)
EOSINOPHIL NFR BLD AUTO: 0.5 % — SIGNIFICANT CHANGE UP (ref 0–6)
EOSINOPHIL NFR BLD AUTO: 0.8 % — SIGNIFICANT CHANGE UP (ref 0–6)
GLUCOSE BLDC GLUCOMTR-MCNC: 100 MG/DL — HIGH (ref 70–99)
GLUCOSE BLDC GLUCOMTR-MCNC: 100 MG/DL — HIGH (ref 70–99)
GLUCOSE BLDC GLUCOMTR-MCNC: 89 MG/DL — SIGNIFICANT CHANGE UP (ref 70–99)
GLUCOSE SERPL-MCNC: 84 MG/DL — SIGNIFICANT CHANGE UP (ref 70–99)
HCT VFR BLD CALC: 26.9 % — LOW (ref 34.5–45)
HCT VFR BLD CALC: 27.2 % — LOW (ref 34.5–45)
HCT VFR BLD CALC: 28.2 % — LOW (ref 34.5–45)
HGB BLD-MCNC: 9.1 G/DL — LOW (ref 11.5–15.5)
HGB BLD-MCNC: 9.3 G/DL — LOW (ref 11.5–15.5)
HGB BLD-MCNC: 9.6 G/DL — LOW (ref 11.5–15.5)
IMM GRANULOCYTES NFR BLD AUTO: 1.1 % — SIGNIFICANT CHANGE UP (ref 0–1.5)
IMM GRANULOCYTES NFR BLD AUTO: 1.4 % — SIGNIFICANT CHANGE UP (ref 0–1.5)
LYMPHOCYTES # BLD AUTO: 1.06 K/UL — SIGNIFICANT CHANGE UP (ref 1–3.3)
LYMPHOCYTES # BLD AUTO: 1.35 K/UL — SIGNIFICANT CHANGE UP (ref 1–3.3)
LYMPHOCYTES # BLD AUTO: 11 % — LOW (ref 13–44)
LYMPHOCYTES # BLD AUTO: 8.2 % — LOW (ref 13–44)
MAGNESIUM SERPL-MCNC: 1.8 MG/DL — SIGNIFICANT CHANGE UP (ref 1.6–2.6)
MCHC RBC-ENTMCNC: 29.8 PG — SIGNIFICANT CHANGE UP (ref 27–34)
MCHC RBC-ENTMCNC: 30.2 PG — SIGNIFICANT CHANGE UP (ref 27–34)
MCHC RBC-ENTMCNC: 30.3 PG — SIGNIFICANT CHANGE UP (ref 27–34)
MCHC RBC-ENTMCNC: 33.8 GM/DL — SIGNIFICANT CHANGE UP (ref 32–36)
MCHC RBC-ENTMCNC: 34 GM/DL — SIGNIFICANT CHANGE UP (ref 32–36)
MCHC RBC-ENTMCNC: 34.2 GM/DL — SIGNIFICANT CHANGE UP (ref 32–36)
MCV RBC AUTO: 88.2 FL — SIGNIFICANT CHANGE UP (ref 80–100)
MCV RBC AUTO: 88.6 FL — SIGNIFICANT CHANGE UP (ref 80–100)
MCV RBC AUTO: 88.7 FL — SIGNIFICANT CHANGE UP (ref 80–100)
MONOCYTES # BLD AUTO: 0.63 K/UL — SIGNIFICANT CHANGE UP (ref 0–0.9)
MONOCYTES # BLD AUTO: 0.89 K/UL — SIGNIFICANT CHANGE UP (ref 0–0.9)
MONOCYTES NFR BLD AUTO: 4.9 % — SIGNIFICANT CHANGE UP (ref 2–14)
MONOCYTES NFR BLD AUTO: 7.3 % — SIGNIFICANT CHANGE UP (ref 2–14)
NEUTROPHILS # BLD AUTO: 10.93 K/UL — HIGH (ref 1.8–7.4)
NEUTROPHILS # BLD AUTO: 9.67 K/UL — HIGH (ref 1.8–7.4)
NEUTROPHILS NFR BLD AUTO: 79.2 % — HIGH (ref 43–77)
NEUTROPHILS NFR BLD AUTO: 84.8 % — HIGH (ref 43–77)
NRBC # BLD: 0 /100 WBCS — SIGNIFICANT CHANGE UP (ref 0–0)
PHOSPHATE SERPL-MCNC: 2.3 MG/DL — LOW (ref 2.5–4.5)
PLATELET # BLD AUTO: 291 K/UL — SIGNIFICANT CHANGE UP (ref 150–400)
PLATELET # BLD AUTO: 303 K/UL — SIGNIFICANT CHANGE UP (ref 150–400)
PLATELET # BLD AUTO: 307 K/UL — SIGNIFICANT CHANGE UP (ref 150–400)
POTASSIUM SERPL-MCNC: 3.5 MMOL/L — SIGNIFICANT CHANGE UP (ref 3.5–5.3)
POTASSIUM SERPL-SCNC: 3.5 MMOL/L — SIGNIFICANT CHANGE UP (ref 3.5–5.3)
PROT SERPL-MCNC: 5.3 G/DL — LOW (ref 6–8.3)
RBC # BLD: 3.05 M/UL — LOW (ref 3.8–5.2)
RBC # BLD: 3.07 M/UL — LOW (ref 3.8–5.2)
RBC # BLD: 3.18 M/UL — LOW (ref 3.8–5.2)
RBC # FLD: 17.5 % — HIGH (ref 10.3–14.5)
RBC # FLD: 17.8 % — HIGH (ref 10.3–14.5)
RBC # FLD: 18.6 % — HIGH (ref 10.3–14.5)
SODIUM SERPL-SCNC: 133 MMOL/L — LOW (ref 135–145)
SPECIMEN SOURCE: SIGNIFICANT CHANGE UP
VANCOMYCIN TROUGH SERPL-MCNC: 36.3 UG/ML — CRITICAL HIGH (ref 10–20)
WBC # BLD: 11.07 K/UL — HIGH (ref 3.8–10.5)
WBC # BLD: 12.22 K/UL — HIGH (ref 3.8–10.5)
WBC # BLD: 12.9 K/UL — HIGH (ref 3.8–10.5)
WBC # FLD AUTO: 11.07 K/UL — HIGH (ref 3.8–10.5)
WBC # FLD AUTO: 12.22 K/UL — HIGH (ref 3.8–10.5)
WBC # FLD AUTO: 12.9 K/UL — HIGH (ref 3.8–10.5)

## 2020-08-03 RX ORDER — SENNA PLUS 8.6 MG/1
2 TABLET ORAL AT BEDTIME
Refills: 0 | Status: DISCONTINUED | OUTPATIENT
Start: 2020-08-03 | End: 2020-08-04

## 2020-08-03 RX ORDER — POLYETHYLENE GLYCOL 3350 17 G/17G
17 POWDER, FOR SOLUTION ORAL DAILY
Refills: 0 | Status: DISCONTINUED | OUTPATIENT
Start: 2020-08-03 | End: 2020-08-04

## 2020-08-03 RX ORDER — HYDRALAZINE HCL 50 MG
5 TABLET ORAL ONCE
Refills: 0 | Status: COMPLETED | OUTPATIENT
Start: 2020-08-03 | End: 2020-08-03

## 2020-08-03 RX ORDER — HYDROMORPHONE HYDROCHLORIDE 2 MG/ML
0.5 INJECTION INTRAMUSCULAR; INTRAVENOUS; SUBCUTANEOUS EVERY 6 HOURS
Refills: 0 | Status: DISCONTINUED | OUTPATIENT
Start: 2020-08-03 | End: 2020-08-07

## 2020-08-03 RX ADMIN — MORPHINE SULFATE 2 MILLIGRAM(S): 50 CAPSULE, EXTENDED RELEASE ORAL at 19:00

## 2020-08-03 RX ADMIN — HYDROMORPHONE HYDROCHLORIDE 0.5 MILLIGRAM(S): 2 INJECTION INTRAMUSCULAR; INTRAVENOUS; SUBCUTANEOUS at 12:04

## 2020-08-03 RX ADMIN — PIPERACILLIN AND TAZOBACTAM 25 GRAM(S): 4; .5 INJECTION, POWDER, LYOPHILIZED, FOR SOLUTION INTRAVENOUS at 05:18

## 2020-08-03 RX ADMIN — CHLORHEXIDINE GLUCONATE 1 APPLICATION(S): 213 SOLUTION TOPICAL at 05:19

## 2020-08-03 RX ADMIN — SENNA PLUS 2 TABLET(S): 8.6 TABLET ORAL at 22:42

## 2020-08-03 RX ADMIN — HYDROMORPHONE HYDROCHLORIDE 0.5 MILLIGRAM(S): 2 INJECTION INTRAMUSCULAR; INTRAVENOUS; SUBCUTANEOUS at 11:49

## 2020-08-03 RX ADMIN — HYDROMORPHONE HYDROCHLORIDE 1 MILLIGRAM(S): 2 INJECTION INTRAMUSCULAR; INTRAVENOUS; SUBCUTANEOUS at 04:28

## 2020-08-03 RX ADMIN — Medication 5 MILLIGRAM(S): at 06:53

## 2020-08-03 RX ADMIN — PANTOPRAZOLE SODIUM 40 MILLIGRAM(S): 20 TABLET, DELAYED RELEASE ORAL at 06:05

## 2020-08-03 RX ADMIN — HYDROMORPHONE HYDROCHLORIDE 0.5 MILLIGRAM(S): 2 INJECTION INTRAMUSCULAR; INTRAVENOUS; SUBCUTANEOUS at 20:22

## 2020-08-03 RX ADMIN — HYDROMORPHONE HYDROCHLORIDE 1 MILLIGRAM(S): 2 INJECTION INTRAMUSCULAR; INTRAVENOUS; SUBCUTANEOUS at 04:43

## 2020-08-03 RX ADMIN — HYDROMORPHONE HYDROCHLORIDE 0.5 MILLIGRAM(S): 2 INJECTION INTRAMUSCULAR; INTRAVENOUS; SUBCUTANEOUS at 20:44

## 2020-08-03 RX ADMIN — ERYTHROPOIETIN 4000 UNIT(S): 10000 INJECTION, SOLUTION INTRAVENOUS; SUBCUTANEOUS at 10:21

## 2020-08-03 RX ADMIN — POLYETHYLENE GLYCOL 3350 17 GRAM(S): 17 POWDER, FOR SOLUTION ORAL at 11:51

## 2020-08-03 NOTE — PROGRESS NOTE ADULT - ASSESSMENT
69 yr old F from [facility], with PMH of HTN, ESRD, DM, Hyperphosphatemia,  Oral cancer(s/p excision) , comes to the ED  with left arm AV fistula pain and swelling x1 week,sepsis due to staph epi, s/p ligation of AVF, acute anemia.  1.ICU monitoring.  2.Sepsis-ABX as per ID.  3.Acute anemia-s/p  PRBC.  4.ESRD-HD as per renal.  5.DM-Insulin.  6.Hypotension- resolved.  7.Oral ca with dec po intake-CT neck with IV contrast as above.  8.GI and DVT prophylaxis.

## 2020-08-03 NOTE — SWALLOW BEDSIDE ASSESSMENT ADULT - COMMENTS
Pt received supine in bed, HOB elevated to 45 degrees. Pt c/o difficulty chewing and pain and "choking" when swallowing.

## 2020-08-03 NOTE — PROGRESS NOTE ADULT - ASSESSMENT
70 yo F with PMHx of ESRD on HD, HTN, DM presented to the ED with L Arm pain, found to have pseudoaneurysm and AVG infection coag neg staph. Patient was taken to OR 7/30/20 for AVG excision and ligation. Pt was brought to ICU for septic shock and post op monitoring.       Septic shock resolved   Coag neg staph bacteremia   AVG infection s/p excision and ligation 7/30/20  AVG pseudoaneurysm s/p excision and ligation 7/30/20  ESRD on HD, last HD 8/3/20  DM  Hypoglycemia resolved    HTN  Anemia of chronic disease     CNS  # No issues  - AAOx3 at baseline  - Pain control with hydromorphone     CVS  # HTN  - Holding anti hypertensive meds   - Hypotension resolved for now, patient gets hypotensive post dialysis  - TTE     Resp  # No issue   -lungs clear on CXR and on ascultation   -CT neck shows multiple lung nodules   -Incentive spirometer     GI  - Protonix for GI ppx    - Patient w/ dysphagia, CT of neck with no significant findings   - Speech and swallow eval   - Diet changed to soft die for now      Renal  # ESRD on HD last HD was today 8/3/20, Vancomycin hold for now, Vanco trough 36.3  - Has right femoral Shiley   - Monitor electrolytes    - f/u Dr. Flores    ID  # Bacteremia (coag neg staph) likely from AV graft infection   - s/p removal of AV graft on 7/30  - D/c Zosyn   - Last dose of Vanco was on 8/1/20, holding Vanco for now    - F/u pre dialysis vancomycin level, give vancomycin 1gm intra dialysis if vanc level <20   - Echo G1DD, EF 70%   - Second set of blood cultures 7/31/20 shows GPC in clusters   - F/u repeat blood culture sent on 8/2/20  - ID Dr. Ludy Duran   # AVG pseudoaneurysm   - s/p AVG removal and ligation 7/30/20  - monitor for sings of bleeding   - Blood clots are noticed from surgical site ( surgery informed )  - cbc q 8 hrs   - Check pulses q 2hrs   - F/u vascular surgeon Dr. Manzanares     Heme onco  # Anemia of chronic disease ,ESRD  - Hb today 9.1 (8/3/20) s/p 1PRBC transfusion   - epogen during dialysis   - Monitor cbc     Endo  # Hx of DM on hss at home   - Target CBG >140 and < 180  - A1C 5.4  - FS achs    Prophylaxis   - Holding chemical DVT ppx for now given concern of bleeding, on SCD prophylaxis *   - GI : Protonix 68 yo F with PMHx of ESRD on HD, HTN, DM presented to the ED with L Arm pain, found to have pseudoaneurysm and AVG infection coag neg staph. Patient was taken to OR 7/30/20 for AVG excision and ligation. Pt was brought to ICU for septic shock and post op monitoring.       Septic shock resolved   Coag neg staph bacteremia   AVG infection s/p excision and ligation 7/30/20  AVG pseudoaneurysm s/p excision and ligation 7/30/20  ESRD on HD, last HD 8/3/20  DM  Hypoglycemia resolved    HTN  Anemia of chronic disease     CNS  # No issues  - AAOx3 at baseline  - Pain control with hydromorphone     CVS  # HTN  - Holding anti hypertensive meds   - Hypotension resolved for now, patient gets hypotensive post dialysis  - TTE     Resp  # No issue   -lungs clear on CXR and on ascultation   -CT neck shows multiple lung nodules   -Incentive spirometer     GI  - Protonix for GI ppx    - Patient w/ dysphagia, CT of neck with no significant findings   - Speech and swallow eval   - Diet changed to soft die for now  - CT abd/pelvis No retroperitoneal hematoma. Large amount of stool within the rectum.    Renal  # ESRD on HD last HD was today 8/3/20, Vancomycin hold for now, Vanco trough 36.3  - Has right femoral Shiley   - Monitor electrolytes    - f/u Dr. Flores    ID  # Bacteremia (coag neg staph) likely from AV graft infection   - s/p removal of AV graft on 7/30  - D/c Zosyn   - Last dose of Vanco was on 8/1/20, holding Vanco for now    - F/u pre dialysis vancomycin level, give vancomycin 1gm intra dialysis if vanc level <20   - Echo G1DD, EF 70%   - Second set of blood cultures 7/31/20 shows GPC in clusters   - F/u repeat blood culture sent on 8/2/20  - ID Dr. Ludy Duran   # AVG pseudoaneurysm   - s/p AVG removal and ligation 7/30/20  - monitor for sings of bleeding   - Blood clots are noticed from surgical site ( surgery informed )  - cbc q 8 hrs   - Check pulses q 2hrs   - F/u vascular surgeon Dr. Manzanares     Heme onco  # Anemia of chronic disease ,ESRD  - Hb today 9.1 (8/3/20) s/p 1PRBC transfusion   - epogen during dialysis   - Monitor cbc     Endo  # Hx of DM on hss at home   - Target CBG >140 and < 180  - A1C 5.4  - FS achs    Prophylaxis   - Holding chemical DVT ppx for now given concern of bleeding, on SCD prophylaxis *   - GI : Protonix 68 yo F with PMHx of ESRD on HD, HTN, DM presented to the ED with L Arm pain, found to have pseudoaneurysm and AVG infection coag neg staph. Patient was taken to OR 7/30/20 for AVG excision and ligation. Pt was brought to ICU for septic shock and post op monitoring.       Septic shock resolved   Coag neg staph bacteremia   AVG infection s/p excision and ligation 7/30/20  AVG pseudoaneurysm s/p excision and ligation 7/30/20  ESRD on HD, last HD 8/3/20  DM  Hypoglycemia resolved    HTN  Anemia of chronic disease     CNS  # No issues  - AAOx3 at baseline  - Pain control with hydromorphone     CVS  # HTN  - Holding anti hypertensive meds   - Hypotension resolved for now, patient gets hypotensive post dialysis  - TTE     Resp  # No issue   -lungs clear on CXR and on ascultation   -CT neck shows multiple lung nodules   -Incentive spirometer     GI  - Protonix for GI ppx    - Patient w/ dysphagia, CT of neck with no significant findings   - Speech and swallow eval   - Diet changed to soft die for now  - CT abd/pelvis No retroperitoneal hematoma. Large amount of stool within the rectum.  - Start senna and miralax     Renal  # ESRD on HD last HD was today 8/3/20, Vancomycin hold for now, Vanco trough 36.3  - Has right femoral Shiley   - Monitor electrolytes    - f/u Dr. Flores    ID  # Bacteremia (coag neg staph) likely from AV graft infection   - s/p removal of AV graft on 7/30  - D/c Zosyn   - Last dose of Vanco was on 8/1/20, holding Vanco for now    - F/u pre dialysis vancomycin level, give vancomycin 1gm intra dialysis if vanc level <20   - Echo G1DD, EF 70%   - Second set of blood cultures 7/31/20 shows GPC in clusters   - F/u repeat blood culture sent on 8/2/20  - ID Dr. Boston     Vascular   # AVG pseudoaneurysm   - s/p AVG removal and ligation 7/30/20  - monitor for sings of bleeding   - Blood clots are noticed from surgical site ( surgery informed )  - cbc q 8 hrs   - Check pulses q 2hrs   - F/u vascular surgeon Dr. Manzanares     Heme onco  # Anemia of chronic disease ,ESRD  - Hb today 9.1 (8/3/20) s/p 1PRBC transfusion   - epogen during dialysis   - Monitor cbc     Endo  # Hx of DM on hss at home   - Target CBG >140 and < 180  - A1C 5.4  - FS achs    Prophylaxis   - Holding chemical DVT ppx for now given concern of bleeding, on SCD prophylaxis *   - GI : Protonix

## 2020-08-03 NOTE — PROGRESS NOTE ADULT - SUBJECTIVE AND OBJECTIVE BOX
Interval Events:    remains off pressor  on zosyn  poc glucose without hypoglycemia  not requiring sliding scale    Allergies    sulfADIAZINE (Angioedema)    Intolerances      Endocrine/Metabolic Medications:  glucagon  Injectable 1 milliGRAM(s) IntraMuscular once PRN  insulin lispro (HumaLOG) corrective regimen sliding scale   SubCutaneous Before meals and at bedtime      Vital Signs Last 24 Hrs  T(C): 36.9 (03 Aug 2020 11:00), Max: 36.9 (03 Aug 2020 11:00)  T(F): 98.4 (03 Aug 2020 11:00), Max: 98.4 (03 Aug 2020 11:00)  HR: 116 (03 Aug 2020 11:00) (63 - 116)  BP: 107/62 (03 Aug 2020 11:00) (107/62 - 175/98)  BP(mean): 74 (03 Aug 2020 11:00) (74 - 116)  RR: 16 (03 Aug 2020 11:00) (8 - 21)  SpO2: 98% (03 Aug 2020 11:00) (95% - 100%)      PHYSICAL EXAM  Constitutional:    NC/AT:    HEENT:    Neck:  No JVD  Respiratory:  reduced breath sounds b/l bases    Cardiovascular:  RR   Gastrointestinal: Soft     Extremities: without cyanosis      Neurological:  non focal    LABS                        9.1    12.22 )-----------( 303      ( 03 Aug 2020 06:37 )             26.9                               133    |  96     |  12                  Calcium: 8.5   / iCa: x      (08-03 @ 06:37)    ----------------------------<  84        Magnesium: 1.8                              3.5     |  27     |  3.65             Phosphorous: 2.3      TPro  5.3    /  Alb  1.5    /  TBili  0.8    /  DBili  x      /  AST  18     /  ALT  12     /  AlkPhos  211    03 Aug 2020 06:37    CAPILLARY BLOOD GLUCOSE      POCT Blood Glucose.: 100 mg/dL (03 Aug 2020 11:22)  POCT Blood Glucose.: 89 mg/dL (03 Aug 2020 05:53)  POCT Blood Glucose.: 136 mg/dL (02 Aug 2020 21:05)  POCT Blood Glucose.: 82 mg/dL (02 Aug 2020 16:00)        Assesment/plan          68yo female with multiple comorbidities including h/o HTN, DM type 2, ESRD on HD admitted with AV fistula swelling and pain    Endocrinology consulted for glycemic management    DM type 2  complicated by ESRD on HD, sepsis- bacteremia, requiring pressor current admit  NH regimen:  humalog sliding scale    recommendations:  fasting , prandial controlled  serum glucose tightly controlled    - stop low dose sliding scale  - reassess based on requirements  - goal inpatient glucose range: 140-180mg/dl    sepsis  bacteremia  on vancomycin  s/p AV repair/ligation  ID on board  on zosyn  required pressor    ESRD on HD  cautious insulin dosing  may need modified sliding scale    Discussed with primary team  Please call - Endocrine- Dr Mónica Cm as needed    Time spent evaluating patient including coordination of care 35mins.  Time spent evaluating patient including coordination of care- 35mins.

## 2020-08-03 NOTE — SWALLOW BEDSIDE ASSESSMENT ADULT - PHARYNGEAL PHASE
Throat clear post oral intake/Delayed pharyngeal swallow Multiple swallows/Delayed pharyngeal swallow Delayed pharyngeal swallow/Cough post oral intake/Throat clear post oral intake/Multiple swallows Delayed pharyngeal swallow/Multiple swallows

## 2020-08-03 NOTE — SWALLOW BEDSIDE ASSESSMENT ADULT - SWALLOW EVAL: RECOMMENDED FEEDING/EATING TECHNIQUES
maintain upright posture during/after eating for 30 mins/crush medication (when feasible)/small sips/bites/oral hygiene/allow for swallow between intakes/alternate food with liquid/position upright (90 degrees)

## 2020-08-03 NOTE — SWALLOW BEDSIDE ASSESSMENT ADULT - SLP PERTINENT HISTORY OF CURRENT PROBLEM
68 yo F with PMHx of ESRD on HD, HTN, DM presented to the ED with Left Arm pain post dialysis, US doppler showed left upper extremity AVG (likely brachial artery-graft-cephalic vein) patent. Patient was admitted to ICU for septic shock 2/2 AVG infection coag neg staph. Patient had bleeding from pseudoaneurysm, was taken emergently to OR on 7/30/20 for AVG excision and ligation by vascular surgery. Pt was brought back to ICU for post op monitoring and septic shock.

## 2020-08-03 NOTE — PROGRESS NOTE ADULT - ASSESSMENT
Patient is a 69y old  Female from [facility], with PMH of HTN, DM, Hyperphosphatemia,  Oral cancer(s/p excision), ESRD on HD via AVF  which created 3-4 years ago s/p exploration of AVF, interposition graft 6/2017, send in  to the ER for evaluation of  left arm AV fistula pain and swelling x1 week. Last week when she went for her HD session and developed gradual onset of swelling and pain at the AVF site on L arm. As per the patient the HD nurse had inserted the needle incorrectly that caused the swelling and severe pain. The AVF was not occluded and she had her HD sessions as per the schedule Tu/Th/Sat with her last HD session being yesterday.  On admission, she has no fever or Leukocytosis, but admission blood cultures growing GPC in clusters. She has started on Zosyn and Vancomycin and the ID consult requested to assist with further evaluation and antibiotic management.      # Bacteremia- 7/29/20- Coagulase Negative Staph. - ? source AVG - Repeat Blood Cx from 7/31 still positive- TTE as of 7/31 is negative for vegetation - Repeat BCx as of 8/2  NGTD  # Infected AVG with surrounding cellulitis - s/p ligation of Left  AVG pseudoaneurysm 7/30  # Septic shock- Post-op 7/30- transferred to ICU since requiring pressor    would recommend:    1. Vancomycin level in AM and re-dose if less than 30  2. Dose Vancomycin based on level   3. Wound care as per Vascular team  4. ELIZABETH since TTE is negative and AVG Cx is also negative    d/w ICU team    Attending Attestation:    Spent more than 45 minutes on total encounter, more than 50 % of the visit was spent counseling and/or coordinating care by the Attending physician. Patient is a 69y old  Female from [facility], with PMH of HTN, DM, Hyperphosphatemia,  Oral cancer(s/p excision), ESRD on HD via AVF  which created 3-4 years ago s/p exploration of AVF, interposition graft 6/2017, send in  to the ER for evaluation of  left arm AV fistula pain and swelling x1 week. Last week when she went for her HD session and developed gradual onset of swelling and pain at the AVF site on L arm. As per the patient the HD nurse had inserted the needle incorrectly that caused the swelling and severe pain. The AVF was not occluded and she had her HD sessions as per the schedule Tu/Th/Sat with her last HD session being yesterday.  On admission, she has no fever or Leukocytosis, but admission blood cultures growing GPC in clusters. She has started on Zosyn and Vancomycin and the ID consult requested to assist with further evaluation and antibiotic management.      # Bacteremia- 7/29/20- Coagulase Negative Staph. - ? source AVG - Repeat Blood Cx from 7/31 still positive- TTE as of 7/31 is negative for vegetation - Repeat BCx as of 8/2  NGTD  # Infected AVG with surrounding cellulitis - s/p ligation of Left  AVG pseudoaneurysm 7/30  # Septic shock- Post-op 7/30- transferred to ICU since requiring pressor    would recommend:    1. Vancomycin level in AM and re-dose if less than 30  2. Dose Vancomycin based on daily level   3. Wound care as per Vascular team  4. ELIZABETH since TTE is negative and AVG Cx is also negative    d/w ICU team    Attending Attestation:    Spent more than 45 minutes on total encounter, more than 50 % of the visit was spent counseling and/or coordinating care by the Attending physician.

## 2020-08-03 NOTE — PROGRESS NOTE ADULT - SUBJECTIVE AND OBJECTIVE BOX
INTERVAL HPI/OVERNIGHT EVENTS:    Pt seen and examined at bedside. Offers no acute complaints at this time;    Vital Signs Last 24 Hrs  T(C): 36.3 (03 Aug 2020 10:45), Max: 36.4 (03 Aug 2020 04:00)  T(F): 97.3 (03 Aug 2020 10:45), Max: 97.6 (03 Aug 2020 04:00)  HR: 91 (03 Aug 2020 10:45) (63 - 99)  BP: 122/89 (03 Aug 2020 10:45) (120/85 - 175/98)  BP(mean): 107 (03 Aug 2020 08:30) (84 - 116)  RR: 18 (03 Aug 2020 10:45) (8 - 21)  SpO2: 100% (03 Aug 2020 10:45) (95% - 100%)  I&O's Detail    02 Aug 2020 07:01  -  03 Aug 2020 07:00  --------------------------------------------------------  IN:    Oral Fluid: 280 mL    Packed Red Blood Cells: 350 mL    Solution: 200 mL  Total IN: 830 mL    OUT:  Total OUT: 0 mL    Total NET: 830 mL      03 Aug 2020 07:01  -  03 Aug 2020 11:30  --------------------------------------------------------  IN:    Other: 400 mL  Total IN: 400 mL    OUT:    Other: 499 mL  Total OUT: 499 mL    Total NET: -99 mL        pantoprazole    Tablet 40 milliGRAM(s) Oral before breakfast  polyethylene glycol 3350 17 Gram(s) Oral daily  senna 2 Tablet(s) Oral at bedtime      Physical Exam  General: AAOx3, No acute distress  Extremities: anteromedial left upper extremity wound with no active bleeding or drainage; dressing with serosanguinous drainage removed and changed; wound redressed with xeroform, dry gauze and kerlex and ace wrap. hand edema, 2+ radial and ulnar pulses palpable        Labs:                        9.1    12.22 )-----------( 303      ( 03 Aug 2020 06:37 )             26.9     08-03    133<L>  |  96  |  12  ----------------------------<  84  3.5   |  27  |  3.65<H>    Ca    8.5      03 Aug 2020 06:37  Phos  2.3     08-03  Mg     1.8     08-03    TPro  5.3<L>  /  Alb  1.5<L>  /  TBili  0.8  /  DBili  x   /  AST  18  /  ALT  12  /  AlkPhos  211<H>  08-03

## 2020-08-03 NOTE — CHART NOTE - NSCHARTNOTEFT_GEN_A_CORE
Assessment:   Patient is a 69y old  Female who presents with a chief complaint of painful swelling at AV fistula. (03 Aug 2020 15:00). RN requested pt be seen for flavor preference Nepro. Pt visited at lunch, not taking from tray. Reports she does not want food. reports she can take 3 Nepro/ day (Vanilla only). PGY1 has increased Nepro to TID,. Kitchen informed of flavor preference. Then SLP done. Reports Puree/ NTL (ordered).      Factors impacting intake: [ ] none [ ] nausea  [ ] vomiting [ ] diarrhea [ ] constipation  [ ]chewing problems [ ] swallowing issues  [x ] other: chronic illness    Diet Prescription: Diet, Dysphagia 1 Pureed-Nectar Consistency Fluid:   Supplement Feeding Modality:  Oral  Nepro Cans or Servings Per Day:  1       Frequency:  Three Times a day (20 @ 14:50)        Daily     Daily Weight in k.4 (03 Aug 2020 10:45)  Weight in k.6 (03 Aug 2020 08:50)  Weight in k (03 Aug 2020 08:00)  Weight in k (02 Aug 2020 07:15)  Weight in k.3 (01 Aug 2020 18:32)  Weight in k.3 (01 Aug 2020 15:32)  Weight in k.4 (01 Aug 2020 08:00)  Weight in k.3 (2020 16:33)  Weight in k.5 (2020 08:00)    % Weight Change: I>O, HD pt    Pertinent Medications: MEDICATIONS  (STANDING):  chlorhexidine 2% Cloths 1 Application(s) Topical <User Schedule>  epoetin joey-epbx (RETACRIT) Injectable 4000 Unit(s) IV Push <User Schedule>  pantoprazole    Tablet 40 milliGRAM(s) Oral before breakfast  polyethylene glycol 3350 17 Gram(s) Oral daily  senna 2 Tablet(s) Oral at bedtime    MEDICATIONS  (PRN):  glucagon  Injectable 1 milliGRAM(s) IntraMuscular once PRN Glucose <70 milliGRAM(s)/deciLiter  HYDROmorphone  Injectable 0.5 milliGRAM(s) IV Push every 6 hours PRN Moderate Pain (4 - 6)    Pertinent Labs:  Na133 mmol/L<L> Glu 84 mg/dL K+ 3.5 mmol/L Cr  3.65 mg/dL<H> BUN 12 mg/dL  Phos 2.3 mg/dL<L>  Alb 1.5 g/dL<L>  Chol <50 mg/dL LDL <24 mg/dL HDL 11 mg/dL<L> Trig 77 mg/dL     CAPILLARY BLOOD GLUCOSE      POCT Blood Glucose.: 100 mg/dL (03 Aug 2020 11:22)  POCT Blood Glucose.: 89 mg/dL (03 Aug 2020 05:53)  POCT Blood Glucose.: 136 mg/dL (02 Aug 2020 21:05)  POCT Blood Glucose.: 82 mg/dL (02 Aug 2020 16:00)        Estimated Needs:   [x ] no change since previous assessment  [ ] recalculated:       Previous Nutrition Diagnosis:   [ ] Altered GI function  [ ]Inadequate Oral Intake [ ] Swallowing Difficulty   [ ] Altered nutrition related labs [ ] Increased Nutrient Needs [ ] Overweight/Obesity   [ ] Unintended Weight Loss [ ] Food & Nutrition Related Knowledge Deficit [x] Malnutrition (severe PCM)  [ ] Other:     Nutrition Diagnosis is [x ] ongoing  [ ] resolved [ ] not applicable         Interventions:   Recommend  [ ] Change Diet To:  [x ] Nutrition Supplement: Nepro TID  [ ] Nutrition Support  [x ] Other: MD to monitor. RD available.     Monitoring and Evaluation:   [x ] PO intake [ x ] Tolerance to diet prescription [ x ] weights [ x ] labs[ x ] follow up per protocol  [ ] other:

## 2020-08-03 NOTE — PHYSICAL THERAPY INITIAL EVALUATION ADULT - DISCHARGE DISPOSITION, PT EVAL
Pt. presents with impairments in balance, strength, and endurance, and is an appropriate candidate for Sub- Acute Rehab/rehabilitation facility

## 2020-08-03 NOTE — PROGRESS NOTE ADULT - SUBJECTIVE AND OBJECTIVE BOX
Patient is seen and examined at the bed side, is afebrile.  She is doing better, off pressor.  The WBC count mildly elevated. The repeat Blood culture from  is negative to date.        REVIEW OF SYSTEMS: All other review systems are negative        ALLERGIES: sulfADIAZINE (Angioedema)        ICU Vital Signs Last 24 Hrs  T(C): 36.8 (03 Aug 2020 12:00), Max: 36.9 (03 Aug 2020 11:00)  T(F): 98.2 (03 Aug 2020 12:00), Max: 98.4 (03 Aug 2020 11:00)  HR: 96 (03 Aug 2020 13:00) (70 - 128)  BP: 120/80 (03 Aug 2020 13:) (100/76 - 175/98)  BP(mean): 90 (03 Aug 2020 13:) (74 - 116)  ABP: --  ABP(mean): --  RR: 18 (03 Aug 2020 13:00) (8 - 22)  SpO2: 100% (03 Aug 2020 13:00) (95% - 100%)          PHYSICAL EXAM:  GENERAL: Not in distress   CHEST/LUNG:  Not using accessory muscles  HEART: s1 and s2 present  ABDOMEN:  Nontender and  Nondistended  EXTREMITIES: Left UE ACE bandage in placed, the swelling and tenderness is improving   CNS: Awake and Alert          LABS:                        9.1    12.22 )-----------( 303      ( 03 Aug 2020 06:37 )             26.9                           9.7    10.69 )-----------( 281      ( 02 Aug 2020 16:42 )             28.5                           8.5    14.60 )-----------( 291      ( 01 Aug 2020 06:04 )             25.8       08-03    133<L>  |  96  |  12  ----------------------------<  84  3.5   |  27  |  3.65<H>    Ca    8.5      03 Aug 2020 06:37  Phos  2.3     08-03  Mg     1.8     08-03    TPro  5.3<L>  /  Alb  1.5<L>  /  TBili  0.8  /  DBili  x   /  AST  18  /  ALT  12  /  AlkPhos  211<H>  08-03      08-02    132<L>  |  97  |  11  ----------------------------<  103<H>  3.6   |  28  |  3.13<H>    Ca    8.4      02 Aug 2020 16:53  Phos  2.1       Mg     1.6         TPro  4.8<L>  /  Alb  1.5<L>  /  TBili  0.5  /  DBili  x   /  AST  15  /  ALT  13  /  AlkPhos  173<H>      PT/INR - ( 2020 16:23 )   PT: 15.1 sec;   INR: 1.31 ratio      PTT - ( 2020 16:23 )  PTT:35.4 sec        CAPILLARY BLOOD GLUCOSE  POCT Blood Glucose.: 114 mg/dL (2020 18:11)  POCT Blood Glucose.: 111 mg/dL (2020 12:15)  POCT Blood Glucose.: 102 mg/dL (2020 10:21)  POCT Blood Glucose.: 77 mg/dL (2020 09:09)  POCT Blood Glucose.: 56 mg/dL (2020 09:07)        Urinalysis Basic - ( 2020 17:02 )  Color: Red / Appearance: bloody / S.015 / pH: x  Gluc: x / Ketone: Negative  / Bili: Small / Urobili: Negative   Blood: x / Protein: 100 / Nitrite: Negative   Leuk Esterase: Moderate / RBC: >50 /HPF / WBC 11-25 /HPF   Sq Epi: x / Non Sq Epi: Few /HPF / Bacteria: Moderate /HPF      Vancomycin Level, Trough: 36.3: Vancomycin trough levels should be rapidly reached and maintained at  15-20 ug/ml for life threatening MRSA  infections such as sepsis, endocarditis, osteomyelitis and pneumonia.       Vancomycin Level, Trough (20 @ 15:53)    Vancomycin Level, Trough: 18.2: Vancomycin trough levels should be rapidly reached and maintained at  15-20 ug/ml for life threatening MRSA  infections such as sepsis, endocarditis, osteomyelitis and pneumonia.     Vancomycin Level, Trough (20 @ 08:16)    Vancomycin Level, Trough: 29.2: Vancomycin trough levels should be rapidly reached and maintained at  15-20 ug/ml for life threatening MRSA  infections such as sepsis, endocarditis, osteomyelitis and pneumonia.         MEDICATIONS  (STANDING):    chlorhexidine 2% Cloths 1 Application(s) Topical <User Schedule>  epoetin joey-epbx (RETACRIT) Injectable 4000 Unit(s) IV Push <User Schedule>  pantoprazole    Tablet 40 milliGRAM(s) Oral before breakfast  polyethylene glycol 3350 17 Gram(s) Oral daily  senna 2 Tablet(s) Oral at bedtime        RADIOLOGY & ADDITIONAL TESTS:      < from: Transthoracic Echocardiogram (20 @ 08:49) >  1. Normal mitral valve.  2. Normal trileaflet aortic valve. Mild aortic  regurgitation.  3. Aortic Root: 2.7 cm.  4. Normal left atrium.  LA volume index = 26 cc/m2.  5. Mild concentric left ventricular hypertrophy.  6. Hyperdynamic left ventricular systolic function (EF  >70%).  7. Grade I diastolic dysfunction (Impaired relaxation).  8. Normal right atrium.  9. Normal right ventricular size and systolic function  (TAPSE  2.1cm).  10. Unable to estimate RVSP.  11. Normal tricuspid valve.  12. Normal pulmonic valve.  13. Normal pericardium with no pericardial effusion.    < end of copied text >    20: US Duplex Hemodialysis Access (20 @ 20:44) >  impression: The left upper extremity AVG (likely brachial artery-graft-cephalic vein) is patent. There is narrowing of the graft lumen due to the presence of thrombus, about 40% narrowing. 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      20: Xray Chest 1 View-PORTABLE IMMEDIATE (20 @ 17:54) The cardiac silhouette is within normal limits. The lungs are clear. No pleural abnormality.          MICROBIOLOGY DATA:    Culture - Blood (20 @ 11:14)    Specimen Source: .Blood Blood-Peripheral    Culture Results:   No growth to date.        Culture - Blood (20 @ 08:06)    Gram Stain:   Growth in aerobic bottle: Gram Positive Cocci in Clusters  Growth in anaerobic bottle: Gram Positive Cocci in Clusters    Specimen Source: .Blood Blood-Peripheral    Culture Results:   Growth in aerobic bottle: Staphylococcus epidermidis "Susceptibilities  not performed"  Growth in anaerobic bottle: Gram Positive Cocci in Clusters        Culture - Blood (20 @ 08:06)    Gram Stain:   Growth in aerobic bottle: Gram Positive Cocci in Clusters    Specimen Source: .Blood Blood-Peripheral    Culture Results:   Growth in aerobic bottle: Gram Positive Cocci in Clusters        Culture - Blood (20 @ 08:06)    Specimen Source: .Blood Blood-Peripheral    Culture Results:   No growth to date.      Culture - Surgical Swab (20 @ 06:41)    Specimen Source: .Surgical Swab left arm av graft C&S    Culture Results:   No growth      COVID-19  Antibody - for prior infection screening (20 @ 10:08)    COVID-19 IgG Antibody Index: 7.70: [employer] CMIA  Negative Result    <= 1.39 Index  Positive Result      >= 1.40 Index Index    COVID-19 IgG Antibody Interpretation: Positive: This test has not been reviewed by the FDA by the standard review  process. It has been authorized for emergency use by the FDA. [employer] has validated this test to be accurate.  Negative results do not rule out SARS-CoV-2 infection, particularly in  those who have been in recent contact with the virus. Follow-up testing  with a molecular diagnostic test should be considered to rule out  infection in these individuals.  Results from antibody testing should not be used as thesole basis to  diagnose or exclude SARS-CoV-2 infection, or to inform infection status.  Positive results may rarely be due to past or present infection with  non-SARS-CoV-2 coronavirus strains, such as coronavirus HKU1, NL63, OC43,  or 229E. [employer], through extensive validation  testing, has confirmed that this risk is minimal with this test.      Culture - Urine (20 @ 01:34)    Specimen Source: .Urine Clean Catch (Midstream)    Culture Results:   >=3 organisms. Probable collection contamination.      Culture - Blood (20 @ 23:02)    Gram Stain:   Growth in anaerobic bottle: Gram Positive Cocci in Clusters  Growth in aerobic bottle: Gram Positive Cocci in Clusters    -  Ampicillin/Sulbactam: R <=8/4    -  Cefazolin: R <=4    -  Clindamycin: R >4    -  Erythromycin: R >4    -  Gentamicin: R >8 Should not be used as monotherapy    -  Oxacillin: R >2    -  Penicillin: R 8    -  RIF- Rifampin: S <=1 Should not be used as monotherapy    -  Tetra/Doxy: S <=1    -  Trimethoprim/Sulfamethoxazole: R >2/38    -  Vancomycin: S 2    -  Coagulase negative Staphylococcus: Detec    Specimen Source: .Blood Blood-Peripheral    Organism: Blood Culture PCR    Organism: Staphylococcus epidermidis    Culture Results:   Growth in aerobic and anaerobic bottles: Staphylococcus epidermidis  "Due to technical problems, Proteus sp. will Not be reported as part of  the BCID panel until further notice"  ***Blood Panel PCR results on this specimen are available  approximately 3 hours after the Gram stain result.***  Gram stain, PCR, and/or culture results may not always  correspond due to difference in methodologies.  ************************************************************  This PCR assay was performed using [employer].  The following targets are tested for: Enterococcus,  vancomycin resistant enterococci, Listeria monocytogenes,  coagulase negative staphylococci, S. aureus,  methicillin resistant S. aureus, Streptococcus agalactiae  (Group B), S. pneumoniae, S. pyogenes (Group A),  Acinetobacter baumannii, Enterobacter cloacae, E. coli,  Klebsiella oxytoca, K. pneumoniae, Proteus sp.,  Serratia marcescens, Haemophilus influenzae,  Neisseria meningitidis, Pseudomonas aeruginosa, Candida  albicans, C. glabrata, C krusei, C parapsilosis,  C. tropicalis and the KPC resistance gene.    Organism Identification: Blood Culture PCR  Staphylococcus epidermidis    Method Type: PCR    Method Type: JOSÉ MIGUEL      Culture - Blood (20 @ 23:02)    Gram Stain:   Growth in anaerobic bottle: Gram Positive Cocci in Clusters  Growth in aerobic bottle: Gram Positive Cocci in Clusters    -  Coagulase negative Staphylococcus: Detec    Specimen Source: .Blood Blood-Peripheral    Organism: Blood Culture PCR    Culture Results:   Growth in aerobic and anaerobic bottles: Staphylococcus epidermidis  "Due to technical problems, Proteus sp. will Not be reported as part of  the BCID panel until further notice"  ***Blood Panel PCR results on this specimen are available  approximately 3 hours after the Gram stain result.***  Gram stain, PCR, and/or culture results may not always  correspond due to difference in methodologies.  ************************************************************  This PCR assay was performed using [employer].  The following targets are tested for: Enterococcus,  vancomycin resistant enterococci, Listeria monocytogenes,  coagulase negative staphylococci, S. aureus,  methicillin resistant S. aureus, Streptococcus agalactiae  (Group B), S. pneumoniae, S. pyogenes (Group A),  Acinetobacter baumannii, Enterobacter cloacae, E. coli,  Klebsiella oxytoca, K. pneumoniae, Proteus sp.,  Serratia marcescens, Haemophilus influenzae,  Neisseria meningitidis, Pseudomonas aeruginosa, Candida  albicans, C. glabrata, C krusei, C parapsilosis,  C. tropicalis and the KPC resistance gene.    Organism Identification: Blood Culture PCR    Method Type: PCR

## 2020-08-03 NOTE — PROGRESS NOTE ADULT - SUBJECTIVE AND OBJECTIVE BOX
Patient is a 69y old  Female who presents with a chief complaint of Painful swelling at AV fistula. (02 Aug 2020 20:36)    pt seen in icu [ x ], reg med floor [   ], bed [ x ], chair at bedside [   ], a+o x3 [ x ], lethargic [  ],    nad [ x ]      Allergies    sulfADIAZINE (Angioedema)        Vitals    T(F): 97.6 (08-03-20 @ 04:00), Max: 97.6 (08-03-20 @ 04:00)  HR: 82 (08-03-20 @ 05:00) (60 - 102)  BP: 175/98 (08-03-20 @ 05:00) (100/69 - 175/98)  RR: 15 (08-03-20 @ 05:00) (8 - 22)  SpO2: 95% (08-03-20 @ 05:00) (95% - 100%)  Wt(kg): --  CAPILLARY BLOOD GLUCOSE      POCT Blood Glucose.: 89 mg/dL (03 Aug 2020 05:53)      Labs                          9.3    11.07 )-----------( 291      ( 02 Aug 2020 23:53 )             27.2       08-02    132<L>  |  97  |  11  ----------------------------<  103<H>  3.6   |  28  |  3.13<H>    Ca    8.4      02 Aug 2020 16:53  Phos  2.1     08-02  Mg     1.6     08-02    TPro  4.8<L>  /  Alb  1.5<L>  /  TBili  0.5  /  DBili  x   /  AST  15  /  ALT  13  /  AlkPhos  173<H>  08-02            .Blood Blood-Peripheral  07-31 @ 08:06   Growth in aerobic and anaerobic bottles: Staphylococcus epidermidis  "Susceptibilities not performed"  --    Growth in aerobic bottle: Gram Positive Cocci in Clusters  Growth in anaerobic bottle: Gram Positive Cocci in Clusters      .Surgical Swab left arm av graft C&S  07-31 @ 06:41   Normal skin albin isolated  --  --      .Urine Clean Catch (Midstream)  07-30 @ 01:34   >=3 organisms. Probable collection contamination.  --  --      .Blood Blood-Peripheral  07-29 @ 23:02   Growth in aerobic and anaerobic bottles: Staphylococcus epidermidis  "Due to technical problems, Proteus sp. will Not be reported as part of  the BCID panel until further notice"  ***Blood Panel PCR results on this specimen are available  approximately 3 hours after the Gram stain result.***  Gram stain, PCR, and/or culture results may not always  correspond due to difference in methodologies.  ************************************************************  This PCR assay was performed using [employer].  The following targets are tested for: Enterococcus,  vancomycin resistant enterococci, Listeria monocytogenes,  coagulase negative staphylococci, S. aureus,  methicillin resistant S. aureus, Streptococcus agalactiae  (Group B), S. pneumoniae, S. pyogenes (Group A),  Acinetobacter baumannii, Enterobacter cloacae, E. coli,  Klebsiella oxytoca, K. pneumoniae, Proteus sp.,  Serratia marcescens, Haemophilus influenzae,  Neisseria meningitidis, Pseudomonas aeruginosa, Candida  albicans, C. glabrata, C krusei, C parapsilosis,  C. tropicalis and the KPC resistance gene.  --  Blood Culture PCR          Radiology Results      Meds    MEDICATIONS  (STANDING):  chlorhexidine 2% Cloths 1 Application(s) Topical <User Schedule>  epoetin joey-epbx (RETACRIT) Injectable 4000 Unit(s) IV Push <User Schedule>  insulin lispro (HumaLOG) corrective regimen sliding scale   SubCutaneous Before meals and at bedtime  pantoprazole    Tablet 40 milliGRAM(s) Oral before breakfast  piperacillin/tazobactam IVPB.. 3.375 Gram(s) IV Intermittent every 12 hours      MEDICATIONS  (PRN):  glucagon  Injectable 1 milliGRAM(s) IntraMuscular once PRN Glucose <70 milliGRAM(s)/deciLiter  HYDROmorphone  Injectable 1 milliGRAM(s) IV Push every 6 hours PRN Severe Pain (7 - 10)      Physical Exam    Neuro :  no focal deficits  Respiratory: CTA B/L  CV: RRR, S1S2, no murmurs,   Abdominal: Soft, NT, ND +BS,  Extremities: No edema, + peripheral pulses, left ue dressing cdi, distal forearm and hand edema,      ASSESSMENT      Infected prosthetic vascular graft LUE  Pseudoaneurysm of LUE arteriovenous graft  Cellulitis of left upper extremity    uti  h/o ESRD on hd,   DM (diabetes mellitus)  Vitamin D deficiency  Hyperphosphatemia  Hypertension  S/P bunionectomy  hemorrhoidectomy  oral cancer  left AVF (arteriovenous fistula)      PLAN      s/p Excision, infected graft, extremity and Ligation of arteriovenous fistula or access graft of upper extremity 7/30/20  vasc surg f/u    cont pain control  cont wound care  gi/dvt prophylaxis  cont zosyn,   cont  vanco with hd  id f/u   blood cx with  Staphylococcus epidermidis noted above  f/u rept blood cx   echo with Normal mitral valve. Normal trileaflet aortic valve. Mild aortic regurgitation.  Hyperdynamic left ventricular systolic function (EF >70%). Grade I diastolic dysfunction noted above  s/p right femoral shiley placement  renal f/u   cont hd as per renal   pt off pressors  hgba1c 5.4   endo f/u   hss  cont current meds   mgmt as per icu Patient is a 69y old  Female who presents with a chief complaint of Painful swelling at AV fistula. (02 Aug 2020 20:36)    pt seen in icu [ x ], reg med floor [   ], bed [ x ], chair at bedside [   ], a+o x3 [ x ], lethargic [  ],    nad [ x ]      pt s/p acute blood loss yesterday and transfused 1 unit prbc      Allergies    sulfADIAZINE (Angioedema)        Vitals    T(F): 97.6 (08-03-20 @ 04:00), Max: 97.6 (08-03-20 @ 04:00)  HR: 82 (08-03-20 @ 05:00) (60 - 102)  BP: 175/98 (08-03-20 @ 05:00) (100/69 - 175/98)  RR: 15 (08-03-20 @ 05:00) (8 - 22)  SpO2: 95% (08-03-20 @ 05:00) (95% - 100%)  Wt(kg): --  CAPILLARY BLOOD GLUCOSE      POCT Blood Glucose.: 89 mg/dL (03 Aug 2020 05:53)      Labs                          9.3    11.07 )-----------( 291      ( 02 Aug 2020 23:53 )             27.2       08-02    132<L>  |  97  |  11  ----------------------------<  103<H>  3.6   |  28  |  3.13<H>    Ca    8.4      02 Aug 2020 16:53  Phos  2.1     08-02  Mg     1.6     08-02    TPro  4.8<L>  /  Alb  1.5<L>  /  TBili  0.5  /  DBili  x   /  AST  15  /  ALT  13  /  AlkPhos  173<H>  08-02            .Blood Blood-Peripheral  07-31 @ 08:06   Growth in aerobic and anaerobic bottles: Staphylococcus epidermidis  "Susceptibilities not performed"  --    Growth in aerobic bottle: Gram Positive Cocci in Clusters  Growth in anaerobic bottle: Gram Positive Cocci in Clusters      .Surgical Swab left arm av graft C&S  07-31 @ 06:41   Normal skin albin isolated  --  --      .Urine Clean Catch (Midstream)  07-30 @ 01:34   >=3 organisms. Probable collection contamination.  --  --      .Blood Blood-Peripheral  07-29 @ 23:02   Growth in aerobic and anaerobic bottles: Staphylococcus epidermidis  "Due to technical problems, Proteus sp. will Not be reported as part of  the BCID panel until further notice"  ***Blood Panel PCR results on this specimen are available  approximately 3 hours after the Gram stain result.***  Gram stain, PCR, and/or culture results may not always  correspond due to difference in methodologies.  ************************************************************  This PCR assay was performed using [employer].  The following targets are tested for: Enterococcus,  vancomycin resistant enterococci, Listeria monocytogenes,  coagulase negative staphylococci, S. aureus,  methicillin resistant S. aureus, Streptococcus agalactiae  (Group B), S. pneumoniae, S. pyogenes (Group A),  Acinetobacter baumannii, Enterobacter cloacae, E. coli,  Klebsiella oxytoca, K. pneumoniae, Proteus sp.,  Serratia marcescens, Haemophilus influenzae,  Neisseria meningitidis, Pseudomonas aeruginosa, Candida  albicans, C. glabrata, C krusei, C parapsilosis,  C. tropicalis and the KPC resistance gene.  --  Blood Culture PCR          Radiology Results      < from: CT Abdomen and Pelvis No Cont (08.02.20 @ 14:11) >  FINDINGS:  LOWER CHEST: Small bilateral pleural effusions. Partial compressive atelectasis of both lower lobes.    LIVER: Hyperdense appearance of the liver, possibly amiodarone ingestion or hemachromatosis.  BILE DUCTS: Normal caliber.  GALLBLADDER: Calcification along the posterior aspect of the gallbladder.  SPLEEN: Within normal limits.  PANCREAS: Diffuse coarsecalcifications, possibly sequela of prior pancreatitis.  ADRENALS: Within normal limits.  KIDNEYS/URETERS: No hydronephrosis. Punctate nonobstructing right lower pole calculus. Subcentimeter hypodense left lower pole renal lesion, too small to characterize.    BLADDER: Within normal limits.  REPRODUCTIVE ORGANS: Calcified uterine leiomyomas.    BOWEL: Moderate-sized hiatal hernia. No bowel obstruction. Large amount of stool within the rectum.  PERITONEUM: Small volume abdominopelvic ascites.  VESSELS: Atherosclerotic changes. Right femoral approach central venous catheter with tip terminating in the right common iliac vein.  RETROPERITONEUM/LYMPH NODES: No lymphadenopathy.  ABDOMINAL WALL: Anasarca.  BONES: Degenerative changes.    IMPRESSION:  No retroperitoneal hematoma.    Large amount of stool within the rectum.      < end of copied text >      Meds    MEDICATIONS  (STANDING):  chlorhexidine 2% Cloths 1 Application(s) Topical <User Schedule>  epoetin joey-epbx (RETACRIT) Injectable 4000 Unit(s) IV Push <User Schedule>  insulin lispro (HumaLOG) corrective regimen sliding scale   SubCutaneous Before meals and at bedtime  pantoprazole    Tablet 40 milliGRAM(s) Oral before breakfast  piperacillin/tazobactam IVPB.. 3.375 Gram(s) IV Intermittent every 12 hours      MEDICATIONS  (PRN):  glucagon  Injectable 1 milliGRAM(s) IntraMuscular once PRN Glucose <70 milliGRAM(s)/deciLiter  HYDROmorphone  Injectable 1 milliGRAM(s) IV Push every 6 hours PRN Severe Pain (7 - 10)      Physical Exam    Neuro :  no focal deficits  Respiratory: CTA B/L  CV: RRR, S1S2, no murmurs,   Abdominal: Soft, NT, ND +BS,  Extremities: No edema, + peripheral pulses, left ue dressing cdi, distal forearm and hand edema,      ASSESSMENT      Infected prosthetic vascular graft LUE  Pseudoaneurysm of LUE arteriovenous graft  Cellulitis of left upper extremity    uti   a/p acute blood loss anemia  h/o ESRD on hd,   DM (diabetes mellitus)  Vitamin D deficiency  Hyperphosphatemia  Hypertension  S/P bunionectomy  hemorrhoidectomy  oral cancer  left AVF (arteriovenous fistula)      PLAN      s/p Excision, infected graft, extremity and Ligation of arteriovenous fistula or access graft of upper extremity 7/30/20  vasc surg f/u    cont pain control  cont wound care  gi/dvt prophylaxis  cont zosyn,   cont  vanco with hd  id f/u   blood cx with  Staphylococcus epidermidis noted above  f/u rept blood cx    Surgical Swab left arm av graft C&S with Normal skin albin isolated noted above   echo with Normal mitral valve. Normal trileaflet aortic valve. Mild aortic regurgitation.  Hyperdynamic left ventricular systolic function (EF >70%). Grade I diastolic dysfunction noted above  s/p right femoral shiley placement   ct abd pelv with No retroperitoneal hematoma. Large amount of stool within the rectum.  add senna 2 tabs qhs  add miralax daily  renal f/u   cont hd as per renal   pt off pressors  hgba1c 5.4   endo f/u   hss  cont current meds   mgmt as per icu

## 2020-08-03 NOTE — PROGRESS NOTE ADULT - SUBJECTIVE AND OBJECTIVE BOX
CHIEF COMPLAINT:Patient is a 69y old  Female who presents with a chief complaint of Painful swelling at AV fistula.Pt feeling better today.    	  REVIEW OF SYSTEMS:  CONSTITUTIONAL: No fever, weight loss, or fatigue  EYES: No eye pain, visual disturbances, or discharge  ENT:  No difficulty hearing, tinnitus, vertigo; No sinus or throat pain  NECK: No pain or stiffness  RESPIRATORY: No cough, wheezing, chills or hemoptysis; No Shortness of Breath  CARDIOVASCULAR: No chest pain, palpitations, passing out, dizziness, or leg swelling  GASTROINTESTINAL: No abdominal or epigastric pain. No nausea, vomiting, or hematemesis; No diarrhea or constipation. No melena or hematochezia.  GENITOURINARY: No dysuria, frequency, hematuria, or incontinence  NEUROLOGICAL: No headaches, memory loss, loss of strength, numbness, or tremors  SKIN: No itching, burning, rashes, or lesions   LYMPH Nodes: No enlarged glands  ENDOCRINE: No heat or cold intolerance; No hair loss  MUSCULOSKELETAL: No joint pain or swelling; No muscle, back, or extremity pain  PSYCHIATRIC: No depression, anxiety, mood swings, or difficulty sleeping  HEME/LYMPH: No easy bruising, or bleeding gums  ALLERGY AND IMMUNOLOGIC: No hives or eczema	        PHYSICAL EXAM:  T(C): 36.1 (08-03-20 @ 08:00), Max: 36.4 (08-03-20 @ 04:00)  HR: 97 (08-03-20 @ 08:30) (60 - 99)  BP: 130/99 (08-03-20 @ 08:30) (120/85 - 175/98)  RR: 10 (08-03-20 @ 08:30) (8 - 21)  SpO2: 100% (08-03-20 @ 08:30) (95% - 100%)  Wt(kg): --  I&O's Summary    02 Aug 2020 07:01  -  03 Aug 2020 07:00  --------------------------------------------------------  IN: 830 mL / OUT: 0 mL / NET: 830 mL    03 Aug 2020 07:01  -  03 Aug 2020 10:03  --------------------------------------------------------  IN: 0 mL / OUT: 0 mL / NET: 0 mL        Appearance: Normal	  HEENT:   Normal oral mucosa, PERRL, EOMI	  Lymphatic: No lymphadenopathy  Cardiovascular: Normal S1 S2, No JVD, No murmurs, No edema  Respiratory: Lungs clear to auscultation	  Psychiatry: A & O x 3, Mood & affect appropriate  Gastrointestinal:  Soft, Non-tender, + BS	  Skin: No rashes, No ecchymoses, No cyanosis	  Neurologic: Non-focal  Extremities: Normal range of motion, No clubbing, cyanosis or edema  Vascular: Peripheral pulses palpable 2+ bilaterally    MEDICATIONS  (STANDING):  chlorhexidine 2% Cloths 1 Application(s) Topical <User Schedule>  epoetin joey-epbx (RETACRIT) Injectable 4000 Unit(s) IV Push <User Schedule>  insulin lispro (HumaLOG) corrective regimen sliding scale   SubCutaneous Before meals and at bedtime  pantoprazole    Tablet 40 milliGRAM(s) Oral before breakfast  polyethylene glycol 3350 17 Gram(s) Oral daily  senna 2 Tablet(s) Oral at bedtime      LABS:	 	                     9.1    12.22 )-----------( 303      ( 03 Aug 2020 06:37 )             26.9     08-03    133<L>  |  96  |  12  ----------------------------<  84  3.5   |  27  |  3.65<H>    Ca    8.5      03 Aug 2020 06:37  Phos  2.3     08-03  Mg     1.8     08-03    TPro  5.3<L>  /  Alb  1.5<L>  /  TBili  0.8  /  DBili  x   /  AST  18  /  ALT  12  /  AlkPhos  211<H>  08-03      Lipid Profile: Cholesterol <50  LDL <24  HDL 11  TG 77      TSH: Thyroid Stimulating Hormone, Serum: 3.09 uU/mL (07-30 @ 06:31)      	  EXAM:  CT NECK SOFT TISSUE IC                            PROCEDURE DATE:  08/02/2020          INTERPRETATION:  CLINICAL STATEMENT: Dysphagia    TECHNIQUE: CT of the neck was performed with IV contrast.  Approximately 95 cc of Omnipaque 350 administered.    COMPARISON: None.    FINDINGS:  The airway is patent. Secretions noted in the posterior trachea    No cervical fluid collection.    There is no abnormally enlarged lymph nodes by CT size criteria.    The parotid, left submandibular glands arewithin normal limits. Atrophic right submandibular gland noted    Heterogeneous thyroid gland with small nodules noted.    5 mm nodule superior right lower lobe. 6 mm spiculated nodule left upper lobe. Smaller nodules also noted in the left upper lobe 2 mm nodule right upper lobe    Retention cyst/polyp right maxillary sinus.    Degenerative changes noted.    Trace bilateral pleural effusions    IMPRESSION:  No cervical fluid collection. If malignancy is a strong clinical concern, PET CT exam recommended for further evaluation.    Multiple pulmonary nodules as described above. CT chest recommended              PAULETTE GIFFORD M.D., ATTENDING RADIOLOGIST  This document has been electronically signed. Aug  2 2020  2:25PM            EXAM:  CT ABDOMEN AND PELVIS                            PROCEDURE DATE:  08/02/2020          INTERPRETATION:  CLINICAL INFORMATION: Significant hemoglobin drop. Swelling.    COMPARISON: None.    PROCEDURE:  CT of the Abdomen and Pelvis was performed without intravenous contrast.  Intravenous contrast: None.  Oral contrast: None.  Sagittal and coronal reformats were performed.    FINDINGS:  LOWER CHEST: Small bilateral pleural effusions. Partial compressive atelectasis of both lower lobes.    LIVER: Hyperdense appearance of the liver, possibly amiodarone ingestion or hemachromatosis.  BILE DUCTS: Normal caliber.  GALLBLADDER: Calcification along the posterior aspect of the gallbladder.  SPLEEN: Within normal limits.  PANCREAS: Diffuse coarsecalcifications, possibly sequela of prior pancreatitis.  ADRENALS: Within normal limits.  KIDNEYS/URETERS: No hydronephrosis. Punctate nonobstructing right lower pole calculus. Subcentimeter hypodense left lower pole renal lesion, too small to characterize.    BLADDER: Within normal limits.  REPRODUCTIVE ORGANS: Calcified uterine leiomyomas.    BOWEL: Moderate-sized hiatal hernia. No bowel obstruction. Large amount of stool within the rectum.  PERITONEUM: Small volume abdominopelvic ascites.  VESSELS: Atherosclerotic changes. Right femoral approach central venous catheter with tip terminating in the right common iliac vein.  RETROPERITONEUM/LYMPH NODES: No lymphadenopathy.  ABDOMINAL WALL: Anasarca.  BONES: Degenerative changes.    IMPRESSION:  No retroperitoneal hematoma.    Large amount of stool within the rectum.      Culture - Blood (07.31.20 @ 08:06)    Gram Stain:   Growth in aerobic bottle: Gram Positive Cocci in Clusters  Growth in anaerobic bottle: Gram Positive Cocci in Clusters    Specimen Source: .Blood Blood-Peripheral    Culture Results:   Growth in aerobic bottle: Staphylococcus epidermidis  "Susceptibilities not performed"  Growth in anaerobic bottle: Gram Positive Cocci in Clusters

## 2020-08-03 NOTE — PHYSICAL THERAPY INITIAL EVALUATION ADULT - PHYSICAL ASSIST/NONPHYSICAL ASSIST: CHAIR TO BED, REHAB EVAL
Chief Complaint   Patient presents with     RECHECK     Type 1 Diabetes     Capillary puncture performed for Hemoglobin A1C test. Patient tolerated well.    Nathaly Jennings MA     verbal cues

## 2020-08-03 NOTE — PROGRESS NOTE ADULT - SUBJECTIVE AND OBJECTIVE BOX
INTERVAL HPI/OVERNIGHT EVENTS: ***    PRESSORS: [ ] YES [ ] NO  WHICH:    ANTIBIOTICS:                  DATE STARTED:  ANTIBIOTICS:                  DATE STARTED:  ANTIBIOTICS:                  DATE STARTED:    Antimicrobial:  piperacillin/tazobactam IVPB.. 3.375 Gram(s) IV Intermittent every 12 hours    Cardiovascular:    Pulmonary:    Hematalogic:    Other:  chlorhexidine 2% Cloths 1 Application(s) Topical <User Schedule>  epoetin joey-epbx (RETACRIT) Injectable 4000 Unit(s) IV Push <User Schedule>  glucagon  Injectable 1 milliGRAM(s) IntraMuscular once PRN  HYDROmorphone  Injectable 1 milliGRAM(s) IV Push every 6 hours PRN  insulin lispro (HumaLOG) corrective regimen sliding scale   SubCutaneous Before meals and at bedtime  pantoprazole    Tablet 40 milliGRAM(s) Oral before breakfast    chlorhexidine 2% Cloths 1 Application(s) Topical <User Schedule>  epoetin joey-epbx (RETACRIT) Injectable 4000 Unit(s) IV Push <User Schedule>  glucagon  Injectable 1 milliGRAM(s) IntraMuscular once PRN  HYDROmorphone  Injectable 1 milliGRAM(s) IV Push every 6 hours PRN  insulin lispro (HumaLOG) corrective regimen sliding scale   SubCutaneous Before meals and at bedtime  pantoprazole    Tablet 40 milliGRAM(s) Oral before breakfast  piperacillin/tazobactam IVPB.. 3.375 Gram(s) IV Intermittent every 12 hours    Drug Dosing Weight  Height (cm): 157.48 (29 Jul 2020 14:57)  Weight (kg): 40.3 (31 Jul 2020 01:35)  BMI (kg/m2): 16.3 (31 Jul 2020 01:35)  BSA (m2): 1.35 (31 Jul 2020 01:35)    CENTRAL LINE: [ ] YES [ ] NO  LOCATION:         CANALES: [ ] YES [ ] NO          A-LINE:  [ ] YES [ ] NO  LOCATION:             ICU Vital Signs Last 24 Hrs  T(C): 36.4 (03 Aug 2020 04:00), Max: 36.4 (03 Aug 2020 04:00)  T(F): 97.6 (03 Aug 2020 04:00), Max: 97.6 (03 Aug 2020 04:00)  HR: 95 (03 Aug 2020 07:00) (60 - 99)  BP: 130/80 (03 Aug 2020 07:00) (110/72 - 175/98)  BP(mean): 91 (03 Aug 2020 07:00) (75 - 116)  ABP: --  ABP(mean): --  RR: 17 (03 Aug 2020 07:00) (8 - 21)  SpO2: 100% (03 Aug 2020 07:00) (95% - 100%)            08-02 @ 07:01  -  08-03 @ 07:00  --------------------------------------------------------  IN: 830 mL / OUT: 0 mL / NET: 830 mL              PHYSICAL EXAM:    GENERAL: NAD  EYES: EOMI, PERRLA,   NECK: Supple, No JVD; Normal thyroid; Trachea midline  NERVOUS SYSTEM:  Alert & Oriented X3,  Motor Strength 5/5 B/L upper and lower extremities; DTRs 2+ intact and symmetric  CHEST/LUNG: No rales, rhonchi, wheezing   HEART: Regular rate and rhythm; No murmurs,   ABDOMEN: Soft, Nontender, Nondistended; Bowel sounds present  EXTREMITIES:  2+ Peripheral Pulses, No clubbing, cyanosis, or edema        LABS:  CBC Full  -  ( 03 Aug 2020 06:37 )  WBC Count : 12.22 K/uL  RBC Count : 3.05 M/uL  Hemoglobin : 9.1 g/dL  Hematocrit : 26.9 %  Platelet Count - Automated : 303 K/uL  Mean Cell Volume : 88.2 fl  Mean Cell Hemoglobin : 29.8 pg  Mean Cell Hemoglobin Concentration : 33.8 gm/dL  Auto Neutrophil # : 9.67 K/uL  Auto Lymphocyte # : 1.35 K/uL  Auto Monocyte # : 0.89 K/uL  Auto Eosinophil # : 0.10 K/uL  Auto Basophil # : 0.07 K/uL  Auto Neutrophil % : 79.2 %  Auto Lymphocyte % : 11.0 %  Auto Monocyte % : 7.3 %  Auto Eosinophil % : 0.8 %  Auto Basophil % : 0.6 %    08-03    133<L>  |  96  |  12  ----------------------------<  84  3.5   |  27  |  3.65<H>    Ca    8.5      03 Aug 2020 06:37  Phos  2.3     08-03  Mg     1.8     08-03    TPro  5.3<L>  /  Alb  1.5<L>  /  TBili  0.8  /  DBili  x   /  AST  18  /  ALT  12  /  AlkPhos  211<H>  08-03        Culture Results:   Growth in aerobic and anaerobic bottles: Staphylococcus epidermidis  "Susceptibilities not performed" (07-31 @ 08:06)  Culture Results:   Growth in aerobic bottle: Staphylococcus epidermidis  "Susceptibilities not performed"  Growth in anaerobic bottle: Gram Positive Cocci in Clusters (07-31 @ 08:06)      RADIOLOGY & ADDITIONAL STUDIES REVIEWED:  ***    [ ]GOALS OF CARE DISCUSSION WITH PATIENT/FAMILY/PROXY:    CRITICAL CARE TIME SPENT: 35 minutes INTERVAL HPI/OVERNIGHT EVENTS: 70 yo F with PMHx of ESRD on HD, HTN, DM presented to the ED with L Arm pain, found to have pseudoaneurysm and AVG infection coag neg staph. Patient was taken to OR 7/30/20 for AVG excision and ligation. Pt was brought to ICU for septic shock and post op monitoring. Overnight patients BP on high side SBP 170s, hydralazine 5mg IVP was given. No other overnight events.     PRESSORS: [ ] YES [ x] NO      ANTIBIOTICS RECEIVED: zosyn 7/31-8/3, Vancomycin on hold due to high Vanco trough 36.3                      Antimicrobial:  piperacillin/tazobactam IVPB.. 3.375 Gram(s) IV Intermittent every 12 hours    Cardiovascular:    Pulmonary:    Hematalogic:    Other:  chlorhexidine 2% Cloths 1 Application(s) Topical <User Schedule>  epoetin joey-epbx (RETACRIT) Injectable 4000 Unit(s) IV Push <User Schedule>  glucagon  Injectable 1 milliGRAM(s) IntraMuscular once PRN  HYDROmorphone  Injectable 1 milliGRAM(s) IV Push every 6 hours PRN  insulin lispro (HumaLOG) corrective regimen sliding scale   SubCutaneous Before meals and at bedtime  pantoprazole    Tablet 40 milliGRAM(s) Oral before breakfast    chlorhexidine 2% Cloths 1 Application(s) Topical <User Schedule>  epoetin joey-epbx (RETACRIT) Injectable 4000 Unit(s) IV Push <User Schedule>  glucagon  Injectable 1 milliGRAM(s) IntraMuscular once PRN  HYDROmorphone  Injectable 1 milliGRAM(s) IV Push every 6 hours PRN  insulin lispro (HumaLOG) corrective regimen sliding scale   SubCutaneous Before meals and at bedtime  pantoprazole    Tablet 40 milliGRAM(s) Oral before breakfast  piperacillin/tazobactam IVPB.. 3.375 Gram(s) IV Intermittent every 12 hours    Drug Dosing Weight  Height (cm): 157.48 (29 Jul 2020 14:57)  Weight (kg): 40.3 (31 Jul 2020 01:35)  BMI (kg/m2): 16.3 (31 Jul 2020 01:35)  BSA (m2): 1.35 (31 Jul 2020 01:35)    CENTRAL LINE: [ x] YES [ ] NO  LOCATION:  Right femoral      CANALES: [ ] YES [x ] NO        A-LINE:  [ ] YES [ x] NO  LOCATION:         ICU Vital Signs Last 24 Hrs  T(C): 36.4 (03 Aug 2020 04:00), Max: 36.4 (03 Aug 2020 04:00)  T(F): 97.6 (03 Aug 2020 04:00), Max: 97.6 (03 Aug 2020 04:00)  HR: 95 (03 Aug 2020 07:00) (60 - 99)  BP: 130/80 (03 Aug 2020 07:00) (110/72 - 175/98)  BP(mean): 91 (03 Aug 2020 07:00) (75 - 116)  ABP: --  ABP(mean): --  RR: 17 (03 Aug 2020 07:00) (8 - 21)  SpO2: 100% (03 Aug 2020 07:00) (95% - 100%)        08-02 @ 07:01  -  08-03 @ 07:00  --------------------------------------------------------  IN: 830 mL / OUT: 0 mL / NET: 830 mL      PHYSICAL EXAM:    GENERAL: NAD, cachectic   EYES: EOMI, PERRLA,   NECK: Supple, No JVD; Normal thyroid; Trachea midline  NERVOUS SYSTEM:  Alert & Oriented X3,  Motor Strength 5/5 B/L upper and lower extremities; DTRs 2+ intact and symmetric, left arm with clean dressing  CHEST/LUNG: No rales, rhonchi, wheezing   HEART: Regular rate and rhythm; No murmurs,   ABDOMEN: Soft, Nontender, Nondistended; Bowel sounds present  EXTREMITIES:  2+ Peripheral Pulses, No clubbing, cyanosis, or edema  SKIN: intact, warm       LABS:  CBC Full  -  ( 03 Aug 2020 06:37 )  WBC Count : 12.22 K/uL  RBC Count : 3.05 M/uL  Hemoglobin : 9.1 g/dL  Hematocrit : 26.9 %  Platelet Count - Automated : 303 K/uL  Mean Cell Volume : 88.2 fl  Mean Cell Hemoglobin : 29.8 pg  Mean Cell Hemoglobin Concentration : 33.8 gm/dL  Auto Neutrophil # : 9.67 K/uL  Auto Lymphocyte # : 1.35 K/uL  Auto Monocyte # : 0.89 K/uL  Auto Eosinophil # : 0.10 K/uL  Auto Basophil # : 0.07 K/uL  Auto Neutrophil % : 79.2 %  Auto Lymphocyte % : 11.0 %  Auto Monocyte % : 7.3 %  Auto Eosinophil % : 0.8 %  Auto Basophil % : 0.6 %    08-03    133<L>  |  96  |  12  ----------------------------<  84  3.5   |  27  |  3.65<H>    Ca    8.5      03 Aug 2020 06:37  Phos  2.3     08-03  Mg     1.8     08-03    TPro  5.3<L>  /  Alb  1.5<L>  /  TBili  0.8  /  DBili  x   /  AST  18  /  ALT  12  /  AlkPhos  211<H>  08-03        Culture Results:   Growth in aerobic and anaerobic bottles: Staphylococcus epidermidis  "Susceptibilities not performed" (07-31 @ 08:06)  Culture Results:   Growth in aerobic bottle: Staphylococcus epidermidis  "Susceptibilities not performed"  Growth in anaerobic bottle: Gram Positive Cocci in Clusters (07-31 @ 08:06)      RADIOLOGY & ADDITIONAL STUDIES REVIEWED:  ***    [ ]GOALS OF CARE DISCUSSION WITH PATIENT/FAMILY/PROXY:    CRITICAL CARE TIME SPENT: 35 minutes

## 2020-08-03 NOTE — PHYSICAL THERAPY INITIAL EVALUATION ADULT - PHYSICAL ASSIST/NONPHYSICAL ASSIST: GAIT, REHAB EVAL
verbal cues Double O-Z Plasty Text: The defect edges were debeveled with a #15 scalpel blade.  Given the location of the defect, shape of the defect and the proximity to free margins a Double O-Z plasty (double transposition flap) was deemed most appropriate.  Using a sterile surgical marker, the appropriate transposition flaps were drawn incorporating the defect and placing the expected incisions within the relaxed skin tension lines where possible. The area thus outlined was incised deep to adipose tissue with a #15 scalpel blade.  The skin margins were undermined to an appropriate distance in all directions utilizing iris scissors.  Hemostasis was achieved with electrocautery.  The flaps were then transposed into place, one clockwise and the other counterclockwise, and anchored with interrupted buried subcutaneous sutures.

## 2020-08-03 NOTE — PROGRESS NOTE ADULT - ASSESSMENT
69F s/p ligation of L AVG pseudoaneurysm 7/30, rt fem shiley 7/30, moshe malfunctioning     - daily dressing changes with xeroform, dry gauze and kerlex  - neurovascular exams  - elevate LUE  - cont IV abx as per ID/Cxs  - moshe exchange at bedside   - cont care as per ICU

## 2020-08-03 NOTE — PROGRESS NOTE ADULT - SUBJECTIVE AND OBJECTIVE BOX
Problem List:  ESRD   Acute anemia, post blood transfusion Hemoglobin 9.1  Malnutrition  CT positive to lung nodules.  Appetite poor.    PAST MEDICAL & SURGICAL HISTORY:  DM (diabetes mellitus)  Vitamin D deficiency  ESRD (end stage renal disease)  Hyperphosphatemia  Hypertension  S/P bunionectomy: bilateral great toes  H/O hemorrhoidectomy  H/O oral cancer  AVF (arteriovenous fistula): x3      sulfADIAZINE (Angioedema)      MEDICATIONS  (STANDING):  chlorhexidine 2% Cloths 1 Application(s) Topical <User Schedule>  epoetin joey-epbx (RETACRIT) Injectable 4000 Unit(s) IV Push <User Schedule>  insulin lispro (HumaLOG) corrective regimen sliding scale   SubCutaneous Before meals and at bedtime  pantoprazole    Tablet 40 milliGRAM(s) Oral before breakfast    MEDICATIONS  (PRN):  glucagon  Injectable 1 milliGRAM(s) IntraMuscular once PRN Glucose <70 milliGRAM(s)/deciLiter  HYDROmorphone  Injectable 1 milliGRAM(s) IV Push every 6 hours PRN Severe Pain (7 - 10)                            9.1    12.22 )-----------( 303      ( 03 Aug 2020 06:37 )             26.9     08-03    133<L>  |  96  |  12  ----------------------------<  84  3.5   |  27  |  3.65<H>    Ca    8.5      03 Aug 2020 06:37  Phos  2.3     08-03  Mg     1.8     08-03    TPro  5.3<L>  /  Alb  1.5<L>  /  TBili  0.8  /  DBili  x   /  AST  18  /  ALT  12  /  AlkPhos  211<H>  08-03            REVIEW OF SYSTEMS:  c/ofatigue and malaise  Reduced Appetite        VITALS:  T(F): 97 (08-03-20 @ 08:00), Max: 97.6 (08-03-20 @ 04:00)  HR: 97 (08-03-20 @ 07:30)  BP: 130/81 (08-03-20 @ 07:30)  RR: 14 (08-03-20 @ 07:30)  SpO2: 100% (08-03-20 @ 07:30)  Wt(kg): --    08-02 @ 07:01 - 08-03 @ 07:00  --------------------------------------------------------  IN: 830 mL / OUT: 0 mL / NET: 830 mL    08-03 @ 07:01 - 08-03 @ 08:21  --------------------------------------------------------  IN: 0 mL / OUT: 0 mL / NET: 0 mL        PHYSICAL EXAM:  Constitutional: reduced muscle mass., no diaphoresis, no distress.  Neck: No JVD, no carotid bruit, supple, no adenopathy  Respiratory: Good air entrance B/L, no wheezes, rales or rhonchi  Cardiovascular: S1, S2, RRR, no pericardial rub, no murmur  Abdomen: BS+, soft, no tenderness, no bruit  Pelvis: bladder nondistended  No edema  Neurological: A/O x 3, no focal deficits    Vascular Access: right femoral cathetr  Thigh hematoma

## 2020-08-03 NOTE — PHYSICAL THERAPY INITIAL EVALUATION ADULT - DIAGNOSIS, PT EVAL
Patient presents w/ general body pain that limits patient's movements and function; presents w/ weakness and dec. balance

## 2020-08-03 NOTE — SWALLOW BEDSIDE ASSESSMENT ADULT - SWALLOW EVAL: DIAGNOSIS
Pt p/w impaired mastication ability and lengthy bolus formation 2/2 few natural teeth, and prolonged oral bolus holding likely 2/2 anxiety. Pharyngeal swallow is c/b delayed swallow trigger, multiple swallows, and throat clearing and cough seen at this exam.

## 2020-08-03 NOTE — PROGRESS NOTE ADULT - ASSESSMENT
ESRD post CT with contrast , to have dialysis today  Acute anemia , bleed from surgery site, and possible thigh hematoma post CATHETR PLACEMENT.  Malnutrition  Hypoalbuminemia    Dialysis today with zero  net fluid removal , 3 K bath  Hold Vancomycin, monitor T level  Follow BC .  Epogen 4000 TIW  CT chest when stable.  Surgery follow up

## 2020-08-03 NOTE — PHYSICAL THERAPY INITIAL EVALUATION ADULT - PERTINENT HX OF CURRENT PROBLEM, REHAB EVAL
Patient admitted from [place] due to swelling and pain on (L) UE; patient w/ h/o DM, Cellulitis of L arm, ESRD and HTN

## 2020-08-03 NOTE — PHYSICAL THERAPY INITIAL EVALUATION ADULT - GENERAL OBSERVATIONS, REHAB EVAL
Patient received in semi-supine; awake and alert; (L)UE wrapped in ace bandage, presents w/ edema to L hand and fingers. Patient presents  w/ general body ache.

## 2020-08-04 LAB
ALBUMIN SERPL ELPH-MCNC: 1.4 G/DL — LOW (ref 3.5–5)
ALP SERPL-CCNC: 190 U/L — HIGH (ref 40–120)
ALT FLD-CCNC: 12 U/L DA — SIGNIFICANT CHANGE UP (ref 10–60)
ANION GAP SERPL CALC-SCNC: 7 MMOL/L — SIGNIFICANT CHANGE UP (ref 5–17)
AST SERPL-CCNC: 19 U/L — SIGNIFICANT CHANGE UP (ref 10–40)
BILIRUB SERPL-MCNC: 0.5 MG/DL — SIGNIFICANT CHANGE UP (ref 0.2–1.2)
BUN SERPL-MCNC: 10 MG/DL — SIGNIFICANT CHANGE UP (ref 7–18)
CALCIUM SERPL-MCNC: 8.5 MG/DL — SIGNIFICANT CHANGE UP (ref 8.4–10.5)
CHLORIDE SERPL-SCNC: 101 MMOL/L — SIGNIFICANT CHANGE UP (ref 96–108)
CO2 SERPL-SCNC: 29 MMOL/L — SIGNIFICANT CHANGE UP (ref 22–31)
CREAT SERPL-MCNC: 3.43 MG/DL — HIGH (ref 0.5–1.3)
GLUCOSE BLDC GLUCOMTR-MCNC: 117 MG/DL — HIGH (ref 70–99)
GLUCOSE BLDC GLUCOMTR-MCNC: 77 MG/DL — SIGNIFICANT CHANGE UP (ref 70–99)
GLUCOSE BLDC GLUCOMTR-MCNC: 86 MG/DL — SIGNIFICANT CHANGE UP (ref 70–99)
GLUCOSE SERPL-MCNC: 72 MG/DL — SIGNIFICANT CHANGE UP (ref 70–99)
HCT VFR BLD CALC: 26.4 % — LOW (ref 34.5–45)
HGB BLD-MCNC: 8.9 G/DL — LOW (ref 11.5–15.5)
MAGNESIUM SERPL-MCNC: 1.8 MG/DL — SIGNIFICANT CHANGE UP (ref 1.6–2.6)
MCHC RBC-ENTMCNC: 30.3 PG — SIGNIFICANT CHANGE UP (ref 27–34)
MCHC RBC-ENTMCNC: 33.7 GM/DL — SIGNIFICANT CHANGE UP (ref 32–36)
MCV RBC AUTO: 89.8 FL — SIGNIFICANT CHANGE UP (ref 80–100)
NRBC # BLD: 0 /100 WBCS — SIGNIFICANT CHANGE UP (ref 0–0)
PHOSPHATE SERPL-MCNC: 2.1 MG/DL — LOW (ref 2.5–4.5)
PLATELET # BLD AUTO: 313 K/UL — SIGNIFICANT CHANGE UP (ref 150–400)
POTASSIUM SERPL-MCNC: 3.4 MMOL/L — LOW (ref 3.5–5.3)
POTASSIUM SERPL-SCNC: 3.4 MMOL/L — LOW (ref 3.5–5.3)
PROT SERPL-MCNC: 5.1 G/DL — LOW (ref 6–8.3)
RBC # BLD: 2.94 M/UL — LOW (ref 3.8–5.2)
RBC # FLD: 18.6 % — HIGH (ref 10.3–14.5)
SODIUM SERPL-SCNC: 137 MMOL/L — SIGNIFICANT CHANGE UP (ref 135–145)
WBC # BLD: 9.36 K/UL — SIGNIFICANT CHANGE UP (ref 3.8–10.5)
WBC # FLD AUTO: 9.36 K/UL — SIGNIFICANT CHANGE UP (ref 3.8–10.5)

## 2020-08-04 RX ORDER — SODIUM CHLORIDE 9 MG/ML
1000 INJECTION, SOLUTION INTRAVENOUS
Refills: 0 | Status: DISCONTINUED | OUTPATIENT
Start: 2020-08-04 | End: 2020-08-07

## 2020-08-04 RX ORDER — POTASSIUM PHOSPHATE, MONOBASIC POTASSIUM PHOSPHATE, DIBASIC 236; 224 MG/ML; MG/ML
15 INJECTION, SOLUTION INTRAVENOUS ONCE
Refills: 0 | Status: COMPLETED | OUTPATIENT
Start: 2020-08-04 | End: 2020-08-04

## 2020-08-04 RX ORDER — METOPROLOL TARTRATE 50 MG
12.5 TABLET ORAL
Refills: 0 | Status: DISCONTINUED | OUTPATIENT
Start: 2020-08-04 | End: 2020-08-07

## 2020-08-04 RX ORDER — ACETAMINOPHEN 500 MG
650 TABLET ORAL EVERY 6 HOURS
Refills: 0 | Status: DISCONTINUED | OUTPATIENT
Start: 2020-08-04 | End: 2020-08-07

## 2020-08-04 RX ADMIN — Medication 12.5 MILLIGRAM(S): at 17:04

## 2020-08-04 RX ADMIN — Medication 650 MILLIGRAM(S): at 23:59

## 2020-08-04 RX ADMIN — HYDROMORPHONE HYDROCHLORIDE 0.5 MILLIGRAM(S): 2 INJECTION INTRAMUSCULAR; INTRAVENOUS; SUBCUTANEOUS at 06:00

## 2020-08-04 RX ADMIN — Medication 12.5 MILLIGRAM(S): at 11:21

## 2020-08-04 RX ADMIN — CHLORHEXIDINE GLUCONATE 1 APPLICATION(S): 213 SOLUTION TOPICAL at 05:27

## 2020-08-04 RX ADMIN — Medication 650 MILLIGRAM(S): at 23:01

## 2020-08-04 RX ADMIN — HYDROMORPHONE HYDROCHLORIDE 0.5 MILLIGRAM(S): 2 INJECTION INTRAMUSCULAR; INTRAVENOUS; SUBCUTANEOUS at 12:09

## 2020-08-04 RX ADMIN — HYDROMORPHONE HYDROCHLORIDE 0.5 MILLIGRAM(S): 2 INJECTION INTRAMUSCULAR; INTRAVENOUS; SUBCUTANEOUS at 11:39

## 2020-08-04 RX ADMIN — POTASSIUM PHOSPHATE, MONOBASIC POTASSIUM PHOSPHATE, DIBASIC 62.5 MILLIMOLE(S): 236; 224 INJECTION, SOLUTION INTRAVENOUS at 16:48

## 2020-08-04 RX ADMIN — HYDROMORPHONE HYDROCHLORIDE 0.5 MILLIGRAM(S): 2 INJECTION INTRAMUSCULAR; INTRAVENOUS; SUBCUTANEOUS at 05:40

## 2020-08-04 RX ADMIN — PANTOPRAZOLE SODIUM 40 MILLIGRAM(S): 20 TABLET, DELAYED RELEASE ORAL at 05:27

## 2020-08-04 NOTE — PROGRESS NOTE ADULT - SUBJECTIVE AND OBJECTIVE BOX
Interval Events:    tolerating po intake  poc glucose without hypoglycemia  off sliding scale      Allergies    sulfADIAZINE (Angioedema)    Intolerances      Endocrine/Metabolic Medications:  glucagon  Injectable 1 milliGRAM(s) IntraMuscular once PRN      Vital Signs Last 24 Hrs  T(C): 37.1 (04 Aug 2020 04:00), Max: 37.1 (04 Aug 2020 04:00)  T(F): 98.8 (04 Aug 2020 04:00), Max: 98.8 (04 Aug 2020 04:00)  HR: 109 (04 Aug 2020 08:00) (79 - 163)  BP: 124/81 (04 Aug 2020 08:00) (100/76 - 146/94)  BP(mean): 91 (04 Aug 2020 08:00) (74 - 108)  RR: 21 (04 Aug 2020 08:00) (11 - 34)  SpO2: 93% (04 Aug 2020 08:00) (86% - 100%)      PHYSICAL EXAM  Constitutional:    NC/AT:    HEENT:    Neck:  No JVD  Respiratory:  reduced breath sounds b/l bases    Cardiovascular:  RR   Gastrointestinal: Soft     Extremities: without cyanosis      Neurological:  non focal    LABS                        8.9    9.36  )-----------( 313      ( 04 Aug 2020 06:15 )             26.4                               137    |  101    |  10                  Calcium: 8.5   / iCa: x      (08-04 @ 06:15)    ----------------------------<  72        Magnesium: 1.8                              3.4     |  29     |  3.43             Phosphorous: 2.1      TPro  5.1    /  Alb  1.4    /  TBili  0.5    /  DBili  x      /  AST  19     /  ALT  12     /  AlkPhos  190    04 Aug 2020 06:15    CAPILLARY BLOOD GLUCOSE      POCT Blood Glucose.: 100 mg/dL (03 Aug 2020 16:12)  POCT Blood Glucose.: 100 mg/dL (03 Aug 2020 11:22)        Assesment/plan        70yo female with multiple comorbidities including h/o HTN, DM type 2, ESRD on HD admitted with AV fistula swelling and pain    Endocrinology consulted for glycemic management    DM type 2  complicated by ESRD on HD, sepsis- bacteremia, requiring pressor current admit  NH regimen:  humalog sliding scale    recommendations:  fasting , prandial controlled  serum glucose tightly controlled    - continue off sliding scale  - reassess based on requirements  - goal inpatient glucose range: 140-180mg/dl    sepsis  bacteremia- staph epidermidis  on vancomycin  s/p AV repair/ligation  ID on board  on zosyn  required pressor    ESRD on HD  cautious insulin dosing  may need modified sliding scale    Discussed with primary team  Please call - Endocrine- Dr Mónica Cm as needed    Time spent evaluating patient including coordination of care 35mins.

## 2020-08-04 NOTE — PROGRESS NOTE ADULT - SUBJECTIVE AND OBJECTIVE BOX
INTERVAL HPI/OVERNIGHT EVENTS:  No acute events overnight. Pt resting comfortably, c/o left upper extremity pain.     MEDICATIONS  (STANDING):  chlorhexidine 2% Cloths 1 Application(s) Topical <User Schedule>  epoetin joey-epbx (RETACRIT) Injectable 4000 Unit(s) IV Push <User Schedule>  metoprolol tartrate 12.5 milliGRAM(s) Oral two times a day  polyethylene glycol 3350 17 Gram(s) Oral daily  senna 2 Tablet(s) Oral at bedtime    MEDICATIONS  (PRN):  acetaminophen   Tablet .. 650 milliGRAM(s) Oral every 6 hours PRN Mild Pain (1 - 3)  HYDROmorphone  Injectable 0.5 milliGRAM(s) IV Push every 6 hours PRN Moderate Pain (4 - 6)      Vital Signs Last 24 Hrs  T(C): 36.1 (04 Aug 2020 08:00), Max: 37.1 (04 Aug 2020 04:00)  T(F): 97 (04 Aug 2020 08:00), Max: 98.8 (04 Aug 2020 04:00)  HR: 90 (04 Aug 2020 10:00) (79 - 163)  BP: 122/79 (04 Aug 2020 10:00) (110/71 - 146/94)  BP(mean): 90 (04 Aug 2020 10:00) (80 - 108)  RR: 12 (04 Aug 2020 10:00) (11 - 34)  SpO2: 93% (04 Aug 2020 08:00) (86% - 100%)    Physical:  General: AAOx3, No acute distress  Extremities: anteromedial left upper extremity wound with no active bleeding or drainage; dressing taken down and wound redressed with xeroform, dry gauze and kerlex and ace wrap. hand edema present, 2+ radial and ulnar pulses palpable      LABS:                        8.9    9.36  )-----------( 313      ( 04 Aug 2020 06:15 )             26.4             08-04    137  |  101  |  10  ----------------------------<  72  3.4<L>   |  29  |  3.43<H>    Ca    8.5      04 Aug 2020 06:15  Phos  2.1     08-04  Mg     1.8     08-04    TPro  5.1<L>  /  Alb  1.4<L>  /  TBili  0.5  /  DBili  x   /  AST  19  /  ALT  12  /  AlkPhos  190<H>  08-04 INTERVAL HPI/OVERNIGHT EVENTS:  No acute events overnight. Pt resting comfortably, c/o left upper extremity pain.     MEDICATIONS  (STANDING):  chlorhexidine 2% Cloths 1 Application(s) Topical <User Schedule>  epoetin joey-epbx (RETACRIT) Injectable 4000 Unit(s) IV Push <User Schedule>  metoprolol tartrate 12.5 milliGRAM(s) Oral two times a day  polyethylene glycol 3350 17 Gram(s) Oral daily  senna 2 Tablet(s) Oral at bedtime    MEDICATIONS  (PRN):  acetaminophen   Tablet .. 650 milliGRAM(s) Oral every 6 hours PRN Mild Pain (1 - 3)  HYDROmorphone  Injectable 0.5 milliGRAM(s) IV Push every 6 hours PRN Moderate Pain (4 - 6)      Vital Signs Last 24 Hrs  T(C): 36.1 (04 Aug 2020 08:00), Max: 37.1 (04 Aug 2020 04:00)  T(F): 97 (04 Aug 2020 08:00), Max: 98.8 (04 Aug 2020 04:00)  HR: 90 (04 Aug 2020 10:00) (79 - 163)  BP: 122/79 (04 Aug 2020 10:00) (110/71 - 146/94)  BP(mean): 90 (04 Aug 2020 10:00) (80 - 108)  RR: 12 (04 Aug 2020 10:00) (11 - 34)  SpO2: 93% (04 Aug 2020 08:00) (86% - 100%)    Physical:  General: AAOx3, No acute distress  Extremities: anteromedial left upper extremity wound with no active bleeding or drainage; dressing taken down and wound redressed with xeroform, dry gauze and kerlex and ace wrap. hand edema present, 2+ radial and ulnar pulses palpable ; right groin shiley in place with sterile dressing; no signs of ecchymosis/erythema or hematoma; area soft, nontender to palpation; no signs of infection    LABS:                        8.9    9.36  )-----------( 313      ( 04 Aug 2020 06:15 )             26.4             08-04    137  |  101  |  10  ----------------------------<  72  3.4<L>   |  29  |  3.43<H>    Ca    8.5      04 Aug 2020 06:15  Phos  2.1     08-04  Mg     1.8     08-04    TPro  5.1<L>  /  Alb  1.4<L>  /  TBili  0.5  /  DBili  x   /  AST  19  /  ALT  12  /  AlkPhos  190<H>  08-04

## 2020-08-04 NOTE — PROGRESS NOTE ADULT - ATTENDING COMMENTS
IMP: This is a 69 yr old woman with  ESRD on HD, HTN, DM, Oral ca came in L Arm AVG infection coag neg staph and pseudoaneurysm, pt was taken to OR for AVG excision and ligation. Pt was brought to icu for post op monitoring due to post op shock requiring pressors     Assessment:  -  Septic Shock  -  Coag neg staph bacteremia   -  AVG infection s/p excision      AVG pseudoaneurysm s/p excision and ligation  7/31  -  ESRD on HD   -  DM uncontrol  -  Hyperphospheremia   -   HTN  -   Anemia       Plan:  -continue antibx  -f/u repeat BC.. positive  -Pat has a R Femoral dialysis cath  -ID f/u noted   -off iv pressors pressors   -continue  mitodrine   -Drop in H/H with obvious source of bleeding   -transfuse 1 unit PRBC   -CT abd /pelvis   -CT neck with contrast to evaluate for mass  -hemodynamic monitoring   -blood sugar control  -DVT/ GI prophy  -pain control.
IMP: This is a 69 yr old woman with  ESRD on HD, HTN, DM, Oral ca came in L Arm AVG infection coag neg staph and pseudoaneurysm, pt was taken to OR for AVG excision and ligation. Pt was brought to icu for post op monitoring due to post op shock requiring pressors     Assessment:  -  Septic Shock  -  Staph epidermidis bacteremia   -  AVG infection and pseuodaneurysm s/p excision and ligation  -  ESRD on HD   -  DM uncontrol  -  Hyperphospheremia   -  HTN  -  Anemia       Plan:  -continue antibiotics for now  -F/u repeat blood cultures  -HD via right femoral catheter, will likely need a permacath once bacteremia clears  -ID f/u noted, cont. antibiotics for now  -Monitor hgb, no obvious bleeding   -no cervical mass or collections on CT  -hemodynamic monitoring   -blood sugar control  -Swallow evaluation as endorsing throat pain with swallowing, was intubated for OR possibly related  -DVT/ GI prophy  -pain control.  - PT evaluation
IMP: This is a 69 yr old woman with  ESRD on HD, HTN, DM, Oral ca came in L Arm AVG infection coag neg staph and pseudoaneurysm, pt was taken to OR for AVG excision and ligation. Pt was brought to icu for post op monitoring due to post op shock requiring pressors     Assessment:  -  Septic Shock  -  Staph epidermidis bacteremia   -  AVG infection and pseuodaneurysm s/p excision and ligation  -  ESRD on HD   -  DM uncontrol  -  Hyperphospheremia   -  HTN  -  Anemia       Plan:  -continue antibiotics for now  -repeat blood cultures negative thus far   -HD via right femoral catheter  -IR consult for permacath placement  -ID f/u noted, cont. antibiotics for now  -Patient refused ELIZABETH  -Monitor hgb, no obvious bleeding   -no cervical mass or collections on CT, sore throat improving  -hemodynamic monitoring   -blood sugar control  -Swallow evaluation as endorsing throat pain with swallowing, was intubated for OR possibly related  -DVT/ GI prophy  -pain control.  - PT evaluation  -Transfer to medical service
I have discussed the case with the surgical house staff. I have personally seen, examined, and participated in the care of this patient. I have reviewed all pertinent clinical information.    s/p LUE infected AVG excision    - Abx  - Local wound care  - R shiley malfunctioning; will remove. Plan for new shiley placement for HD tomorrow.
IMP: This is a 69 yr old woman with  ESRD on HD, HTN, DM, Oral ca came in L Arm AVG infection coag neg staph and pseudoaneurysm, pt was taken to OR for AVG excision and ligation. Pt was brought to icu for post op monitoring due to post op shock requiring pressors     Assessment:  -  Septic Shock  -  Coag neg staph bacteremia   -  AVG infection s/p excision      AVG pseudoaneurysm s/p excision and ligation  7/31  -  ESRD on HD   -  DM uncontrol  -  Hyperphospheremia   -   HTN  -   Anemia       Plan:  -continue antibx  -f/u repeat BC neg  -ID f/u noted   -continue titrating pressors to maintain MAP>65  -started on mitodrine   -hemodynamic monitoring   -blood sugar control  -DVT/ GI prophy  -pain control.
IMP: This is a 69 yr old woman with  ESRD on HD, HTN, DM, Oral ca came in L Arm AVG infection coag neg staph and pseudoaneurysm, pt was taken to OR for AVG excision and ligation. Pt was brought to icu for post op monitoring due to post op shock requiring pressors     Assessment:  -  Septic Shock  -  Coag neg staph bacteremia   -  AVG infection s/p excision      AVG pseudoaneurysm s/p excision and ligation  7/31  -  ESRD on HD   -  DM uncontrol  -  Hyperphospheremia   -   HTN  -   Anemia       Plan:  -continue antibx  -continue titrating pressors to maintain MAP>65  -started on mitodrine   -hemodynamic monitoring   -blood sugar control  -DVT/ GI prophy  -Neurovascular check of laft hand q1h   -pain control

## 2020-08-04 NOTE — PROGRESS NOTE ADULT - ASSESSMENT
69 yr old F from [facility], with PMH of HTN, ESRD, DM, Hyperphosphatemia,  Oral cancer(s/p excision) , comes to the ED  with left arm AV fistula pain and swelling x1 week,sepsis due to staph epi, s/p ligation of AVF, acute anemia.  1.ICU monitoring.  2.Sepsis-ABX as per ID.  3.Acute anemia-s/p  PRBC.  4.ESRD-HD as per renal.  5.DM-Insulin.  6.Hypotension- resolved.  7.D/W Pt risk and benefit of ELIZABETH, she declines and will treat 4-6 of ABX.  8.GI and DVT prophylaxis.

## 2020-08-04 NOTE — PROGRESS NOTE ADULT - ASSESSMENT
Patient is a 69y old  Female from Loc[facility], with PMH of HTN, DM, Hyperphosphatemia,  Oral cancer(s/p excision), ESRD on HD via AVF  which created 3-4 years ago s/p exploration of AVF, interposition graft 6/2017, send in  to the ER for evaluation of  left arm AV fistula pain and swelling x1 week. Last week when she went for her HD session and developed gradual onset of swelling and pain at the AVF site on L arm. As per the patient the HD nurse had inserted the needle incorrectly that caused the swelling and severe pain. The AVF was not occluded and she had her HD sessions as per the schedule Tu/Th/Sat with her last HD session being yesterday.  On admission, she has no fever or Leukocytosis, but admission blood cultures growing GPC in clusters. She has started on Zosyn and Vancomycin and the ID consult requested to assist with further evaluation and antibiotic management.      # Bacteremia- 7/29/20- Coagulase Negative Staph. - ? source AVG - Repeat Blood Cx from 7/31 still positive- TTE as of 7/31 is negative for vegetation - Repeat BCx as of 8/2  NGTD  # Infected AVG with surrounding cellulitis - s/p ligation of Left  AVG pseudoaneurysm 7/30  # Septic shock- Post-op 7/30- transferred to ICU since requiring pressor    would recommend:    1. Obtain Vancomycin level now and re-dose if less than 30  2. Dose Vancomycin based on daily level   3. Wound care as per Vascular team  4. ELIZABETH since TTE is negative and AVG Cx is also negative, hence source is unclear   5. Continue bowel regimen to prevent stercoral colitis     d/w ICU team    Attending Attestation:    Spent more than 45 minutes on total encounter, more than 50 % of the visit was spent counseling and/or coordinating care by the Attending physician.

## 2020-08-04 NOTE — PROGRESS NOTE ADULT - SUBJECTIVE AND OBJECTIVE BOX
CHIEF COMPLAINT:Patient is a 69y old  Female who presents with a chief complaint of Painful swelling at AV fistula. Pt appears comfortable,    	  REVIEW OF SYSTEMS:  CONSTITUTIONAL: No fever, weight loss, or fatigue  EYES: No eye pain, visual disturbances, or discharge  ENT:  No difficulty hearing, tinnitus, vertigo; No sinus or throat pain  NECK: No pain or stiffness  RESPIRATORY: No cough, wheezing, chills or hemoptysis; No Shortness of Breath  CARDIOVASCULAR: No chest pain, palpitations, passing out, dizziness, or leg swelling  GASTROINTESTINAL: No abdominal or epigastric pain. No nausea, vomiting, or hematemesis; No diarrhea or constipation. No melena or hematochezia.  GENITOURINARY: No dysuria, frequency, hematuria, or incontinence  NEUROLOGICAL: No headaches, memory loss, loss of strength, numbness, or tremors  SKIN: No itching, burning, rashes, or lesions   LYMPH Nodes: No enlarged glands  ENDOCRINE: No heat or cold intolerance; No hair loss  MUSCULOSKELETAL: No joint pain or swelling; No muscle, back, or extremity pain  PSYCHIATRIC: No depression, anxiety, mood swings, or difficulty sleeping  HEME/LYMPH: No easy bruising, or bleeding gums  ALLERGY AND IMMUNOLOGIC: No hives or eczema	        PHYSICAL EXAM:  T(C): 37.1 (08-04-20 @ 04:00), Max: 37.1 (08-04-20 @ 04:00)  HR: 109 (08-04-20 @ 08:00) (79 - 163)  BP: 124/81 (08-04-20 @ 08:00) (100/76 - 146/94)  RR: 21 (08-04-20 @ 08:00) (11 - 34)  SpO2: 93% (08-04-20 @ 08:00) (86% - 100%)  Wt(kg): --  I&O's Summary    03 Aug 2020 07:01  -  04 Aug 2020 07:00  --------------------------------------------------------  IN: 480 mL / OUT: 549 mL / NET: -69 mL        Appearance: Normal	  HEENT:   Normal oral mucosa, PERRL, EOMI	  Lymphatic: No lymphadenopathy  Cardiovascular: Normal S1 S2, No JVD, No murmurs, No edema  Respiratory: Lungs clear to auscultation	  Psychiatry: A & O x 3, Mood & affect appropriate  Gastrointestinal:  Soft, Non-tender, + BS	  Skin: No rashes, No ecchymoses, No cyanosis	  Neurologic: Non-focal  Extremities: Normal range of motion, No clubbing, cyanosis or edema  Vascular: Peripheral pulses palpable 2+ bilaterally    MEDICATIONS  (STANDING):  chlorhexidine 2% Cloths 1 Application(s) Topical <User Schedule>  epoetin joey-epbx (RETACRIT) Injectable 4000 Unit(s) IV Push <User Schedule>  pantoprazole    Tablet 40 milliGRAM(s) Oral before breakfast  polyethylene glycol 3350 17 Gram(s) Oral daily  senna 2 Tablet(s) Oral at bedtime  	  	  LABS:	 	                      8.9    9.36  )-----------( 313      ( 04 Aug 2020 06:15 )             26.4     08-04    137  |  101  |  10  ----------------------------<  72  3.4<L>   |  29  |  3.43<H>    Ca    8.5      04 Aug 2020 06:15  Phos  2.1     08-04  Mg     1.8     08-04    TPro  5.1<L>  /  Alb  1.4<L>  /  TBili  0.5  /  DBili  x   /  AST  19  /  ALT  12  /  AlkPhos  190<H>  08-04      Lipid Profile: Cholesterol <50  LDL <24  HDL 11  TG 77      TSH: Thyroid Stimulating Hormone, Serum: 3.09 uU/mL (07-30 @ 06:31)    Culture - Blood (08.02.20 @ 11:14)    Specimen Source: .Blood Blood-Peripheral    Culture Results:   No growth to date.    Culture - Blood (07.31.20 @ 08:06)    Gram Stain:   Growth in aerobic bottle: Gram Positive Cocci in Clusters  Growth in anaerobic bottle: Gram Positive Cocci in Clusters    Specimen Source: .Blood Blood-Peripheral    Culture Results:   Growth in aerobic and anaerobic bottles: Staphylococcus epidermidis  "Susceptibilities not performed"

## 2020-08-04 NOTE — PROGRESS NOTE ADULT - SUBJECTIVE AND OBJECTIVE BOX
Patient is seen and examined at the bed side, is afebrile.  She is doing better, off pressor.  The WBC count mildly elevated. The repeat Blood culture from  is negative to date.        REVIEW OF SYSTEMS: All other review systems are negative        ALLERGIES: sulfADIAZINE (Angioedema)        ICU Vital Signs Last 24 Hrs  T(C): 36.8 (04 Aug 2020 13:00), Max: 37.1 (04 Aug 2020 04:00)  T(F): 98.3 (04 Aug 2020 13:00), Max: 98.8 (04 Aug 2020 04:00)  HR: 77 (04 Aug 2020 16:00) (77 - 163)  BP: 177/97 (04 Aug 2020 16:00) (110/71 - 177/97)  BP(mean): 116 (04 Aug 2020 16:00) (80 - 116)  ABP: --  ABP(mean): --  RR: 21 (04 Aug 2020 16:00) (11 - 34)  SpO2: 100% (04 Aug 2020 15:00) (86% - 100%)        PHYSICAL EXAM:  GENERAL: Not in distress   CHEST/LUNG:  Not using accessory muscles  HEART: s1 and s2 present  ABDOMEN:  Nontender and  Nondistended  EXTREMITIES: Left UE ACE bandage in placed, the swelling and tenderness is improving   CNS: Awake and Alert          LABS:                        8.9    9.36  )-----------( 313      ( 04 Aug 2020 06:15 )             26.4                           9.1    12.22 )-----------( 303      ( 03 Aug 2020 06:37 )             26.9                8.5    14.60 )-----------( 291      ( 01 Aug 2020 06:04 )             25.8       08-04    137  |  101  |  10  ----------------------------<  72  3.4<L>   |  29  |  3.43<H>    Ca    8.5      04 Aug 2020 06:15  Phos  2.1     08-04  Mg     1.8     -04    TPro  5.1<L>  /  Alb  1.4<L>  /  TBili  0.5  /  DBili  x   /  AST  19  /  ALT  12  /  AlkPhos  190<H>  08    08-03    133<L>  |  96  |  12  ----------------------------<  84  3.5   |  27  |  3.65<H>    Ca    8.5      03 Aug 2020 06:37  Phos  2.3     08  Mg     1.8         TPro  5.3<L>  /  Alb  1.5<L>  /  TBili  0.8  /  DBili  x   /  AST  18  /  ALT  12  /  AlkPhos  211<H>      PT/INR - ( 2020 16:23 )   PT: 15.1 sec;   INR: 1.31 ratio      PTT - ( 2020 16:23 )  PTT:35.4 sec        CAPILLARY BLOOD GLUCOSE  POCT Blood Glucose.: 114 mg/dL (2020 18:11)  POCT Blood Glucose.: 111 mg/dL (2020 12:15)  POCT Blood Glucose.: 102 mg/dL (2020 10:21)  POCT Blood Glucose.: 77 mg/dL (2020 09:09)  POCT Blood Glucose.: 56 mg/dL (2020 09:07)        Urinalysis Basic - ( 2020 17:02 )  Color: Red / Appearance: bloody / S.015 / pH: x  Gluc: x / Ketone: Negative  / Bili: Small / Urobili: Negative   Blood: x / Protein: 100 / Nitrite: Negative   Leuk Esterase: Moderate / RBC: >50 /HPF / WBC 11-25 /HPF   Sq Epi: x / Non Sq Epi: Few /HPF / Bacteria: Moderate /HPF        Vancomycin Level, Trough: 36.3: Vancomycin trough levels should be rapidly reached and maintained at  15-20 ug/ml for life threatening MRSA  infections such as sepsis, endocarditis, osteomyelitis and pneumonia.       Vancomycin Level, Trough (20 @ 15:53)    Vancomycin Level, Trough: 18.2: Vancomycin trough levels should be rapidly reached and maintained at  15-20 ug/ml for life threatening MRSA  infections such as sepsis, endocarditis, osteomyelitis and pneumonia.     Vancomycin Level, Trough (20 @ 08:16)    Vancomycin Level, Trough: 29.2: Vancomycin trough levels should be rapidly reached and maintained at  15-20 ug/ml for life threatening MRSA  infections such as sepsis, endocarditis, osteomyelitis and pneumonia.         MEDICATIONS  (STANDING):    chlorhexidine 2% Cloths 1 Application(s) Topical <User Schedule>  epoetin joey-epbx (RETACRIT) Injectable 4000 Unit(s) IV Push <User Schedule>  metoprolol tartrate 12.5 milliGRAM(s) Oral two times a day  polyethylene glycol 3350 17 Gram(s) Oral daily  potassium phosphate IVPB 15 milliMole(s) IV Intermittent once  senna 2 Tablet(s) Oral at bedtime      RADIOLOGY & ADDITIONAL TESTS:    20 : CT Abdomen and Pelvis No Cont (20 @ 14:11) No retroperitoneal hematoma.  Large amount of stool within the rectum.    20 : CT Neck Soft Tissue w/ IV Cont (20 @ 14:10) No cervical fluid collection. If malignancy is a strong clinical concern, PET CT exam recommended for further evaluation.    Multiple pulmonary nodules             < from: Transthoracic Echocardiogram (20 @ 08:49) >  1. Normal mitral valve.  2. Normal trileaflet aortic valve. Mild aortic  regurgitation.  3. Aortic Root: 2.7 cm.  4. Normal left atrium.  LA volume index = 26 cc/m2.  5. Mild concentric left ventricular hypertrophy.  6. Hyperdynamic left ventricular systolic function (EF  >70%).  7. Grade I diastolic dysfunction (Impaired relaxation).  8. Normal right atrium.  9. Normal right ventricular size and systolic function  (TAPSE  2.1cm).  10. Unable to estimate RVSP.  11. Normal tricuspid valve.  12. Normal pulmonic valve.  13. Normal pericardium with no pericardial effusion.    < end of copied text >    20: US Duplex Hemodialysis Access (20 @ 20:44) >  impression: The left upper extremity AVG (likely brachial artery-graft-cephalic vein) is patent. There is narrowing of the graft lumen due to the presence of thrombus, about 40% narrowing. 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      20: Xray Chest 1 View-PORTABLE IMMEDIATE (20 @ 17:54) The cardiac silhouette is within normal limits. The lungs are clear. No pleural abnormality.          MICROBIOLOGY DATA:      Culture - Blood (20 @ 11:14)    Specimen Source: .Blood Blood-Peripheral    Culture Results:   No growth to date.        Culture - Blood (20 @ 08:06)    Gram Stain:   Growth in aerobic bottle: Gram Positive Cocci in Clusters  Growth in anaerobic bottle: Gram Positive Cocci in Clusters    Specimen Source: .Blood Blood-Peripheral    Culture Results:   Growth in aerobic bottle: Staphylococcus epidermidis "Susceptibilities  not performed"  Growth in anaerobic bottle: Gram Positive Cocci in Clusters        Culture - Blood (20 @ 08:06)    Gram Stain:   Growth in aerobic bottle: Gram Positive Cocci in Clusters    Specimen Source: .Blood Blood-Peripheral    Culture Results:   Growth in aerobic bottle: Gram Positive Cocci in Clusters        Culture - Blood (20 @ 08:06)    Specimen Source: .Blood Blood-Peripheral    Culture Results:   No growth to date.      Culture - Surgical Swab (20 @ 06:41)    Specimen Source: .Surgical Swab left arm av graft C&S    Culture Results:   No growth      COVID-19  Antibody - for prior infection screening (20 @ 10:08)    COVID-19 IgG Antibody Index: 7.70: [employer] CMIA  Negative Result    <= 1.39 Index  Positive Result      >= 1.40 Index Index    COVID-19 IgG Antibody Interpretation: Positive: This test has not been reviewed by the FDA by the standard review  process. It has been authorized for emergency use by the FDA. [employer] has validated this test to be accurate.  Negative results do not rule out SARS-CoV-2 infection, particularly in  those who have been in recent contact with the virus. Follow-up testing  with a molecular diagnostic test should be considered to rule out  infection in these individuals.  Results from antibody testing should not be used as thesole basis to  diagnose or exclude SARS-CoV-2 infection, or to inform infection status.  Positive results may rarely be due to past or present infection with  non-SARS-CoV-2 coronavirus strains, such as coronavirus HKU1, NL63, OC43,  or 229E. [employer], through extensive validation  testing, has confirmed that this risk is minimal with this test.      Culture - Urine (20 @ 01:34)    Specimen Source: .Urine Clean Catch (Midstream)    Culture Results:   >=3 organisms. Probable collection contamination.      Culture - Blood (20 @ 23:02)    Gram Stain:   Growth in anaerobic bottle: Gram Positive Cocci in Clusters  Growth in aerobic bottle: Gram Positive Cocci in Clusters    -  Ampicillin/Sulbactam: R <=8/4    -  Cefazolin: R <=4    -  Clindamycin: R >4    -  Erythromycin: R >4    -  Gentamicin: R >8 Should not be used as monotherapy    -  Oxacillin: R >2    -  Penicillin: R 8    -  RIF- Rifampin: S <=1 Should not be used as monotherapy    -  Tetra/Doxy: S <=1    -  Trimethoprim/Sulfamethoxazole: R >2/38    -  Vancomycin: S 2    -  Coagulase negative Staphylococcus: Detec    Specimen Source: .Blood Blood-Peripheral    Organism: Blood Culture PCR    Organism: Staphylococcus epidermidis    Culture Results:   Growth in aerobic and anaerobic bottles: Staphylococcus epidermidis  "Due to technical problems, Proteus sp. will Not be reported as part of  the BCID panel until further notice"  ***Blood Panel PCR results on this specimen are available  approximately 3 hours after the Gram stain result.***  Gram stain, PCR, and/or culture results may not always  correspond due to difference in methodologies.  ************************************************************  This PCR assay was performed using [employer].  The following targets are tested for: Enterococcus,  vancomycin resistant enterococci, Listeria monocytogenes,  coagulase negative staphylococci, S. aureus,  methicillin resistant S. aureus, Streptococcus agalactiae  (Group B), S. pneumoniae, S. pyogenes (Group A),  Acinetobacter baumannii, Enterobacter cloacae, E. coli,  Klebsiella oxytoca, K. pneumoniae, Proteus sp.,  Serratia marcescens, Haemophilus influenzae,  Neisseria meningitidis, Pseudomonas aeruginosa, Candida  albicans, C. glabrata, C krusei, C parapsilosis,  C. tropicalis and the KPC resistance gene.    Organism Identification: Blood Culture PCR  Staphylococcus epidermidis    Method Type: PCR    Method Type: JOSÉ MIGUEL      Culture - Blood (20 @ 23:02)    Gram Stain:   Growth in anaerobic bottle: Gram Positive Cocci in Clusters  Growth in aerobic bottle: Gram Positive Cocci in Clusters    -  Coagulase negative Staphylococcus: Detec    Specimen Source: .Blood Blood-Peripheral    Organism: Blood Culture PCR    Culture Results:   Growth in aerobic and anaerobic bottles: Staphylococcus epidermidis  "Due to technical problems, Proteus sp. will Not be reported as part of  the BCID panel until further notice"  ***Blood Panel PCR results on this specimen are available  approximately 3 hours after the Gram stain result.***  Gram stain, PCR, and/or culture results may not always  correspond due to difference in methodologies.  ************************************************************  This PCR assay was performed using [employer].  The following targets are tested for: Enterococcus,  vancomycin resistant enterococci, Listeria monocytogenes,  coagulase negative staphylococci, S. aureus,  methicillin resistant S. aureus, Streptococcus agalactiae  (Group B), S. pneumoniae, S. pyogenes (Group A),  Acinetobacter baumannii, Enterobacter cloacae, E. coli,  Klebsiella oxytoca, K. pneumoniae, Proteus sp.,  Serratia marcescens, Haemophilus influenzae,  Neisseria meningitidis, Pseudomonas aeruginosa, Candida  albicans, C. glabrata, C krusei, C parapsilosis,  C. tropicalis and the KPC resistance gene.    Organism Identification: Blood Culture PCR    Method Type: PCR

## 2020-08-04 NOTE — PROGRESS NOTE ADULT - SUBJECTIVE AND OBJECTIVE BOX
Problem List:  ESRD  Post ligation of AVF.  Culture - Blood (08.02.20 @ 11:14)    Specimen Source: .Blood Blood-Peripheral    Culture Results:   No growth to date.        PAST MEDICAL & SURGICAL HISTORY:  DM (diabetes mellitus)  Vitamin D deficiency  ESRD (end stage renal disease)  Hyperphosphatemia  Hypertension  S/P bunionectomy: bilateral great toes  H/O hemorrhoidectomy  H/O oral cancer  AVF (arteriovenous fistula): x3      sulfADIAZINE (Angioedema)      MEDICATIONS  (STANDING):  chlorhexidine 2% Cloths 1 Application(s) Topical <User Schedule>  epoetin joey-epbx (RETACRIT) Injectable 4000 Unit(s) IV Push <User Schedule>  metoprolol tartrate 12.5 milliGRAM(s) Oral two times a day  polyethylene glycol 3350 17 Gram(s) Oral daily  senna 2 Tablet(s) Oral at bedtime    MEDICATIONS  (PRN):  acetaminophen   Tablet .. 650 milliGRAM(s) Oral every 6 hours PRN Mild Pain (1 - 3)  HYDROmorphone  Injectable 0.5 milliGRAM(s) IV Push every 6 hours PRN Moderate Pain (4 - 6)                            8.9    9.36  )-----------( 313      ( 04 Aug 2020 06:15 )             26.4     08-04    137  |  101  |  10  ----------------------------<  72  3.4<L>   |  29  |  3.43<H>    Ca    8.5      04 Aug 2020 06:15  Phos  2.1     08-04  Mg     1.8     08-04    TPro  5.1<L>  /  Alb  1.4<L>  /  TBili  0.5  /  DBili  x   /  AST  19  /  ALT  12  /  AlkPhos  190<H>  08-04            REVIEW OF SYSTEMS:  General: FEELS WEAK  EYES/ENT: No visual changes;  No vertigo, no headache.  NECK: No pain or stiffness  RESPIRATORY: No cough, wheezing, hemoptysis; No shortness of breath  CARDIOVASCULAR: No chest pain or palpitations. No Edema  GASTROINTESTINAL: No abdominal or epigastric pain. No nausea, vomiting. No diarrhea or constipation. No melena.  GENITOURINARY: No dysuria, frequency, foamy urine, urinary urgency, incontinence or hematuria, CANALES CATHETR PLACED        VITALS:  T(F): 98.3 (08-04-20 @ 13:00), Max: 98.8 (08-04-20 @ 04:00)  HR: 92 (08-04-20 @ 14:00)  BP: 149/97 (08-04-20 @ 14:00)  RR: 19 (08-04-20 @ 14:00)  SpO2: 95% (08-04-20 @ 14:00)  Wt(kg): --    08-03 @ 07:01  -  08-04 @ 07:00  --------------------------------------------------------  IN: 480 mL / OUT: 549 mL / NET: -69 mL    08-04 @ 07:01  -  08-04 @ 15:11  --------------------------------------------------------  IN: 200 mL / OUT: 0 mL / NET: 200 mL        PHYSICAL EXAM:  Constitutional: well developed, no diaphoresis, no distress.  Neck: No JVD, no carotid bruit, supple, no adenopathy  Respiratory: air entrance B/L, no wheezes, rales or rhonchi  Cardiovascular: S1, S2, RRR, no pericardial rub, no murmur  Abdomen: BS+, soft, no tenderness, no bruit  Pelvis: bladder nondistended  Extremities: No cyanosis or clubbing. No peripheral edema.

## 2020-08-04 NOTE — PROGRESS NOTE ADULT - SUBJECTIVE AND OBJECTIVE BOX
Patient is a 69y old  Female who presents with a chief complaint of Painful swelling at AV fistula. (03 Aug 2020 15:00)    pt seen in icu [ x ], reg med floor [   ], bed [ x ], chair at bedside [   ], a+o x3 [ x ], lethargic [  ],    nad [ x ]      pt s/p acute blood loss yesterday and transfused 1 unit prbc      Allergies    sulfADIAZINE (Angioedema)        Vitals    T(F): 98.8 (08-04-20 @ 04:00), Max: 98.8 (08-04-20 @ 04:00)  HR: 92 (08-04-20 @ 06:00) (78 - 163)  BP: 132/83 (08-04-20 @ 06:00) (100/76 - 146/94)  RR: 13 (08-04-20 @ 06:00) (10 - 34)  SpO2: 100% (08-04-20 @ 06:00) (86% - 100%)  Wt(kg): --  CAPILLARY BLOOD GLUCOSE      POCT Blood Glucose.: 100 mg/dL (03 Aug 2020 16:12)      Labs                          9.6    12.90 )-----------( 307      ( 03 Aug 2020 16:01 )             28.2       08-03    133<L>  |  96  |  12  ----------------------------<  84  3.5   |  27  |  3.65<H>    Ca    8.5      03 Aug 2020 06:37  Phos  2.3     08-03  Mg     1.8     08-03    TPro  5.3<L>  /  Alb  1.5<L>  /  TBili  0.8  /  DBili  x   /  AST  18  /  ALT  12  /  AlkPhos  211<H>  08-03            .Blood Blood-Peripheral  08-02 @ 11:14   No growth to date.  --  --      .Blood Blood-Peripheral  07-31 @ 08:06   Growth in aerobic and anaerobic bottles: Staphylococcus epidermidis  "Susceptibilities not performed"  --    Growth in aerobic bottle: Gram Positive Cocci in Clusters  Growth in anaerobic bottle: Gram Positive Cocci in Clusters      .Surgical Swab left arm av graft C&S  07-31 @ 06:41   Normal skin albin isolated  --  --      .Urine Clean Catch (Midstream)  07-30 @ 01:34   >=3 organisms. Probable collection contamination.  --  --      .Blood Blood-Peripheral  07-29 @ 23:02   Growth in aerobic and anaerobic bottles: Staphylococcus epidermidis  "Due to technical problems, Proteus sp. will Not be reported as part of  the BCID panel until further notice"  ***Blood Panel PCR results on this specimen are available  approximately 3 hours after the Gram stain result.***  Gram stain, PCR, and/or culture results may not always  correspond due to difference in methodologies.  ************************************************************  This PCR assay was performed using [employer].  The following targets are tested for: Enterococcus,  vancomycin resistant enterococci, Listeria monocytogenes,  coagulase negative staphylococci, S. aureus,  methicillin resistant S. aureus, Streptococcus agalactiae  (Group B), S. pneumoniae, S. pyogenes (Group A),  Acinetobacter baumannii, Enterobacter cloacae, E. coli,  Klebsiella oxytoca, K. pneumoniae, Proteus sp.,  Serratia marcescens, Haemophilus influenzae,  Neisseria meningitidis, Pseudomonas aeruginosa, Candida  albicans, C. glabrata, C krusei, C parapsilosis,  C. tropicalis and the KPC resistance gene.  --  Blood Culture PCR          Radiology Results      Meds    MEDICATIONS  (STANDING):  chlorhexidine 2% Cloths 1 Application(s) Topical <User Schedule>  epoetin joey-epbx (RETACRIT) Injectable 4000 Unit(s) IV Push <User Schedule>  pantoprazole    Tablet 40 milliGRAM(s) Oral before breakfast  polyethylene glycol 3350 17 Gram(s) Oral daily  senna 2 Tablet(s) Oral at bedtime      MEDICATIONS  (PRN):  glucagon  Injectable 1 milliGRAM(s) IntraMuscular once PRN Glucose <70 milliGRAM(s)/deciLiter  HYDROmorphone  Injectable 0.5 milliGRAM(s) IV Push every 6 hours PRN Moderate Pain (4 - 6)      Physical Exam    Neuro :  no focal deficits  Respiratory: CTA B/L  CV: RRR, S1S2, no murmurs,   Abdominal: Soft, NT, ND +BS,  Extremities: No edema, + peripheral pulses, left ue dressing cdi, distal forearm and hand edema,      ASSESSMENT      Infected prosthetic vascular graft LUE  Pseudoaneurysm of LUE arteriovenous graft  Cellulitis of left upper extremity    uti   a/p acute blood loss anemia  h/o ESRD on hd,   DM (diabetes mellitus)  Vitamin D deficiency  Hyperphosphatemia  Hypertension  S/P bunionectomy  hemorrhoidectomy  oral cancer  left AVF (arteriovenous fistula)      PLAN      s/p Excision, infected graft, extremity and Ligation of arteriovenous fistula or access graft of upper extremity 7/30/20  vasc surg f/u    cont pain control  cont wound care  gi/dvt prophylaxis  cont zosyn,   cont  vanco with hd  id f/u   blood cx with  Staphylococcus epidermidis noted above  f/u rept blood cx    Surgical Swab left arm av graft C&S with Normal skin albin isolated noted above   echo with Normal mitral valve. Normal trileaflet aortic valve. Mild aortic regurgitation.  Hyperdynamic left ventricular systolic function (EF >70%). Grade I diastolic dysfunction noted above  s/p right femoral shiley placement   ct abd pelv with No retroperitoneal hematoma. Large amount of stool within the rectum.  add senna 2 tabs qhs  add miralax daily  renal f/u   cont hd as per renal   pt off pressors  hgba1c 5.4   endo f/u   hss  cont current meds   mgmt as per icu Patient is a 69y old  Female who presents with a chief complaint of Painful swelling at AV fistula. (03 Aug 2020 15:00)    pt seen in icu [ x ], reg med floor [   ], bed [ x ], chair at bedside [   ], a+o x3 [ x ], lethargic [  ],    nad [ x ]      pt states feeling better      Allergies    sulfADIAZINE (Angioedema)        Vitals    T(F): 98.8 (08-04-20 @ 04:00), Max: 98.8 (08-04-20 @ 04:00)  HR: 92 (08-04-20 @ 06:00) (78 - 163)  BP: 132/83 (08-04-20 @ 06:00) (100/76 - 146/94)  RR: 13 (08-04-20 @ 06:00) (10 - 34)  SpO2: 100% (08-04-20 @ 06:00) (86% - 100%)  Wt(kg): --  CAPILLARY BLOOD GLUCOSE      POCT Blood Glucose.: 100 mg/dL (03 Aug 2020 16:12)      Labs                          9.6    12.90 )-----------( 307      ( 03 Aug 2020 16:01 )             28.2       08-03    133<L>  |  96  |  12  ----------------------------<  84  3.5   |  27  |  3.65<H>    Ca    8.5      03 Aug 2020 06:37  Phos  2.3     08-03  Mg     1.8     08-03    TPro  5.3<L>  /  Alb  1.5<L>  /  TBili  0.8  /  DBili  x   /  AST  18  /  ALT  12  /  AlkPhos  211<H>  08-03            .Blood Blood-Peripheral  08-02 @ 11:14   No growth to date.  --  --      .Blood Blood-Peripheral  07-31 @ 08:06   Growth in aerobic and anaerobic bottles: Staphylococcus epidermidis  "Susceptibilities not performed"  --    Growth in aerobic bottle: Gram Positive Cocci in Clusters  Growth in anaerobic bottle: Gram Positive Cocci in Clusters      .Surgical Swab left arm av graft C&S  07-31 @ 06:41   Normal skin albin isolated  --  --      .Urine Clean Catch (Midstream)  07-30 @ 01:34   >=3 organisms. Probable collection contamination.  --  --      .Blood Blood-Peripheral  07-29 @ 23:02   Growth in aerobic and anaerobic bottles: Staphylococcus epidermidis  "Due to technical problems, Proteus sp. will Not be reported as part of  the BCID panel until further notice"  ***Blood Panel PCR results on this specimen are available  approximately 3 hours after the Gram stain result.***  Gram stain, PCR, and/or culture results may not always  correspond due to difference in methodologies.  ************************************************************  This PCR assay was performed using [employer].  The following targets are tested for: Enterococcus,  vancomycin resistant enterococci, Listeria monocytogenes,  coagulase negative staphylococci, S. aureus,  methicillin resistant S. aureus, Streptococcus agalactiae  (Group B), S. pneumoniae, S. pyogenes (Group A),  Acinetobacter baumannii, Enterobacter cloacae, E. coli,  Klebsiella oxytoca, K. pneumoniae, Proteus sp.,  Serratia marcescens, Haemophilus influenzae,  Neisseria meningitidis, Pseudomonas aeruginosa, Candida  albicans, C. glabrata, C krusei, C parapsilosis,  C. tropicalis and the KPC resistance gene.  --  Blood Culture PCR          Radiology Results      < from: CT Neck Soft Tissue w/ IV Cont (08.02.20 @ 14:10) >  IMPRESSION:  No cervical fluid collection. If malignancy is a strong clinical concern, PET CT exam recommended for further evaluation.    Multiple pulmonary nodules as described above. CT chest recommended    < end of copied text >    Meds    MEDICATIONS  (STANDING):  chlorhexidine 2% Cloths 1 Application(s) Topical <User Schedule>  epoetin joey-epbx (RETACRIT) Injectable 4000 Unit(s) IV Push <User Schedule>  pantoprazole    Tablet 40 milliGRAM(s) Oral before breakfast  polyethylene glycol 3350 17 Gram(s) Oral daily  senna 2 Tablet(s) Oral at bedtime      MEDICATIONS  (PRN):  glucagon  Injectable 1 milliGRAM(s) IntraMuscular once PRN Glucose <70 milliGRAM(s)/deciLiter  HYDROmorphone  Injectable 0.5 milliGRAM(s) IV Push every 6 hours PRN Moderate Pain (4 - 6)      Physical Exam    Neuro :  no focal deficits  Respiratory: CTA B/L  CV: RRR, S1S2, no murmurs,   Abdominal: Soft, NT, ND +BS,  Extremities: No edema, + peripheral pulses, left ue dressing cdi, distal forearm and hand edema,      ASSESSMENT      Infected prosthetic vascular graft LUE  Pseudoaneurysm of LUE arteriovenous graft  Cellulitis of left upper extremity    uti   a/p acute blood loss anemia  h/o ESRD on hd,   DM (diabetes mellitus)  Vitamin D deficiency  Hyperphosphatemia  Hypertension  S/P bunionectomy  hemorrhoidectomy  oral cancer  left AVF (arteriovenous fistula)      PLAN      s/p Excision, infected graft, extremity and Ligation of arteriovenous fistula or access graft of upper extremity 7/30/20  vasc surg f/u    cont pain control  cont wound care  gi/dvt prophylaxis  cont zosyn,   id f/u   f/u Vancomycin level and re-dose if less than 30   Dose Vancomycin based on daily level   Wound care as per Vascular team  f/u ELIZABETH since TTE is negative and AVG Cx is also negative  blood cx with  Staphylococcus epidermidis noted above  rept blood cx  8/2 neg noted above  Surgical Swab left arm av graft C&S with Normal skin albin isolated noted above   echo with Normal mitral valve. Normal trileaflet aortic valve. Mild aortic regurgitation.  Hyperdynamic left ventricular systolic function (EF >70%). Grade I diastolic dysfunction noted above  cardio f/u   cta neck with No cervical fluid collection. If malignancy is a strong clinical concern, PET CT exam recommended for further evaluation. Multiple pulmonary nodules as described above. CT chest recommended noted above  ct abd pelv with No retroperitoneal hematoma. Large amount of stool within the rectum.   cont senna 2 tabs qhs  cont miralax daily   malfunctioning shiley s/p shiley exchange at bedside 8/3/20  renal f/u   post CTA with contrast s/p dialysis yesterday with zero  net fluid removal , 3 K bath  Acute anemia , bleed from surgery site, and possible thigh hematoma post CATHETR PLACEMENT.  Epogen 4000 TIW  pt off pressors  hgba1c 5.4   endo f/u   fasting , prandial controlled  serum glucose tightly controlled  low dose sliding scale stopped  reassess based on requirements  goal inpatient glucose range: 140-180mg/d   swallow eval noted and rec Puree solids with Nectar Thick liquids.  cont current meds   mgmt as per icu

## 2020-08-04 NOTE — PROGRESS NOTE ADULT - ASSESSMENT
68 yo F with PMHx of ESRD on HD, HTN, DM presented to the ED with L Arm pain, found to have pseudoaneurysm and AVG infection coag neg staph. Patient was taken to OR 7/30/20 for AVG excision and ligation. Pt was brought to ICU for septic shock and post op monitoring.       Septic shock resolved   Coag neg staph bacteremia, 3rd set of Bcx prelim negative   AVG infection s/p excision and ligation 7/30/20  AVG pseudoaneurysm s/p excision and ligation 7/30/20  ESRD on HD, last HD 8/3/20  DM  Hypoglycemia resolved    HTN  Anemia of chronic disease     CNS  # No issues  - AAOx3 at baseline  - Pain control with hydromorphone and Tylenol     CVS  # HTN  - Will start metoprolol 12.5 BID   - Monitor BP post dialysis (usually hypotensive)  - TTE no vegetations      Resp  # No issue   -lungs clear on CXR and on ascultation   -CT neck shows multiple lung nodules   -Incentive spirometer     GI  - D/c protonix   - Patient w/ dysphagia, CT of neck with no significant findings   - As per speech and swallow puree diet w/ nectar liquids   - C/w nepro TID   - CT abd/pelvis No retroperitoneal hematoma. Large amount of stool within the rectum.  - C/w senna and miralax     Renal  # ESRD on HD last HD was today 8/3/20, Vancomycin hold for now, Vanco trough 36.3  - F/u Vanco trough before HD   - Has right femoral Shiley, will be change by vascular surgery   - Monitor electrolytes    - f/u Dr. Flores    ID  # Bacteremia (coag neg staph) likely from AV graft infection   - s/p removal of AV graft on 7/30   - Last dose of Vanco was on 8/1/20, holding Vanco for now    - F/u pre dialysis vancomycin level, give vancomycin 1gm intra dialysis if vanc level <20   - Echo G1DD, EF 70%   - Third set of blood cultures prelim negative   - ID Dr. Boston     Vascular   # AVG pseudoaneurysm   - s/p AVG removal and ligation 7/30/20  - monitor for sings of bleeding   - Monitor CBC   - Check pulses q 2hrs   - IR consulted for Permacath   - F/u vascular surgeon Dr. Manzanares     Heme onco  # Anemia of chronic disease ,ESRD  - Hb today 8.9 <-- 9.1   - PRBC 2 units total    - epogen during dialysis   - Monitor cbc     Endo  # Hx of DM on hss at home   - Target CBG >140 and < 180  - A1C 5.4  - FS achs    MUSCULOSKELETAL  - PT GERARD     Prophylaxis   - Holding chemical DVT ppx for now given concern of bleeding, on SCD prophylaxis *   - GI : Protonix 70 yo F with PMHx of ESRD on HD, HTN, DM presented to the ED with L Arm pain, found to have pseudoaneurysm and AVG infection coag neg staph. Patient was taken to OR 7/30/20 for AVG excision and ligation. Pt was brought to ICU for septic shock and post op monitoring.       Septic shock resolved   Coag neg staph bacteremia, 3rd set of Bcx prelim negative   AVG infection s/p excision and ligation 7/30/20  AVG pseudoaneurysm s/p excision and ligation 7/30/20  ESRD on HD, last HD 8/3/20  DM  Hypoglycemia resolved    HTN  Anemia of chronic disease     CNS  # No issues  - AAOx3 at baseline  - Pain control with hydromorphone and Tylenol     CVS  # HTN  - Will start metoprolol 12.5 BID   - Monitor BP post dialysis (usually hypotensive)  - TTE no vegetations      Resp  # No issue   -lungs clear on CXR and on ascultation   -CT neck shows multiple lung nodules   -Incentive spirometer     GI  - D/c protonix   - Patient w/ dysphagia, CT of neck with no significant findings   - As per speech and swallow puree diet w/ nectar liquids   - C/w nepro TID   - CT abd/pelvis No retroperitoneal hematoma. Large amount of stool within the rectum.  - C/w senna and miralax     Renal  # ESRD on HD last HD was today 8/3/20, Vancomycin hold for now, Vanco trough 36.3  - F/u Vanco trough before HD   - Has right femoral Shiley, was not changed by vascular surgery   - Monitor electrolytes    - HD tomorrow 8/5/20  - F/u Dr. Flores    ID  # Bacteremia (coag neg staph) likely from AV graft infection   - s/p removal of AV graft on 7/30   - Last dose of Vanco was on 8/1/20, holding Vanco for now    - F/u pre dialysis vancomycin level, give vancomycin 1gm intra dialysis if vanc level <20   - Echo G1DD, EF 70%   - Third set of blood cultures prelim negative   - ID Dr. Boston     Vascular   # AVG pseudoaneurysm   - s/p AVG removal and ligation 7/30/20  - monitor for sings of bleeding   - Monitor CBC   - Check pulses q 2hrs   - IR consulted for Permacath   - F/u vascular surgeon Dr. Manzanares   - F/u IR, Permacath will be place once final results for Blood cultures are negative possibly tomorrow 8/5/20)     Heme onco  # Anemia of chronic disease ,ESRD  - Hb today 8.9 <-- 9.1   - PRBC 2 units total    - epogen during dialysis   - Monitor cbc     Endo  # Hx of DM on hss at home   - Target CBG >140 and < 180  - A1C 5.4  - FS achs    MUSCULOSKELETAL  - PT GERARD     Prophylaxis   - Holding chemical DVT ppx for now given concern of bleeding, on SCD prophylaxis *   - GI : Protonix 68 yo F with PMHx of ESRD on HD, HTN, DM presented to the ED with L Arm pain, found to have pseudoaneurysm and AVG infection coag neg staph. Patient was taken to OR 7/30/20 for AVG excision and ligation. Pt was brought to ICU for septic shock and post op monitoring.       Septic shock resolved   Coag neg staph bacteremia, 3rd set of Bcx prelim negative   AVG infection s/p excision and ligation 7/30/20  AVG pseudoaneurysm s/p excision and ligation 7/30/20  ESRD on HD, last HD 8/3/20  DM  Hypoglycemia resolved    HTN  Anemia of chronic disease     CNS  # No issues  - AAOx3 at baseline  - Pain control with hydromorphone and Tylenol     CVS  # HTN  - Will start metoprolol 12.5 BID   - Monitor BP post dialysis (usually hypotensive)  - TTE no vegetations      Resp  # No issue   -lungs clear on CXR and on ascultation   -CT neck shows multiple lung nodules   -Incentive spirometer     GI  - D/c protonix   - Patient w/ dysphagia, CT of neck with no significant findings   - As per speech and swallow puree diet w/ nectar liquids   - C/w nepro TID   - CT abd/pelvis No retroperitoneal hematoma. Large amount of stool within the rectum.  - C/w senna and miralax     Renal  # ESRD on HD last HD was today 8/3/20, Vancomycin hold for now, Vanco trough 36.3  - F/u Vanco trough before HD   - Has right femoral Shiley, was not changed by vascular surgery   - Monitor electrolytes    - HD tomorrow 8/5/20  - F/u Dr. Flores    ID  # Bacteremia (coag neg staph) likely from AV graft infection   - s/p removal of AV graft on 7/30   - Last dose of Vanco was on 8/1/20, holding Vanco for now    - F/u pre dialysis vancomycin level, give vancomycin 1gm intra dialysis if vanc level <20   - Echo G1DD, EF 70%   - Third set of blood cultures prelim negative   - ID Dr. Boston     Vascular   # AVG pseudoaneurysm   - s/p AVG removal and ligation 7/30/20  - monitor for sings of bleeding   - Monitor CBC   - Check pulses q 6hrs   - IR consulted for Permacath   - F/u vascular surgeon Dr. Manzanares   - F/u IR, Permacath will be place once final results for Blood cultures are negative possibly tomorrow 8/5/20)     Heme onco  # Anemia of chronic disease ,ESRD  - Hb today 8.9 <-- 9.1   - PRBC 2 units total    - epogen during dialysis   - Monitor cbc     Endo  # Hx of DM on hss at home   - Target CBG >140 and < 180  - A1C 5.4  - FS achs    MUSCULOSKELETAL  - PT GERARD     Prophylaxis   - Holding chemical DVT ppx for now given concern of bleeding, on SCD prophylaxis *   - GI : Protonix

## 2020-08-04 NOTE — PROGRESS NOTE ADULT - ASSESSMENT
ESRD post ligation of AVF.  In need for access in am for dialysis, blood culture form 08/02 are negative  H/H stable post blood transfusion    Malnutrition. Hypokalemia due to reduced PO intake.  Low PO4. Can supplement with K PHOS.  BP improved  Continue to follow nutrition  MVI daily, food supplements.    Next dialysis tomorrow after cathter placement

## 2020-08-04 NOTE — CHART NOTE - NSCHARTNOTEFT_GEN_A_CORE
70 yo F with PMHx of ESRD on HD, HTN, DM presented to the ED with L Arm pain, found to have pseudoaneurysm and AVG infection coag neg staph. Patient was taken to OR 7/30/20 for AVG excision and ligation. Pt was brought to ICU for septic shock and post op monitoring.       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 Pt NPO after MN due to shiley vs permacath placement on 8/5   - C/w nepro TID   - CT abd/pelvis No retroperitoneal hematoma. Large amount of stool within the rectum.  - C/w senna and miralax     Renal  # ESRD on HD last HD was on 8/3/20, Vancomycin hold for now, Vanco trough 36.3  - F/u Vanco trough before HD   - Has right femoral Shiley, was not changed by vascular surgery .Shiley vs permacath placement on 8/5  - Monitor electrolytes    - HD tomorrow 8/5/20 after Shiley vs Permacath placement  - F/u Dr. Flores    ID  # Bacteremia (coag neg staph) likely from AV graft infection   - s/p removal of AV graft on 7/30   - Last dose of Vanco was on 8/1/20, holding Vanco for now    - F/u pre dialysis vancomycin level, give vancomycin 1gm intra dialysis if vanc level <20   - Echo G1DD, EF 70%   - Third set of blood cultures prelim negative   - ID Dr. Boston     Vascular   # AVG pseudoaneurysm   - s/p AVG removal and ligation 7/30/20  - monitor for sings of bleeding   - Monitor CBC   - Check pulses q 6hrs   - IR consulted for Permacath   - F/u vascular surgeon Dr. Manzanares   - Liv vs permacath placement on 8/5    Heme onco  # Anemia of chronic disease ,ESRD  - Hb today 8.9 <-- 9.1   - PRBC 2 units total    - epogen during dialysis   - Monitor cbc     Endo  # Hx of DM on hss at home   - Target CBG >140 and < 180  - A1C 5.4  - FS achs    MUSCULOSKELETAL  - PT GERARD     Prophylaxis   - Holding chemical DVT ppx for now given concern of bleeding, on SCD prophylaxis     THINGS TO FOLLOW:  Monitor BP post HD   NPO after MN due to Liv vs Permacath placement   HD tomorrow, check Vanc trough before HD and dose Vancomycin during HD accordingly  f/u Final blood culture results  - GI : Protonix 68 yo F with PMHx of ESRD on HD, HTN, DM presented to the ED with L Arm pain, found to have pseudoaneurysm and AVG infection coag neg staph. Patient was taken to OR 7/30/20 for AVG excision and ligation. Pt was brought to ICU for septic shock and post op monitoring.       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 Pt NPO after MN due to shiley vs permacath placement on 8/5   - C/w nepro TID   - CT abd/pelvis No retroperitoneal hematoma. Large amount of stool within the rectum.  - C/w senna and miralax     Renal  # ESRD on HD last HD was on 8/3/20, Vancomycin hold for now, Vanco trough 36.3  - F/u Vanco trough before HD   - Has right femoral Shiley, was not changed by vascular surgery .Shiley vs permacath placement on 8/5  - Monitor electrolytes    - HD tomorrow 8/5/20 after Shiley vs Permacath placement  - F/u Dr. Flores    ID  # Bacteremia (coag neg staph) likely from AV graft infection   - s/p removal of AV graft on 7/30   - Last dose of Vanco was on 8/1/20, holding Vanco for now    - F/u pre dialysis vancomycin level, give vancomycin 1gm intra dialysis if vanc level <20   - Echo G1DD, EF 70%   - Third set of blood cultures prelim negative   - ID Dr. Boston     Vascular   # AVG pseudoaneurysm   - s/p AVG removal and ligation 7/30/20  - monitor for sings of bleeding   - Monitor CBC   - Check pulses q 6hrs   - IR consulted for Permacath   - F/u vascular surgeon Dr. Manzanares   - Liv vs permacath placement on 8/5    Heme onco  # Anemia of chronic disease ,ESRD  - Hb today 8.9 <-- 9.1   - PRBC 2 units total    - epogen during dialysis   - Monitor cbc     Endo  # Hx of DM on hss at home   - Target CBG >140 and < 180  - A1C 5.4  - FS achs    MUSCULOSKELETAL  - PT GERARD     Prophylaxis   - Holding chemical DVT ppx for now given concern of bleeding, on SCD prophylaxis   - GI : Protonix    Pt signed out to resident Dr. Nicolas and Internal Medicine Attending     THINGS TO FOLLOW:  Monitor BP post HD   NPO after MN due to Liv vs Permacath placement   HD tomorrow, check Vanc trough before HD and dose Vancomycin during HD accordingly  f/u Final blood culture results

## 2020-08-04 NOTE — PROGRESS NOTE ADULT - ASSESSMENT
69F s/p ligation of L AVG pseudoaneurysm 7/30, rt fem shiley 7/30 that is malfunctioning    - daily dressing changes with xeroform, dry gauze and kerlex  - neurovascular exams  - continue elevation of LUE  - cont IV abx as per ID/Cxs  - rt femoral shiley to be removed today  - will need dialysis 8/5/20 as per Dr. Dykes; plan for catheter placement 8/5/20  - cont care as per ICU 69F s/p ligation of L AVG pseudoaneurysm 7/30, rt fem shiley 7/30 that is malfunctioning    - daily dressing changes with xeroform, dry gauze and kerlex  - neurovascular exams  - continue elevation of LUE  - cont IV abx as per ID/Cxs  - rt femoral shiley to be removed today  - will need dialysis 8/5/20 as per Dr. Dykes; plan for IR permacath placement 8/5 vs. new shiley placement 8/5  - cont care as per ICU 69F s/p ligation of L AVG pseudoaneurysm 7/30, rt fem shiley 7/30 that is malfunctioning    - daily dressing changes with xeroform, dry gauze and kerlex  - neurovascular exams  - continue elevation of LUE  - cont IV abx as per ID/Cxs  - rt femoral shiley to be removed today  - will need dialysis 8/5/20 as per nephrology Dr. Dykes; plan for IR permacath placement 8/5 vs. new shiley placement 8/5  - cont care as per ICU

## 2020-08-04 NOTE — PROGRESS NOTE ADULT - SUBJECTIVE AND OBJECTIVE BOX
INTERVAL HPI/OVERNIGHT EVENTS: 70 yo F with PMHx of ESRD on HD, HTN, DM presented to the ED with L Arm pain, found to have pseudoaneurysm and AVG infection coag neg staph. Patient was taken to OR 7/30/20 for AVG excision and ligation. Pt was brought to ICU for septic shock and post op monitoring. Overnight patient was tachycardic, c/o pain, Dilaudid was given, improvement in HR, now on 80s. BP has been SBP 130s-140s, will resume BP meds.      PRESSORS: [ ] YES [ x] NO    ANTIBIOTICS RECEIVED: zosyn 7/31-8/3, Vancomycin on hold due to high Vanco trough 36.3 (8/3/20)                      Antimicrobial:    Cardiovascular:  metoprolol tartrate 12.5 milliGRAM(s) Oral two times a day    Pulmonary:    Hematalogic:    Other:  acetaminophen   Tablet .. 650 milliGRAM(s) Oral every 6 hours PRN  chlorhexidine 2% Cloths 1 Application(s) Topical <User Schedule>  epoetin joey-epbx (RETACRIT) Injectable 4000 Unit(s) IV Push <User Schedule>  HYDROmorphone  Injectable 0.5 milliGRAM(s) IV Push every 6 hours PRN  polyethylene glycol 3350 17 Gram(s) Oral daily  senna 2 Tablet(s) Oral at bedtime    acetaminophen   Tablet .. 650 milliGRAM(s) Oral every 6 hours PRN  chlorhexidine 2% Cloths 1 Application(s) Topical <User Schedule>  epoetin joey-epbx (RETACRIT) Injectable 4000 Unit(s) IV Push <User Schedule>  HYDROmorphone  Injectable 0.5 milliGRAM(s) IV Push every 6 hours PRN  metoprolol tartrate 12.5 milliGRAM(s) Oral two times a day  polyethylene glycol 3350 17 Gram(s) Oral daily  senna 2 Tablet(s) Oral at bedtime    Drug Dosing Weight  Height (cm): 157.48 (29 Jul 2020 14:57)  Weight (kg): 40.3 (31 Jul 2020 01:35)  BMI (kg/m2): 16.3 (31 Jul 2020 01:35)  BSA (m2): 1.35 (31 Jul 2020 01:35)    CENTRAL LINE: [x ] YES [ ] NO  LOCATION: Right femoral     CANALES: [ ] YES [x ] NO        A-LINE:  [ ] YES [x ] NO  LOCATION:         ICU Vital Signs Last 24 Hrs  T(C): 37.1 (04 Aug 2020 04:00), Max: 37.1 (04 Aug 2020 04:00)  T(F): 98.8 (04 Aug 2020 04:00), Max: 98.8 (04 Aug 2020 04:00)  HR: 90 (04 Aug 2020 10:00) (79 - 163)  BP: 122/79 (04 Aug 2020 10:00) (100/76 - 146/94)  BP(mean): 90 (04 Aug 2020 10:00) (74 - 108)  ABP: --  ABP(mean): --  RR: 12 (04 Aug 2020 10:00) (11 - 34)  SpO2: 93% (04 Aug 2020 08:00) (86% - 100%)      08-03 @ 07:01  -  08-04 @ 07:00  --------------------------------------------------------  IN: 480 mL / OUT: 549 mL / NET: -69 mL      PHYSICAL EXAM:    GENERAL: NAD, cachectic   EYES: EOMI, PERRLA,   NECK: Supple, No JVD; Normal thyroid; Trachea midline  NERVOUS SYSTEM:  Alert & Oriented X3,  Motor Strength 5/5 B/L upper and lower extremities; DTRs 2+ intact and symmetric, left arm with clean dressing  CHEST/LUNG: No rales, rhonchi, wheezing   HEART: Regular rate and rhythm; No murmurs,   ABDOMEN: Soft, Nontender, Nondistended; Bowel sounds present  EXTREMITIES:  2+ Peripheral Pulses, No clubbing, cyanosis, or edema  SKIN: intact, warm       LABS:  CBC Full  -  ( 04 Aug 2020 06:15 )  WBC Count : 9.36 K/uL  RBC Count : 2.94 M/uL  Hemoglobin : 8.9 g/dL  Hematocrit : 26.4 %  Platelet Count - Automated : 313 K/uL  Mean Cell Volume : 89.8 fl  Mean Cell Hemoglobin : 30.3 pg  Mean Cell Hemoglobin Concentration : 33.7 gm/dL  Auto Neutrophil # : x  Auto Lymphocyte # : x  Auto Monocyte # : x  Auto Eosinophil # : x  Auto Basophil # : x  Auto Neutrophil % : x  Auto Lymphocyte % : x  Auto Monocyte % : x  Auto Eosinophil % : x  Auto Basophil % : x    08-04    137  |  101  |  10  ----------------------------<  72  3.4<L>   |  29  |  3.43<H>    Ca    8.5      04 Aug 2020 06:15  Phos  2.1     08-04  Mg     1.8     08-04    TPro  5.1<L>  /  Alb  1.4<L>  /  TBili  0.5  /  DBili  x   /  AST  19  /  ALT  12  /  AlkPhos  190<H>  08-04        Culture Results:   No growth to date. (08-02 @ 11:14)      RADIOLOGY & ADDITIONAL STUDIES REVIEWED:  ***    [ ]GOALS OF CARE DISCUSSION WITH PATIENT/FAMILY/PROXY:    CRITICAL CARE TIME SPENT: 35 minutes

## 2020-08-05 DIAGNOSIS — A41.9 SEPSIS, UNSPECIFIED ORGANISM: ICD-10-CM

## 2020-08-05 DIAGNOSIS — R78.81 BACTEREMIA: ICD-10-CM

## 2020-08-05 LAB
ALBUMIN SERPL ELPH-MCNC: 1.8 G/DL — LOW (ref 3.5–5)
ALP SERPL-CCNC: 241 U/L — HIGH (ref 40–120)
ALT FLD-CCNC: 17 U/L DA — SIGNIFICANT CHANGE UP (ref 10–60)
ANION GAP SERPL CALC-SCNC: 6 MMOL/L — SIGNIFICANT CHANGE UP (ref 5–17)
ANION GAP SERPL CALC-SCNC: 8 MMOL/L — SIGNIFICANT CHANGE UP (ref 5–17)
AST SERPL-CCNC: 23 U/L — SIGNIFICANT CHANGE UP (ref 10–40)
BASOPHILS # BLD AUTO: 0.07 K/UL — SIGNIFICANT CHANGE UP (ref 0–0.2)
BASOPHILS NFR BLD AUTO: 0.7 % — SIGNIFICANT CHANGE UP (ref 0–2)
BILIRUB SERPL-MCNC: 0.5 MG/DL — SIGNIFICANT CHANGE UP (ref 0.2–1.2)
BLD GP AB SCN SERPL QL: SIGNIFICANT CHANGE UP
BUN SERPL-MCNC: 14 MG/DL — SIGNIFICANT CHANGE UP (ref 7–18)
BUN SERPL-MCNC: 6 MG/DL — LOW (ref 7–18)
CALCIUM SERPL-MCNC: 8.3 MG/DL — LOW (ref 8.4–10.5)
CALCIUM SERPL-MCNC: 9.5 MG/DL — SIGNIFICANT CHANGE UP (ref 8.4–10.5)
CHLORIDE SERPL-SCNC: 101 MMOL/L — SIGNIFICANT CHANGE UP (ref 96–108)
CHLORIDE SERPL-SCNC: 103 MMOL/L — SIGNIFICANT CHANGE UP (ref 96–108)
CO2 SERPL-SCNC: 28 MMOL/L — SIGNIFICANT CHANGE UP (ref 22–31)
CO2 SERPL-SCNC: 30 MMOL/L — SIGNIFICANT CHANGE UP (ref 22–31)
CREAT SERPL-MCNC: 2.53 MG/DL — HIGH (ref 0.5–1.3)
CREAT SERPL-MCNC: 4.6 MG/DL — HIGH (ref 0.5–1.3)
CULTURE RESULTS: SIGNIFICANT CHANGE UP
EOSINOPHIL # BLD AUTO: 0.07 K/UL — SIGNIFICANT CHANGE UP (ref 0–0.5)
EOSINOPHIL NFR BLD AUTO: 0.7 % — SIGNIFICANT CHANGE UP (ref 0–6)
GLUCOSE BLDC GLUCOMTR-MCNC: 115 MG/DL — HIGH (ref 70–99)
GLUCOSE BLDC GLUCOMTR-MCNC: 131 MG/DL — HIGH (ref 70–99)
GLUCOSE BLDC GLUCOMTR-MCNC: 83 MG/DL — SIGNIFICANT CHANGE UP (ref 70–99)
GLUCOSE BLDC GLUCOMTR-MCNC: 87 MG/DL — SIGNIFICANT CHANGE UP (ref 70–99)
GLUCOSE SERPL-MCNC: 79 MG/DL — SIGNIFICANT CHANGE UP (ref 70–99)
GLUCOSE SERPL-MCNC: 91 MG/DL — SIGNIFICANT CHANGE UP (ref 70–99)
HCT VFR BLD CALC: 32.7 % — LOW (ref 34.5–45)
HGB BLD-MCNC: 10.6 G/DL — LOW (ref 11.5–15.5)
IMM GRANULOCYTES NFR BLD AUTO: 0.7 % — SIGNIFICANT CHANGE UP (ref 0–1.5)
INR BLD: 1.39 RATIO — HIGH (ref 0.88–1.16)
LYMPHOCYTES # BLD AUTO: 1.62 K/UL — SIGNIFICANT CHANGE UP (ref 1–3.3)
LYMPHOCYTES # BLD AUTO: 16 % — SIGNIFICANT CHANGE UP (ref 13–44)
MAGNESIUM SERPL-MCNC: 2 MG/DL — SIGNIFICANT CHANGE UP (ref 1.6–2.6)
MCHC RBC-ENTMCNC: 29.9 PG — SIGNIFICANT CHANGE UP (ref 27–34)
MCHC RBC-ENTMCNC: 32.4 GM/DL — SIGNIFICANT CHANGE UP (ref 32–36)
MCV RBC AUTO: 92.4 FL — SIGNIFICANT CHANGE UP (ref 80–100)
MONOCYTES # BLD AUTO: 0.42 K/UL — SIGNIFICANT CHANGE UP (ref 0–0.9)
MONOCYTES NFR BLD AUTO: 4.1 % — SIGNIFICANT CHANGE UP (ref 2–14)
MRSA PCR RESULT.: SIGNIFICANT CHANGE UP
NEUTROPHILS # BLD AUTO: 7.88 K/UL — HIGH (ref 1.8–7.4)
NEUTROPHILS NFR BLD AUTO: 77.8 % — HIGH (ref 43–77)
NRBC # BLD: 0 /100 WBCS — SIGNIFICANT CHANGE UP (ref 0–0)
PHOSPHATE SERPL-MCNC: 3.9 MG/DL — SIGNIFICANT CHANGE UP (ref 2.5–4.5)
PLATELET # BLD AUTO: 406 K/UL — HIGH (ref 150–400)
POTASSIUM SERPL-MCNC: 4.1 MMOL/L — SIGNIFICANT CHANGE UP (ref 3.5–5.3)
POTASSIUM SERPL-MCNC: 4.2 MMOL/L — SIGNIFICANT CHANGE UP (ref 3.5–5.3)
POTASSIUM SERPL-SCNC: 4.1 MMOL/L — SIGNIFICANT CHANGE UP (ref 3.5–5.3)
POTASSIUM SERPL-SCNC: 4.2 MMOL/L — SIGNIFICANT CHANGE UP (ref 3.5–5.3)
PROT SERPL-MCNC: 6.7 G/DL — SIGNIFICANT CHANGE UP (ref 6–8.3)
PROTHROM AB SERPL-ACNC: 16 SEC — HIGH (ref 10.6–13.6)
RBC # BLD: 3.54 M/UL — LOW (ref 3.8–5.2)
RBC # FLD: 19.8 % — HIGH (ref 10.3–14.5)
S AUREUS DNA NOSE QL NAA+PROBE: DETECTED
SODIUM SERPL-SCNC: 137 MMOL/L — SIGNIFICANT CHANGE UP (ref 135–145)
SODIUM SERPL-SCNC: 139 MMOL/L — SIGNIFICANT CHANGE UP (ref 135–145)
SPECIMEN SOURCE: SIGNIFICANT CHANGE UP
SURGICAL PATHOLOGY STUDY: SIGNIFICANT CHANGE UP
VANCOMYCIN TROUGH SERPL-MCNC: 29.8 UG/ML — CRITICAL HIGH (ref 10–20)
WBC # BLD: 10.13 K/UL — SIGNIFICANT CHANGE UP (ref 3.8–10.5)
WBC # FLD AUTO: 10.13 K/UL — SIGNIFICANT CHANGE UP (ref 3.8–10.5)

## 2020-08-05 PROCEDURE — 76937 US GUIDE VASCULAR ACCESS: CPT | Mod: 26

## 2020-08-05 PROCEDURE — 36558 INSERT TUNNELED CV CATH: CPT

## 2020-08-05 PROCEDURE — 77001 FLUOROGUIDE FOR VEIN DEVICE: CPT | Mod: 26

## 2020-08-05 RX ORDER — CHLORHEXIDINE GLUCONATE 213 G/1000ML
1 SOLUTION TOPICAL DAILY
Refills: 0 | Status: DISCONTINUED | OUTPATIENT
Start: 2020-08-06 | End: 2020-08-07

## 2020-08-05 RX ADMIN — Medication 12.5 MILLIGRAM(S): at 06:11

## 2020-08-05 RX ADMIN — Medication 12.5 MILLIGRAM(S): at 19:20

## 2020-08-05 RX ADMIN — CHLORHEXIDINE GLUCONATE 1 APPLICATION(S): 213 SOLUTION TOPICAL at 06:11

## 2020-08-05 RX ADMIN — HYDROMORPHONE HYDROCHLORIDE 0.5 MILLIGRAM(S): 2 INJECTION INTRAMUSCULAR; INTRAVENOUS; SUBCUTANEOUS at 22:53

## 2020-08-05 RX ADMIN — HYDROMORPHONE HYDROCHLORIDE 0.5 MILLIGRAM(S): 2 INJECTION INTRAMUSCULAR; INTRAVENOUS; SUBCUTANEOUS at 23:50

## 2020-08-05 RX ADMIN — ERYTHROPOIETIN 4000 UNIT(S): 10000 INJECTION, SOLUTION INTRAVENOUS; SUBCUTANEOUS at 18:13

## 2020-08-05 NOTE — PROGRESS NOTE ADULT - SUBJECTIVE AND OBJECTIVE BOX
Interventional Radiology Central Line Insertion    Patient and/or family/surrogate educated about central line-associated blood stream infection prevention practices  Central Line insertion checklist utilized    Catheter Type: ( X ) Dialysis  (  ) Introducer  (   ) Central venous catheter   (  ) peripherally inserted central catheter (PICC)      Number of Lumens: (  ) single   ( X ) Double   (   ) Triple  (  ) Antimicrobial catheter    TIME OUT performed prior to this procedure with verbal confirmation of correct patient identity, correct site, side and siomara (if applicable), agreement of the procedure, correct patient position, availability of necessary equipment.  The consent form (if applicable) is complete and accurate. Safety precautions based on patient history or medication use has been addressed.   RN at bedside.    Procedure:  The patient was placed in the ( X ) Supine position, (  ) Trendelenburg position.  The patient's placement site was prepped with Chlorhexidine solution then draped using maximum sterile barrier protection.  The area was injected with __11___ cc of 1% Lidocaine.  Using the (  ) Seldinger technique  ( X ) Modified Seldinger technique  ( X ) Ultrasound, the catheter was introduced.  Strict adherence to outlined aseptic technique, including hand washing prior to donning sterile barriers (gloves and gown), utilizing a mask and cap, plus draping the patient with a sterile drape was observed.  Upon completion of the line placement, the insertion site was covered with sterile dressing.    All materials used for catheter insertion, including the intact guidewire, were accounted for at the end of the procedure.    Emergency placement ( X ) No    (   ) Yes   ( X  ) New Site    (   ) Guide Wire change    Attempted site and actual site ( X  ) Right  (   ) Left      Jugular Vein     Post Procedure (Catheter Placement Verification)  Correct placement confirmed by pressure transducer or ultrasonography or venous saturation.  The tip of the catheter is confirmed to be in appropriate position by radiography which revealed no pneumothorax. Line may be accessed.

## 2020-08-05 NOTE — PROGRESS NOTE ADULT - PROBLEM SELECTOR PLAN 6
# Anemia of chronic disease ,ESRD  - Hb today 8.9 <-- 9.1   - PRBC 2 units total    - epogen during dialysis   - Monitor cbc

## 2020-08-05 NOTE — PROGRESS NOTE ADULT - SUBJECTIVE AND OBJECTIVE BOX
Problem List:  ESRD  Ligation of AVF  Await central line for dialysis today    PAST MEDICAL & SURGICAL HISTORY:  DM (diabetes mellitus)  Vitamin D deficiency  ESRD (end stage renal disease)  Hyperphosphatemia  Hypertension  S/P bunionectomy: bilateral great toes  H/O hemorrhoidectomy  H/O oral cancer  AVF (arteriovenous fistula): x3      sulfADIAZINE (Angioedema)      MEDICATIONS  (STANDING):  chlorhexidine 2% Cloths 1 Application(s) Topical <User Schedule>  dextrose 5% + sodium chloride 0.9%. 1000 milliLiter(s) (50 mL/Hr) IV Continuous <Continuous>  epoetin joey-epbx (RETACRIT) Injectable 4000 Unit(s) IV Push <User Schedule>  metoprolol tartrate 12.5 milliGRAM(s) Oral two times a day    MEDICATIONS  (PRN):  acetaminophen   Tablet .. 650 milliGRAM(s) Oral every 6 hours PRN Mild Pain (1 - 3)  HYDROmorphone  Injectable 0.5 milliGRAM(s) IV Push every 6 hours PRN Moderate Pain (4 - 6)                            10.6   10.13 )-----------( 406      ( 05 Aug 2020 10:35 )             32.7     08-05    137  |  101  |  14  ----------------------------<  79  4.2   |  28  |  4.60<H>    Ca    9.5      05 Aug 2020 10:35  Phos  3.9     08-05  Mg     2.0     08-05    TPro  6.7  /  Alb  1.8<L>  /  TBili  0.5  /  DBili  x   /  AST  23  /  ALT  17  /  AlkPhos  241<H>  08-05    PT/INR - ( 05 Aug 2020 10:35 )   PT: 16.0 sec;   INR: 1.39 ratio                 REVIEW OF SYSTEMS:  General: no fever no chills, no weight loss.  EYES/ENT: No visual changes;  No vertigo, no headache.  NECK: No pain or stiffness  RESPIRATORY: No cough, wheezing, hemoptysis; No shortness of breath  CARDIOVASCULAR: No chest pain or palpitations. No Edema  GASTROINTESTINAL: No abdominal or epigastric pain. No nausea, vomiting. No diarrhea or constipation. No melena. Appetite improved  GENITOURINARY: No dysuria, frequency, foamy urine, urinary urgency, incontinence or hematuria  NEUROLOGICAL: No numbness or weakness, no tremor , no dizziness.           VITALS:  T(F): 98.8 (08-05-20 @ 11:02), Max: 98.8 (08-05-20 @ 11:02)  HR: 71 (08-05-20 @ 11:02)  BP: 127/75 (08-05-20 @ 11:02)  RR: 18 (08-05-20 @ 11:02)  SpO2: 100% (08-05-20 @ 11:02)  Wt(kg): --    08-04 @ 07:01  -  08-05 @ 07:00  --------------------------------------------------------  IN: 437.5 mL / OUT: 0 mL / NET: 437.5 mL        PHYSICAL EXAM:  Constitutional: well developed, no diaphoresis, no distress.  Neck: No JVD, no carotid bruit, supple, no adenopathy  Respiratory: Good air entrance B/L, no wheezes, rales or rhonchi  Cardiovascular: S1, S2, RRR, no pericardial rub, no murmur  Abdomen: BS+, soft, no tenderness, no bruit  Pelvis: bladder nondistended  Extremities: No cyanosis or clubbing. No peripheral edema.   Neurological: A/O x 3, no focal deficits

## 2020-08-05 NOTE — PROGRESS NOTE ADULT - PROBLEM SELECTOR PLAN 4
- Will continue metoprolol 12.5 BID   - Monitor BP post dialysis (usually hypotensive)  - TTE no vegetations

## 2020-08-05 NOTE — PROGRESS NOTE ADULT - ASSESSMENT
ESRD  Post AVF ligation , await IR for PC placement today and dialysis after that  Vancomycin T still elevated, follow T  Specimen Source: .Blood Blood-Peripheral (08.02.20 @ 11:14)  3 hours dialysis , UF 1.5 as tolerated.

## 2020-08-05 NOTE — PROGRESS NOTE ADULT - SUBJECTIVE AND OBJECTIVE BOX
Interventional Radiology Pre-Procedure Note    Procedure: Tunneled HD catheter placement    Diagnosis/Indication: Patient is a 69y old Female with ESRD on HD, s/p removal of infected LUE AVG and nontunneled HD catheter placement. Tunneled HD catheter placement was requested.    PAST MEDICAL & SURGICAL HISTORY:  DM (diabetes mellitus)  Vitamin D deficiency  ESRD (end stage renal disease)  Hyperphosphatemia  Hypertension  S/P bunionectomy: bilateral great toes  H/O hemorrhoidectomy  H/O oral cancer  AVF (arteriovenous fistula): x3    Allergies: sulfADIAZINE (Angioedema)    LABS:  CBC Full  -  ( 05 Aug 2020 10:35 )  WBC Count : 10.13 K/uL  RBC Count : 3.54 M/uL  Hemoglobin : 10.6 g/dL  Hematocrit : 32.7 %  Platelet Count - Automated : 406 K/uL  Mean Cell Volume : 92.4 fl  Mean Cell Hemoglobin : 29.9 pg  Mean Cell Hemoglobin Concentration : 32.4 gm/dL  Auto Neutrophil # : 7.88 K/uL  Auto Lymphocyte # : 1.62 K/uL  Auto Monocyte # : 0.42 K/uL  Auto Eosinophil # : 0.07 K/uL  Auto Basophil # : 0.07 K/uL  Auto Neutrophil % : 77.8 %  Auto Lymphocyte % : 16.0 %  Auto Monocyte % : 4.1 %  Auto Eosinophil % : 0.7 %  Auto Basophil % : 0.7 %    08-05    137  |  101  |  14  ----------------------------<  79  4.2   |  28  |  4.60<H>    Ca    9.5      05 Aug 2020 10:35  Phos  3.9     08-05  Mg     2.0     08-05    TPro  6.7  /  Alb  1.8<L>  /  TBili  0.5  /  DBili  x   /  AST  23  /  ALT  17  /  AlkPhos  241<H>  08-05    PT/INR - ( 05 Aug 2020 10:35 )   PT: 16.0 sec;   INR: 1.39 ratio      Procedure/ risks/ benefits/ alternatives were discussed with the patient, who verbalizes understanding, and witnessed informed consent was obtained.

## 2020-08-05 NOTE — CHART NOTE - NSCHARTNOTEFT_GEN_A_CORE
R fem shiley removed at bedside. Hemostasis achieved. Pt tolerated procedure. Bedrest for 2 hrs. Serial groin checks. Nurse made aware.

## 2020-08-05 NOTE — PROGRESS NOTE ADULT - ASSESSMENT
s/p removal LUE avg  right groin shiley non functional    await IR eval for Permacath placement, possible shiley if IR unable for PC today  local wound care LUE  continue current meds  observation

## 2020-08-05 NOTE — PROGRESS NOTE ADULT - SUBJECTIVE AND OBJECTIVE BOX
PGY-1 Progress Note discussed with attending    PAGER #: [-----] TILL 5:00 PM  PLEASE CONTACT ON CALL TEAM:  - On Call Team (Please refer to Lamar) FROM 5:00 PM - 8:30PM  - Nightfloat Team FROM 8:30 -7:30 AM    CHIEF COMPLAINT & BRIEF HOSPITAL COURSE:  70 yo F with PMHx of ESRD on HD, HTN, DM presented to the ED with L Arm pain, found to have pseudoaneurysm and AVG infection coag neg staph. Patient was taken to OR 7/30/20 for AVG excision and ligation. Admitted her for septic shock to ICU which is now resolved. Patient was transferred to the floor. We will be following up on his augusto/ permacath placement and the hemodialysis.      INTERVAL HPI/OVERNIGHT EVENTS: Patient is stable now.     REVIEW OF SYSTEMS:  CONSTITUTIONAL: No fever, weight loss, or fatigue  RESPIRATORY: No cough, wheezing, chills or hemoptysis; No shortness of breath  CARDIOVASCULAR: No chest pain, palpitations, dizziness, or leg swelling  GASTROINTESTINAL: No abdominal pain. No nausea, vomiting, or hematemesis; No diarrhea or constipation. No melena or hematochezia.  GENITOURINARY: No dysuria or hematuria, urinary frequency  NEUROLOGICAL: No headaches, memory loss, loss of strength, numbness, or tremors  SKIN: No itching, burning, rashes, or lesions     Vital Signs Last 24 Hrs  T(C): 37.1 (05 Aug 2020 11:02), Max: 37.1 (05 Aug 2020 11:02)  T(F): 98.8 (05 Aug 2020 11:02), Max: 98.8 (05 Aug 2020 11:02)  HR: 71 (05 Aug 2020 11:02) (71 - 87)  BP: 127/75 (05 Aug 2020 11:02) (127/75 - 177/97)  BP(mean): 108 (04 Aug 2020 20:00) (103 - 116)  RR: 18 (05 Aug 2020 11:02) (13 - 21)  SpO2: 100% (05 Aug 2020 11:02) (100% - 100%)    PHYSICAL EXAMINATION:  GENERAL: NAD, well built  HEAD:  Atraumatic, Normocephalic  EYES:  conjunctiva and sclera clear  NECK: Supple, No JVD, Normal thyroid  CHEST/LUNG: Clear to auscultation. Clear to percussion bilaterally; No rales, rhonchi, wheezing, or rubs  HEART: Regular rate and rhythm; No murmurs, rubs, or gallops  ABDOMEN: Soft, Nontender, Nondistended; Bowel sounds present  NERVOUS SYSTEM:  Alert & Oriented X3,    EXTREMITIES:  2+ Peripheral Pulses, No clubbing, cyanosis, or edema  SKIN: warm dry                          10.6   10.13 )-----------( 406      ( 05 Aug 2020 10:35 )             32.7     08-05    137  |  101  |  14  ----------------------------<  79  4.2   |  28  |  4.60<H>    Ca    9.5      05 Aug 2020 10:35  Phos  3.9     08-05  Mg     2.0     08-05    TPro  6.7  /  Alb  1.8<L>  /  TBili  0.5  /  DBili  x   /  AST  23  /  ALT  17  /  AlkPhos  241<H>  08-05    LIVER FUNCTIONS - ( 05 Aug 2020 10:35 )  Alb: 1.8 g/dL / Pro: 6.7 g/dL / ALK PHOS: 241 U/L / ALT: 17 U/L DA / AST: 23 U/L / GGT: x               PT/INR - ( 05 Aug 2020 10:35 )   PT: 16.0 sec;   INR: 1.39 ratio           CAPILLARY BLOOD GLUCOSE      RADIOLOGY & ADDITIONAL TESTS:

## 2020-08-05 NOTE — PROGRESS NOTE ADULT - SUBJECTIVE AND OBJECTIVE BOX
CHIEF COMPLAINT:Patient is a 69y old  Female who presents with a chief complaint of Painful swelling at AV fistula. Pt appears comfortable.    	  REVIEW OF SYSTEMS:  CONSTITUTIONAL: No fever, weight loss, or fatigue  EYES: No eye pain, visual disturbances, or discharge  ENT:  No difficulty hearing, tinnitus, vertigo; No sinus or throat pain  NECK: No pain or stiffness  RESPIRATORY: No cough, wheezing, chills or hemoptysis; No Shortness of Breath  CARDIOVASCULAR: No chest pain, palpitations, passing out, dizziness, or leg swelling  GASTROINTESTINAL: No abdominal or epigastric pain. No nausea, vomiting, or hematemesis; No diarrhea or constipation. No melena or hematochezia.  GENITOURINARY: No dysuria, frequency, hematuria, or incontinence  NEUROLOGICAL: No headaches, memory loss, loss of strength, numbness, or tremors  SKIN: No itching, burning, rashes, or lesions   LYMPH Nodes: No enlarged glands  ENDOCRINE: No heat or cold intolerance; No hair loss  MUSCULOSKELETAL: No joint pain or swelling; No muscle, back, or extremity pain  PSYCHIATRIC: No depression, anxiety, mood swings, or difficulty sleeping  HEME/LYMPH: No easy bruising, or bleeding gums  ALLERGY AND IMMUNOLOGIC: No hives or eczema	      PHYSICAL EXAM:  T(C): 36.8 (08-05-20 @ 04:48), Max: 36.9 (08-04-20 @ 21:34)  HR: 79 (08-05-20 @ 04:48) (74 - 110)  BP: 136/92 (08-05-20 @ 04:48) (122/79 - 177/97)  RR: 17 (08-05-20 @ 04:48) (12 - 24)  SpO2: 100% (08-05-20 @ 04:48) (95% - 100%)    I&O's Summary    04 Aug 2020 07:01  -  05 Aug 2020 07:00  --------------------------------------------------------  IN: 437.5 mL / OUT: 0 mL / NET: 437.5 mL        Appearance: Normal	  HEENT:   Normal oral mucosa, PERRL, EOMI	  Lymphatic: No lymphadenopathy  Cardiovascular: Normal S1 S2, No JVD, No murmurs, No edema  Respiratory: Lungs clear to auscultation	  Psychiatry: A & O x 3, Mood & affect appropriate  Gastrointestinal:  Soft, Non-tender, + BS	  Skin: No rashes, No ecchymoses, No cyanosis	  Neurologic: Non-focal  Extremities: Normal range of motion, No clubbing, cyanosis or edema  Vascular: Peripheral pulses palpable 2+ bilaterally    MEDICATIONS  (STANDING):  chlorhexidine 2% Cloths 1 Application(s) Topical <User Schedule>  dextrose 5% + sodium chloride 0.9%. 1000 milliLiter(s) (50 mL/Hr) IV Continuous <Continuous>  epoetin joey-epbx (RETACRIT) Injectable 4000 Unit(s) IV Push <User Schedule>  metoprolol tartrate 12.5 milliGRAM(s) Oral two times a day      	  	  LABS:	 	                       8.9    9.36  )-----------( 313      ( 04 Aug 2020 06:15 )             26.4     08-04    137  |  101  |  10  ----------------------------<  72  3.4<L>   |  29  |  3.43<H>    Ca    8.5      04 Aug 2020 06:15  Phos  2.1     08-04  Mg     1.8     08-04    TPro  5.1<L>  /  Alb  1.4<L>  /  TBili  0.5  /  DBili  x   /  AST  19  /  ALT  12  /  AlkPhos  190<H>  08-04    Lipid Profile: Cholesterol <50  LDL <24  HDL 11  TG 77      TSH: Thyroid Stimulating Hormone, Serum: 3.09 uU/mL (07-30 @ 06:31)      Culture - Blood (08.02.20 @ 11:14)    Specimen Source: .Blood Blood-Peripheral    Culture Results:   No growth to date.

## 2020-08-05 NOTE — PROGRESS NOTE ADULT - ASSESSMENT
68 yo F with PMHx of ESRD on HD, HTN, DM presented to the ED with L Arm pain, found to have pseudoaneurysm and AVG infection coag neg staph. Patient was taken to OR 7/30/20 for AVG excision and ligation. Pt was brought to ICU for septic shock and post op monitoring. Now she is stable. Today we will be following up for his augusto/ permacath placement and her hemodialysis.

## 2020-08-05 NOTE — PROGRESS NOTE ADULT - ASSESSMENT
69 yr old F from [facility], with PMH of HTN, ESRD, DM, Hyperphosphatemia,  Oral cancer(s/p excision) , comes to the ED  with left arm AV fistula pain and swelling x1 week,sepsis due to staph epi, s/p ligation of AVF, acute anemia.  1.ICU monitoring.  2.Sepsis-ABX as per ID.  3.Acute anemia-s/p  PRBC.  4.ESRD-HD as per renal.  5.DM-Insulin.  6.Permacath today if cleared by ID.  7.D/W Pt risk and benefit of ELIZABETH, she declines and will treat 4-6 of ABX.  8.GI and DVT prophylaxis.

## 2020-08-05 NOTE — PROGRESS NOTE ADULT - ASSESSMENT
Patient is a 69y old  Female from [facility], with PMH of HTN, DM, Hyperphosphatemia,  Oral cancer(s/p excision), ESRD on HD via AVF  which created 3-4 years ago s/p exploration of AVF, interposition graft 6/2017, send in  to the ER for evaluation of  left arm AV fistula pain and swelling x1 week. Last week when she went for her HD session and developed gradual onset of swelling and pain at the AVF site on L arm. As per the patient the HD nurse had inserted the needle incorrectly that caused the swelling and severe pain. The AVF was not occluded and she had her HD sessions as per the schedule Tu/Th/Sat with her last HD session being yesterday.  On admission, she has no fever or Leukocytosis, but admission blood cultures growing GPC in clusters. She has started on Zosyn and Vancomycin and the ID consult requested to assist with further evaluation and antibiotic management.      # Bacteremia- 7/29/20- Coagulase Negative Staph. - ? source AVG - Repeat Blood Cx from 7/31 still positive- TTE as of 7/31 is negative for vegetation - Repeat BCx as of 8/2  NGTD  # Infected AVG with surrounding cellulitis - s/p ligation of Left  AVG pseudoaneurysm 7/30  # Septic shock- Post-op 7/30- transferred to ICU since requiring pressor    would recommend:    1. Obtain Vancomycin level now and re-dose if less than 30  2. Dose Vancomycin based on daily level   3. Wound care as per Vascular team  4. Need IV Vancomycin 4 to 6 weeks   5. Continue bowel regimen to prevent stercoral colitis       Attending Attestation:    Spent more than 45 minutes on total encounter, more than 50 % of the visit was spent counseling and/or coordinating care by the Attending physician. Patient is a 69y old  Female from [facility], with PMH of HTN, DM, Hyperphosphatemia,  Oral cancer(s/p excision), ESRD on HD via AVF  which created 3-4 years ago s/p exploration of AVF, interposition graft 6/2017, send in  to the ER for evaluation of  left arm AV fistula pain and swelling x1 week. Last week when she went for her HD session and developed gradual onset of swelling and pain at the AVF site on L arm. As per the patient the HD nurse had inserted the needle incorrectly that caused the swelling and severe pain. The AVF was not occluded and she had her HD sessions as per the schedule Tu/Th/Sat with her last HD session being yesterday.  On admission, she has no fever or Leukocytosis, but admission blood cultures growing GPC in clusters. She has started on Zosyn and Vancomycin and the ID consult requested to assist with further evaluation and antibiotic management.      # Bacteremia- 7/29/20- Coagulase Negative Staph. - ? source AVG - Repeat Blood Cx from 7/31 still positive- TTE as of 7/31 is negative for vegetation - Repeat BCx as of 8/2  NGTD  # Infected AVG with surrounding cellulitis - s/p ligation of Left  AVG pseudoaneurysm 7/30  # Septic shock- Post-op 7/30- transferred to ICU since requiring pressor    would recommend:    1. Dose of Vancomycin since level is less  than 30  2. Wound care as per Vascular team  3. Need IV Vancomycin 4 to 6 weeks , during dialysis   4. Continue bowel regimen to prevent stercoral colitis       Attending Attestation:    Spent more than 45 minutes on total encounter, more than 50 % of the visit was spent counseling and/or coordinating care by the Attending physician.

## 2020-08-05 NOTE — PROGRESS NOTE ADULT - PROBLEM SELECTOR PLAN 1
Patient is hemodynamically stable now.  - AAOx3 at baseline  Was transferred from ICU to the floor now

## 2020-08-05 NOTE — PROGRESS NOTE ADULT - PROBLEM SELECTOR PLAN 3
- Has right femoral Shiley, was not changed by vascular surgery   - Monitor electrolytes    - F/u Dr. Mark Romero/ Permcath placement by IR today  - HD after cath placement  - Vanco trough is high, so will not give a Vanco dose.

## 2020-08-05 NOTE — PROGRESS NOTE ADULT - SUBJECTIVE AND OBJECTIVE BOX
Patient is seen and examined at the bed side, is afebrile.  She has transferred out of ICU, doing better. The WBC count stay normal. She is refusing ELIZABETH.        REVIEW OF SYSTEMS: All other review systems are negative        ALLERGIES: sulfADIAZINE (Angioedema)        Vital Signs Last 24 Hrs  T(C): 36.7 (05 Aug 2020 15:45), Max: 37.1 (05 Aug 2020 11:02)  T(F): 98 (05 Aug 2020 15:45), Max: 98.8 (05 Aug 2020 11:02)  HR: 64 (05 Aug 2020 15:45) (64 - 83)  BP: 173/95 (05 Aug 2020 15:45) (127/75 - 173/95)  BP(mean): 108 (04 Aug 2020 20:00) (103 - 108)  RR: 18 (05 Aug 2020 15:45) (15 - 18)  SpO2: 100% (05 Aug 2020 15:45) (100% - 100%)        PHYSICAL EXAM:  GENERAL: Not in distress   CHEST/LUNG:  Not using accessory muscles  HEART: s1 and s2 present  ABDOMEN:  Nontender and  Nondistended  EXTREMITIES: Left UE ACE bandage in placed, the swelling and tenderness is improving   CNS: Awake and Alert          LABS:                                   10.6   10.13 )-----------( 406      ( 05 Aug 2020 10:35 )             32.7                8.9    9.36  )-----------( 313      ( 04 Aug 2020 06:15 )             26.4                  8.5    14.60 )-----------( 291      ( 01 Aug 2020 06:04 )             25.8       08-05    137  |  101  |  14  ----------------------------<  79  4.2   |  28  |  4.60<H>    Ca    9.5      05 Aug 2020 10:35  Phos  3.9     08-05  Mg     2.0     08-05    TPro  6.7  /  Alb  1.8<L>  /  TBili  0.5  /  DBili  x   /  AST  23  /  ALT  17  /  AlkPhos  241<H>  08-05      08-03    133<L>  |  96  |  12  ----------------------------<  84  3.5   |  27  |  3.65<H>    Ca    8.5      03 Aug 2020 06:37  Phos  2.3     08-  Mg     1.8     08-    TPro  5.3<L>  /  Alb  1.5<L>  /  TBili  0.8  /  DBili  x   /  AST  18  /  ALT  12  /  AlkPhos  211<H>  08-03    PT/INR - ( 2020 16:23 )   PT: 15.1 sec;   INR: 1.31 ratio      PTT - ( 2020 16:23 )  PTT:35.4 sec        CAPILLARY BLOOD GLUCOSE  POCT Blood Glucose.: 114 mg/dL (2020 18:11)  POCT Blood Glucose.: 111 mg/dL (2020 12:15)  POCT Blood Glucose.: 102 mg/dL (2020 10:21)  POCT Blood Glucose.: 77 mg/dL (2020 09:09)  POCT Blood Glucose.: 56 mg/dL (2020 09:07)        Urinalysis Basic - ( 2020 17:02 )  Color: Red / Appearance: bloody / S.015 / pH: x  Gluc: x / Ketone: Negative  / Bili: Small / Urobili: Negative   Blood: x / Protein: 100 / Nitrite: Negative   Leuk Esterase: Moderate / RBC: >50 /HPF / WBC 11-25 /HPF   Sq Epi: x / Non Sq Epi: Few /HPF / Bacteria: Moderate /HPF        Vancomycin Level, Trough: 36.3: Vancomycin trough levels should be rapidly reached and maintained at  15-20 ug/ml for life threatening MRSA  infections such as sepsis, endocarditis, osteomyelitis and pneumonia.       Vancomycin Level, Trough (20 @ 15:53)    Vancomycin Level, Trough: 18.2: Vancomycin trough levels should be rapidly reached and maintained at  15-20 ug/ml for life threatening MRSA  infections such as sepsis, endocarditis, osteomyelitis and pneumonia.     Vancomycin Level, Trough (.20 @ 08:16)    Vancomycin Level, Trough: 29.2: Vancomycin trough levels should be rapidly reached and maintained at  15-20 ug/ml for life threatening MRSA  infections such as sepsis, endocarditis, osteomyelitis and pneumonia.         MEDICATIONS  (STANDING):    chlorhexidine 2% Cloths 1 Application(s) Topical <User Schedule>  epoetin joey-epbx (RETACRIT) Injectable 4000 Unit(s) IV Push <User Schedule>  metoprolol tartrate 12.5 milliGRAM(s) Oral two times a day  polyethylene glycol 3350 17 Gram(s) Oral daily  potassium phosphate IVPB 15 milliMole(s) IV Intermittent once  senna 2 Tablet(s) Oral at bedtime      RADIOLOGY & ADDITIONAL TESTS:    20 : CT Abdomen and Pelvis No Cont (20 @ 14:11) No retroperitoneal hematoma.  Large amount of stool within the rectum.    20 : CT Neck Soft Tissue w/ IV Cont (20 @ 14:10) No cervical fluid collection. If malignancy is a strong clinical concern, PET CT exam recommended for further evaluation.    Multiple pulmonary nodules             < from: Transthoracic Echocardiogram (20 @ 08:49) >  1. Normal mitral valve.  2. Normal trileaflet aortic valve. Mild aortic  regurgitation.  3. Aortic Root: 2.7 cm.  4. Normal left atrium.  LA volume index = 26 cc/m2.  5. Mild concentric left ventricular hypertrophy.  6. Hyperdynamic left ventricular systolic function (EF  >70%).  7. Grade I diastolic dysfunction (Impaired relaxation).  8. Normal right atrium.  9. Normal right ventricular size and systolic function  (TAPSE  2.1cm).  10. Unable to estimate RVSP.  11. Normal tricuspid valve.  12. Normal pulmonic valve.  13. Normal pericardium with no pericardial effusion.    < end of copied text >    20: US Duplex Hemodialysis Access (20 @ 20:44) >  impression: The left upper extremity AVG (likely brachial artery-graft-cephalic vein) is patent. There is narrowing of the graft lumen due to the presence of thrombus, about 40% narrowing. 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      20: Xray Chest 1 View-PORTABLE IMMEDIATE (20 @ 17:54) The cardiac silhouette is within normal limits. The lungs are clear. No pleural abnormality.          MICROBIOLOGY DATA:      Culture - Blood (20 @ 11:14)    Specimen Source: .Blood Blood-Peripheral    Culture Results:   No growth to date.        Culture - Blood (20 @ 08:06)    Gram Stain:   Growth in aerobic bottle: Gram Positive Cocci in Clusters  Growth in anaerobic bottle: Gram Positive Cocci in Clusters    Specimen Source: .Blood Blood-Peripheral    Culture Results:   Growth in aerobic bottle: Staphylococcus epidermidis "Susceptibilities  not performed"  Growth in anaerobic bottle: Gram Positive Cocci in Clusters        Culture - Blood (20 @ 08:06)    Gram Stain:   Growth in aerobic bottle: Gram Positive Cocci in Clusters    Specimen Source: .Blood Blood-Peripheral    Culture Results:   Growth in aerobic bottle: Gram Positive Cocci in Clusters        Culture - Blood (20 @ 08:06)    Specimen Source: .Blood Blood-Peripheral    Culture Results:   No growth to date.      Culture - Surgical Swab (20 @ 06:41)    Specimen Source: .Surgical Swab left arm av graft C&S    Culture Results:   No growth      COVID-19  Antibody - for prior infection screening (20 @ 10:08)    COVID-19 IgG Antibody Index: 7.70: Abbott CMIA  Negative Result    <= 1.39 Index  Positive Result      >= 1.40 Index Index    COVID-19 IgG Antibody Interpretation: Positive: This test has not been reviewed by the FDA by the standard review  process. It has been authorized for emergency use by the FDA. "[employer] has validated this test to be accurate.  Negative results do not rule out SARS-CoV-2 infection, particularly in  those who have been in recent contact with the virus. Follow-up testing  with a molecular diagnostic test should be considered to rule out  infection in these individuals.  Results from antibody testing should not be used as thesole basis to  diagnose or exclude SARS-CoV-2 infection, or to inform infection status.  Positive results may rarely be due to past or present infection with  non-SARS-CoV-2 coronavirus strains, such as coronavirus HKU1, NL63, OC43,  or 229E. "[employer] through extensive validation  testing, has confirmed that this risk is minimal with this test.      Culture - Urine (20 @ 01:34)    Specimen Source: .Urine Clean Catch (Midstream)    Culture Results:   >=3 organisms. Probable collection contamination.      Culture - Blood (20 @ 23:02)    Gram Stain:   Growth in anaerobic bottle: Gram Positive Cocci in Clusters  Growth in aerobic bottle: Gram Positive Cocci in Clusters    -  Ampicillin/Sulbactam: R <=8/4    -  Cefazolin: R <=4    -  Clindamycin: R >4    -  Erythromycin: R >4    -  Gentamicin: R >8 Should not be used as monotherapy    -  Oxacillin: R >2    -  Penicillin: R 8    -  RIF- Rifampin: S <=1 Should not be used as monotherapy    -  Tetra/Doxy: S <=1    -  Trimethoprim/Sulfamethoxazole: R >2/38    -  Vancomycin: S 2    -  Coagulase negative Staphylococcus: Detec    Specimen Source: .Blood Blood-Peripheral    Organism: Blood Culture PCR    Organism: Staphylococcus epidermidis    Culture Results:   Growth in aerobic and anaerobic bottles: Staphylococcus epidermidis  "Due to technical problems, Proteus sp. will Not be reported as part of  the BCID panel until further notice"  ***Blood Panel PCR results on this specimen are available  approximately 3 hours after the Gram stain result.***  Gram stain, PCR, and/or culture results may not always  correspond due to difference in methodologies.  ************************************************************  This PCR assay was performed using [employer].  The following targets are tested for: Enterococcus,  vancomycin resistant enterococci, Listeria monocytogenes,  coagulase negative staphylococci, S. aureus,  methicillin resistant S. aureus, Streptococcus agalactiae  (Group B), S. pneumoniae, S. pyogenes (Group A),  Acinetobacter baumannii, Enterobacter cloacae, E. coli,  Klebsiella oxytoca, K. pneumoniae, Proteus sp.,  Serratia marcescens, Haemophilus influenzae,  Neisseria meningitidis, Pseudomonas aeruginosa, Candida  albicans, C. glabrata, C krusei, C parapsilosis,  C. tropicalis and the KPC resistance gene.    Organism Identification: Blood Culture PCR  Staphylococcus epidermidis    Method Type: PCR    Method Type: JOSÉ MIGUEL      Culture - Blood (20 @ 23:02)    Gram Stain:   Growth in anaerobic bottle: Gram Positive Cocci in Clusters  Growth in aerobic bottle: Gram Positive Cocci in Clusters    -  Coagulase negative Staphylococcus: Detec    Specimen Source: .Blood Blood-Peripheral    Organism: Blood Culture PCR    Culture Results:   Growth in aerobic and anaerobic bottles: Staphylococcus epidermidis  "Due to technical problems, Proteus sp. will Not be reported as part of  the BCID panel until further notice"  ***Blood Panel PCR results on this specimen are available  approximately 3 hours after the Gram stain result.***  Gram stain, PCR, and/or culture results may not always  correspond due to difference in methodologies.  ************************************************************  This PCR assay was performed using [employer].  The following targets are tested for: Enterococcus,  vancomycin resistant enterococci, Listeria monocytogenes,  coagulase negative staphylococci, S. aureus,  methicillin resistant S. aureus, Streptococcus agalactiae  (Group B), S. pneumoniae, S. pyogenes (Group A),  Acinetobacter baumannii, Enterobacter cloacae, E. coli,  Klebsiella oxytoca, K. pneumoniae, Proteus sp.,  Serratia marcescens, Haemophilus influenzae,  Neisseria meningitidis, Pseudomonas aeruginosa, Candida  albicans, C. glabrata, C krusei, C parapsilosis,  C. tropicalis and the KPC resistance gene.    Organism Identification: Blood Culture PCR    Method Type: PCR

## 2020-08-05 NOTE — PROGRESS NOTE ADULT - PROBLEM SELECTOR PLAN 7
- Holding chemical DVT ppx for now given concern of bleeding, on SCD prophylaxis *   - GI : Protonix

## 2020-08-05 NOTE — PROGRESS NOTE ADULT - PROBLEM SELECTOR PLAN 2
Bacteremia (coag neg staph)   - s/p removal of AV graft on 7/30   - Last dose of Vanco was on 8/1/20, holding Vanco for now    - F/u pre dialysis vancomycin level, give vancomycin 1gm intra dialysis if vanc level <20   - Echo G1DD, EF 70%   - Third set of blood cultures prelim negative   - ID Dr. Boston  - Vanco trough today was high (28.9). So will not give a Vanco dose.

## 2020-08-05 NOTE — PROGRESS NOTE ADULT - SUBJECTIVE AND OBJECTIVE BOX
Interval Events:      tolerating po diet  on dysphagia diet  no overnight hypoglycemia  poc glucose tightly controlled  off sliding scale    Allergies    sulfADIAZINE (Angioedema)    Intolerances      Endocrine/Metabolic Medications:      Vital Signs Last 24 Hrs  T(C): 37.1 (05 Aug 2020 11:02), Max: 37.1 (05 Aug 2020 11:02)  T(F): 98.8 (05 Aug 2020 11:02), Max: 98.8 (05 Aug 2020 11:02)  HR: 71 (05 Aug 2020 11:02) (71 - 110)  BP: 127/75 (05 Aug 2020 11:02) (127/75 - 177/97)  BP(mean): 108 (04 Aug 2020 20:00) (103 - 116)  RR: 18 (05 Aug 2020 11:02) (12 - 24)  SpO2: 100% (05 Aug 2020 11:02) (95% - 100%)      PHYSICAL EXAM  Constitutional:    NC/AT:    HEENT:    Neck:  No JVD  Respiratory:  reduced breath sounds b/l bases    Cardiovascular:  RR   Gastrointestinal: Soft     Extremities: without cyanosis      Neurological:  non focal    LABS                        10.6   10.13 )-----------( 406      ( 05 Aug 2020 10:35 )             32.7                               137    |  101    |  14                  Calcium: 9.5   / iCa: x      (08-05 @ 10:35)    ----------------------------<  79        Magnesium: 2.0                              4.2     |  28     |  4.60             Phosphorous: 3.9      TPro  6.7    /  Alb  1.8    /  TBili  0.5    /  DBili  x      /  AST  23     /  ALT  17     /  AlkPhos  241    05 Aug 2020 10:35    CAPILLARY BLOOD GLUCOSE      POCT Blood Glucose.: 131 mg/dL (05 Aug 2020 11:47)  POCT Blood Glucose.: 83 mg/dL (05 Aug 2020 08:07)  POCT Blood Glucose.: 77 mg/dL (04 Aug 2020 22:05)  POCT Blood Glucose.: 86 mg/dL (04 Aug 2020 17:08)        Assesment/plan          70yo female with multiple comorbidities including h/o HTN, DM type 2, ESRD on HD admitted with AV fistula swelling and pain    Endocrinology consulted for glycemic management    DM type 2  complicated by ESRD on HD, sepsis- bacteremia, requiring pressor current admit  NH regimen:  humalog sliding scale    recommendations:  fasting , prandial tightly controlled off sliding scale  tolerating dysphagia diet    - continue off sliding scale  - reassess based on requirements  - goal inpatient glucose range: 140-180mg/dl    sepsis  bacteremia- staph epidermidis  on vancomycin  s/p AV repair/ligation  for possible permacath placement  ID on board  on zosyn  required pressor    ESRD on HD  cautious insulin dosing  complicated by hypoglycemia on low dose sliding scale  off insulin    Discussed with primary team  Please call - Endocrine- Dr Mónica Cm as needed

## 2020-08-05 NOTE — PROGRESS NOTE ADULT - SUBJECTIVE AND OBJECTIVE BOX
Patient is a 69y old  Female who presents with a chief complaint of Painful swelling at AV fistula. (04 Aug 2020 16:10)    pt seen in icu [ x ], reg med floor [   ], bed [ x ], chair at bedside [   ], a+o x3 [ x ], lethargic [  ],    nad [ x ]      pt states feeling better        Allergies    sulfADIAZINE (Angioedema)        Vitals    T(F): 98.2 (08-05-20 @ 04:48), Max: 98.4 (08-04-20 @ 21:34)  HR: 79 (08-05-20 @ 04:48) (74 - 110)  BP: 136/92 (08-05-20 @ 04:48) (122/79 - 177/97)  RR: 17 (08-05-20 @ 04:48) (12 - 24)  SpO2: 100% (08-05-20 @ 04:48) (93% - 100%)  Wt(kg): --  CAPILLARY BLOOD GLUCOSE      POCT Blood Glucose.: 77 mg/dL (04 Aug 2020 22:05)      Labs                          8.9    9.36  )-----------( 313      ( 04 Aug 2020 06:15 )             26.4       08-04    137  |  101  |  10  ----------------------------<  72  3.4<L>   |  29  |  3.43<H>    Ca    8.5      04 Aug 2020 06:15  Phos  2.1     08-04  Mg     1.8     08-04    TPro  5.1<L>  /  Alb  1.4<L>  /  TBili  0.5  /  DBili  x   /  AST  19  /  ALT  12  /  AlkPhos  190<H>  08-04            .Blood Blood-Peripheral  08-02 @ 11:14   No growth to date.  --  --      .Blood Blood-Peripheral  07-31 @ 08:06   Growth in aerobic and anaerobic bottles: Staphylococcus epidermidis  "Susceptibilities not performed"  --    Growth in aerobic bottle: Gram Positive Cocci in Clusters  Growth in anaerobic bottle: Gram Positive Cocci in Clusters      .Surgical Swab left arm av graft C&S  07-31 @ 06:41   Normal skin albin isolated  --  --      .Urine Clean Catch (Midstream)  07-30 @ 01:34   >=3 organisms. Probable collection contamination.  --  --      .Blood Blood-Peripheral  07-29 @ 23:02   Growth in aerobic and anaerobic bottles: Staphylococcus epidermidis  "Due to technical problems, Proteus sp. will Not be reported as part of  the BCID panel until further notice"  ***Blood Panel PCR results on this specimen are available  approximately 3 hours after the Gram stain result.***  Gram stain, PCR, and/or culture results may not always  correspond due to difference in methodologies.  ************************************************************  This PCR assay was performed using [employer].  The following targets are tested for: Enterococcus,  vancomycin resistant enterococci, Listeria monocytogenes,  coagulase negative staphylococci, S. aureus,  methicillin resistant S. aureus, Streptococcus agalactiae  (Group B), S. pneumoniae, S. pyogenes (Group A),  Acinetobacter baumannii, Enterobacter cloacae, E. coli,  Klebsiella oxytoca, K. pneumoniae, Proteus sp.,  Serratia marcescens, Haemophilus influenzae,  Neisseria meningitidis, Pseudomonas aeruginosa, Candida  albicans, C. glabrata, C krusei, C parapsilosis,  C. tropicalis and the KPC resistance gene.  --  Blood Culture PCR          Radiology Results      Meds    MEDICATIONS  (STANDING):  chlorhexidine 2% Cloths 1 Application(s) Topical <User Schedule>  dextrose 5% + sodium chloride 0.9%. 1000 milliLiter(s) (50 mL/Hr) IV Continuous <Continuous>  epoetin joey-epbx (RETACRIT) Injectable 4000 Unit(s) IV Push <User Schedule>  metoprolol tartrate 12.5 milliGRAM(s) Oral two times a day      MEDICATIONS  (PRN):  acetaminophen   Tablet .. 650 milliGRAM(s) Oral every 6 hours PRN Mild Pain (1 - 3)  HYDROmorphone  Injectable 0.5 milliGRAM(s) IV Push every 6 hours PRN Moderate Pain (4 - 6)      Physical Exam    Neuro :  no focal deficits  Respiratory: CTA B/L  CV: RRR, S1S2, no murmurs,   Abdominal: Soft, NT, ND +BS,  Extremities: No edema, + peripheral pulses, left ue dressing cdi, distal forearm and hand edema,      ASSESSMENT      Infected prosthetic vascular graft LUE  Pseudoaneurysm of LUE arteriovenous graft  Cellulitis of left upper extremity    uti   a/p acute blood loss anemia  h/o ESRD on hd,   DM (diabetes mellitus)  Vitamin D deficiency  Hyperphosphatemia  Hypertension  S/P bunionectomy  hemorrhoidectomy  oral cancer  left AVF (arteriovenous fistula)      PLAN      s/p Excision, infected graft, extremity and Ligation of arteriovenous fistula or access graft of upper extremity 7/30/20  vasc surg f/u    cont pain control  cont wound care  gi/dvt prophylaxis  cont zosyn,   id f/u   f/u Vancomycin level and re-dose if less than 30   Dose Vancomycin based on daily level   Wound care as per Vascular team  f/u ELIZABETH since TTE is negative and AVG Cx is also negative  blood cx with  Staphylococcus epidermidis noted above  rept blood cx  8/2 neg noted above  Surgical Swab left arm av graft C&S with Normal skin albin isolated noted above   echo with Normal mitral valve. Normal trileaflet aortic valve. Mild aortic regurgitation.  Hyperdynamic left ventricular systolic function (EF >70%). Grade I diastolic dysfunction noted above  cardio f/u   cta neck with No cervical fluid collection. If malignancy is a strong clinical concern, PET CT exam recommended for further evaluation. Multiple pulmonary nodules as described above. CT chest recommended noted above  ct abd pelv with No retroperitoneal hematoma. Large amount of stool within the rectum.   cont senna 2 tabs qhs  cont miralax daily   malfunctioning shiley s/p shiley exchange at bedside 8/3/20  renal f/u   post CTA with contrast s/p dialysis yesterday with zero  net fluid removal , 3 K bath  Acute anemia , bleed from surgery site, and possible thigh hematoma post CATHETR PLACEMENT.  Epogen 4000 TIW  pt off pressors  hgba1c 5.4   endo f/u   fasting , prandial controlled  serum glucose tightly controlled  low dose sliding scale stopped  reassess based on requirements  goal inpatient glucose range: 140-180mg/d   swallow eval noted and rec Puree solids with Nectar Thick liquids.  cont current meds   mgmt as per icu Patient is a 69y old  Female who presents with a chief complaint of Painful swelling at AV fistula. (04 Aug 2020 16:10)    pt seen in icu [ x ], reg med floor [   ], bed [ x ], chair at bedside [   ], a+o x3 [ x ], lethargic [  ],    nad [ x ]      Allergies    sulfADIAZINE (Angioedema)        Vitals    T(F): 98.2 (08-05-20 @ 04:48), Max: 98.4 (08-04-20 @ 21:34)  HR: 79 (08-05-20 @ 04:48) (74 - 110)  BP: 136/92 (08-05-20 @ 04:48) (122/79 - 177/97)  RR: 17 (08-05-20 @ 04:48) (12 - 24)  SpO2: 100% (08-05-20 @ 04:48) (93% - 100%)  Wt(kg): --  CAPILLARY BLOOD GLUCOSE      POCT Blood Glucose.: 77 mg/dL (04 Aug 2020 22:05)      Labs                          8.9    9.36  )-----------( 313      ( 04 Aug 2020 06:15 )             26.4       08-04    137  |  101  |  10  ----------------------------<  72  3.4<L>   |  29  |  3.43<H>    Ca    8.5      04 Aug 2020 06:15  Phos  2.1     08-04  Mg     1.8     08-04    TPro  5.1<L>  /  Alb  1.4<L>  /  TBili  0.5  /  DBili  x   /  AST  19  /  ALT  12  /  AlkPhos  190<H>  08-04            .Blood Blood-Peripheral  08-02 @ 11:14   No growth to date.  --  --      .Blood Blood-Peripheral  07-31 @ 08:06   Growth in aerobic and anaerobic bottles: Staphylococcus epidermidis  "Susceptibilities not performed"  --    Growth in aerobic bottle: Gram Positive Cocci in Clusters  Growth in anaerobic bottle: Gram Positive Cocci in Clusters      .Surgical Swab left arm av graft C&S  07-31 @ 06:41   Normal skin albin isolated  --  --      .Urine Clean Catch (Midstream)  07-30 @ 01:34   >=3 organisms. Probable collection contamination.  --  --      .Blood Blood-Peripheral  07-29 @ 23:02   Growth in aerobic and anaerobic bottles: Staphylococcus epidermidis  "Due to technical problems, Proteus sp. will Not be reported as part of  the BCID panel until further notice"  ***Blood Panel PCR results on this specimen are available  approximately 3 hours after the Gram stain result.***  Gram stain, PCR, and/or culture results may not always  correspond due to difference in methodologies.  ************************************************************  This PCR assay was performed using [employer].  The following targets are tested for: Enterococcus,  vancomycin resistant enterococci, Listeria monocytogenes,  coagulase negative staphylococci, S. aureus,  methicillin resistant S. aureus, Streptococcus agalactiae  (Group B), S. pneumoniae, S. pyogenes (Group A),  Acinetobacter baumannii, Enterobacter cloacae, E. coli,  Klebsiella oxytoca, K. pneumoniae, Proteus sp.,  Serratia marcescens, Haemophilus influenzae,  Neisseria meningitidis, Pseudomonas aeruginosa, Candida  albicans, C. glabrata, C krusei, C parapsilosis,  C. tropicalis and the KPC resistance gene.  --  Blood Culture PCR          Radiology Results      Meds    MEDICATIONS  (STANDING):  chlorhexidine 2% Cloths 1 Application(s) Topical <User Schedule>  dextrose 5% + sodium chloride 0.9%. 1000 milliLiter(s) (50 mL/Hr) IV Continuous <Continuous>  epoetin joey-epbx (RETACRIT) Injectable 4000 Unit(s) IV Push <User Schedule>  metoprolol tartrate 12.5 milliGRAM(s) Oral two times a day      MEDICATIONS  (PRN):  acetaminophen   Tablet .. 650 milliGRAM(s) Oral every 6 hours PRN Mild Pain (1 - 3)  HYDROmorphone  Injectable 0.5 milliGRAM(s) IV Push every 6 hours PRN Moderate Pain (4 - 6)      Physical Exam    Neuro :  no focal deficits  Respiratory: CTA B/L  CV: RRR, S1S2, no murmurs,   Abdominal: Soft, NT, ND +BS,  Extremities: No edema, + peripheral pulses, left ue dressing cdi, distal forearm and hand edema,      ASSESSMENT      Infected prosthetic vascular graft LUE  Pseudoaneurysm of LUE arteriovenous graft  Cellulitis of left upper extremity    uti   a/p acute blood loss anemia  h/o ESRD on hd,   DM (diabetes mellitus)  Vitamin D deficiency  Hyperphosphatemia  Hypertension  S/P bunionectomy  hemorrhoidectomy  oral cancer  left AVF (arteriovenous fistula)      PLAN      s/p Excision, infected graft, extremity and Ligation of arteriovenous fistula or access graft of upper extremity 7/30/20  vasc surg f/u    cont pain control  cont wound care  gi/dvt prophylaxis  cont zosyn,   id f/u   f/u Vancomycin level and re-dose if less than 30  Dose Vancomycin based on daily level   Wound care as per Vascular team  f/u ELIZABETH since TTE is negative and AVG Cx is also negative  blood cx with  Staphylococcus epidermidis noted above  rept blood cx  8/2 neg noted above  Surgical Swab left arm av graft C&S with Normal skin albin isolated noted above   echo with Normal mitral valve. Normal trileaflet aortic valve. Mild aortic regurgitation.  Hyperdynamic left ventricular systolic function (EF >70%). Grade I diastolic dysfunction noted above  cardio f/u   D/W Pt risk and benefit of ELIZABETH, she declines and will treat 4-6 of ABX  cta neck with No cervical fluid collection. If malignancy is a strong clinical concern, PET CT exam recommended for further evaluation. Multiple pulmonary nodules as described above. CT chest recommended noted above  ct abd pelv with No retroperitoneal hematoma. Large amount of stool within the rectum.   cont senna 2 tabs qhs  cont miralax daily   malfunctioning moshe s/p shiley exchange at bedside 8/3/20  moshe again not working and pt to have perma cath placement today  d/w id who states no contraindication to catheter placement since blood cx is neg  renal f/u   post CTA with contrast s/p dialysis yesterday with zero  net fluid removal , 3 K bath  Epogen 4000 TIW  pt off pressors  hgba1c 5.4   endo f/u   fasting , prandial controlled  serum glucose tightly controlled  low dose sliding scale stopped  reassess based on requirements  goal inpatient glucose range: 140-180mg/d   swallow eval noted and rec Puree solids with Nectar Thick liquids.  cont current meds   mgmt as per icu Patient is a 69y old  Female who presents with a chief complaint of Painful swelling at AV fistula. (04 Aug 2020 16:10)    pt seen in tele [ x ], reg med floor [   ], bed [ x ], chair at bedside [   ], a+o x3 [ x ], lethargic [  ],    nad [ x ]      Allergies    sulfADIAZINE (Angioedema)        Vitals    T(F): 98.2 (08-05-20 @ 04:48), Max: 98.4 (08-04-20 @ 21:34)  HR: 79 (08-05-20 @ 04:48) (74 - 110)  BP: 136/92 (08-05-20 @ 04:48) (122/79 - 177/97)  RR: 17 (08-05-20 @ 04:48) (12 - 24)  SpO2: 100% (08-05-20 @ 04:48) (93% - 100%)  Wt(kg): --  CAPILLARY BLOOD GLUCOSE      POCT Blood Glucose.: 77 mg/dL (04 Aug 2020 22:05)      Labs                          8.9    9.36  )-----------( 313      ( 04 Aug 2020 06:15 )             26.4       08-04    137  |  101  |  10  ----------------------------<  72  3.4<L>   |  29  |  3.43<H>    Ca    8.5      04 Aug 2020 06:15  Phos  2.1     08-04  Mg     1.8     08-04    TPro  5.1<L>  /  Alb  1.4<L>  /  TBili  0.5  /  DBili  x   /  AST  19  /  ALT  12  /  AlkPhos  190<H>  08-04            .Blood Blood-Peripheral  08-02 @ 11:14   No growth to date.  --  --      .Blood Blood-Peripheral  07-31 @ 08:06   Growth in aerobic and anaerobic bottles: Staphylococcus epidermidis  "Susceptibilities not performed"  --    Growth in aerobic bottle: Gram Positive Cocci in Clusters  Growth in anaerobic bottle: Gram Positive Cocci in Clusters      .Surgical Swab left arm av graft C&S  07-31 @ 06:41   Normal skin albin isolated  --  --      .Urine Clean Catch (Midstream)  07-30 @ 01:34   >=3 organisms. Probable collection contamination.  --  --      .Blood Blood-Peripheral  07-29 @ 23:02   Growth in aerobic and anaerobic bottles: Staphylococcus epidermidis  "Due to technical problems, Proteus sp. will Not be reported as part of  the BCID panel until further notice"  ***Blood Panel PCR results on this specimen are available  approximately 3 hours after the Gram stain result.***  Gram stain, PCR, and/or culture results may not always  correspond due to difference in methodologies.  ************************************************************  This PCR assay was performed using [employer].  The following targets are tested for: Enterococcus,  vancomycin resistant enterococci, Listeria monocytogenes,  coagulase negative staphylococci, S. aureus,  methicillin resistant S. aureus, Streptococcus agalactiae  (Group B), S. pneumoniae, S. pyogenes (Group A),  Acinetobacter baumannii, Enterobacter cloacae, E. coli,  Klebsiella oxytoca, K. pneumoniae, Proteus sp.,  Serratia marcescens, Haemophilus influenzae,  Neisseria meningitidis, Pseudomonas aeruginosa, Candida  albicans, C. glabrata, C krusei, C parapsilosis,  C. tropicalis and the KPC resistance gene.  --  Blood Culture PCR          Radiology Results      Meds    MEDICATIONS  (STANDING):  chlorhexidine 2% Cloths 1 Application(s) Topical <User Schedule>  dextrose 5% + sodium chloride 0.9%. 1000 milliLiter(s) (50 mL/Hr) IV Continuous <Continuous>  epoetin joey-epbx (RETACRIT) Injectable 4000 Unit(s) IV Push <User Schedule>  metoprolol tartrate 12.5 milliGRAM(s) Oral two times a day      MEDICATIONS  (PRN):  acetaminophen   Tablet .. 650 milliGRAM(s) Oral every 6 hours PRN Mild Pain (1 - 3)  HYDROmorphone  Injectable 0.5 milliGRAM(s) IV Push every 6 hours PRN Moderate Pain (4 - 6)      Physical Exam    Neuro :  no focal deficits  Respiratory: CTA B/L  CV: RRR, S1S2, no murmurs,   Abdominal: Soft, NT, ND +BS,  Extremities: No edema, + peripheral pulses, left ue dressing cdi, distal forearm and hand edema,      ASSESSMENT      Infected prosthetic vascular graft LUE  Pseudoaneurysm of LUE arteriovenous graft  Cellulitis of left upper extremity    uti   a/p acute blood loss anemia  h/o ESRD on hd,   DM (diabetes mellitus)  Vitamin D deficiency  Hyperphosphatemia  Hypertension  S/P bunionectomy  hemorrhoidectomy  oral cancer  left AVF (arteriovenous fistula)      PLAN      s/p Excision, infected graft, extremity and Ligation of arteriovenous fistula or access graft of upper extremity 7/30/20  vasc surg f/u    cont pain control  cont wound care  gi/dvt prophylaxis  cont zosyn,   id f/u   f/u Vancomycin level and re-dose if less than 30  Dose Vancomycin based on daily level   Wound care as per Vascular team  f/u ELIZABETH since TTE is negative and AVG Cx is also negative  blood cx with  Staphylococcus epidermidis noted above  rept blood cx  8/2 neg noted above  Surgical Swab left arm av graft C&S with Normal skin albin isolated noted above   echo with Normal mitral valve. Normal trileaflet aortic valve. Mild aortic regurgitation.  Hyperdynamic left ventricular systolic function (EF >70%). Grade I diastolic dysfunction noted above  cardio f/u   D/W Pt risk and benefit of ELIZABETH, she declines and will treat 4-6 of ABX  cta neck with No cervical fluid collection. If malignancy is a strong clinical concern, PET CT exam recommended for further evaluation. Multiple pulmonary nodules as described above. CT chest recommended noted above  ct abd pelv with No retroperitoneal hematoma. Large amount of stool within the rectum.   cont senna 2 tabs qhs  cont miralax daily   malfunctioning moshe s/p shiley exchange at bedside 8/3/20  moshe again not working and pt to have perma cath placement today  d/w id who states no contraindication to catheter placement since blood cx is neg  renal f/u   post CTA with contrast s/p dialysis yesterday with zero  net fluid removal , 3 K bath  Epogen 4000 TIW  pt off pressors  hgba1c 5.4   endo f/u   fasting , prandial controlled  serum glucose tightly controlled  low dose sliding scale stopped  reassess based on requirements  goal inpatient glucose range: 140-180mg/d   swallow eval noted and rec Puree solids with Nectar Thick liquids.  cont current meds

## 2020-08-05 NOTE — PROGRESS NOTE ADULT - SUBJECTIVE AND OBJECTIVE BOX
Pt s- new complaints  ICU Vital Signs Last 24 Hrs  T(C): 36.8 (05 Aug 2020 04:48), Max: 36.9 (04 Aug 2020 21:34)  T(F): 98.2 (05 Aug 2020 04:48), Max: 98.4 (04 Aug 2020 21:34)  HR: 79 (05 Aug 2020 04:48) (74 - 110)  BP: 136/92 (05 Aug 2020 04:48) (129/90 - 177/97)  BP(mean): 108 (04 Aug 2020 20:00) (100 - 116)  ABP: --  ABP(mean): --  RR: 17 (05 Aug 2020 04:48) (12 - 24)  SpO2: 100% (05 Aug 2020 04:48) (95% - 100%)  Left arm: wound/dressing cdi, no purulence no bleed  groin shiley in place, no hematoma                        8.9    9.36  )-----------( 313      ( 04 Aug 2020 06:15 )             26.4   08-04    137  |  101  |  10  ----------------------------<  72  3.4<L>   |  29  |  3.43<H>    Ca    8.5      04 Aug 2020 06:15  Phos  2.1     08-04  Mg     1.8     08-04    TPro  5.1<L>  /  Alb  1.4<L>  /  TBili  0.5  /  DBili  x   /  AST  19  /  ALT  12  /  AlkPhos  190<H>  08-04

## 2020-08-06 ENCOUNTER — TRANSCRIPTION ENCOUNTER (OUTPATIENT)
Age: 70
End: 2020-08-06

## 2020-08-06 DIAGNOSIS — R53.81 OTHER MALAISE: ICD-10-CM

## 2020-08-06 DIAGNOSIS — Z51.5 ENCOUNTER FOR PALLIATIVE CARE: ICD-10-CM

## 2020-08-06 DIAGNOSIS — R52 PAIN, UNSPECIFIED: ICD-10-CM

## 2020-08-06 DIAGNOSIS — R91.8 OTHER NONSPECIFIC ABNORMAL FINDING OF LUNG FIELD: ICD-10-CM

## 2020-08-06 LAB
ANION GAP SERPL CALC-SCNC: 10 MMOL/L — SIGNIFICANT CHANGE UP (ref 5–17)
BUN SERPL-MCNC: 7 MG/DL — SIGNIFICANT CHANGE UP (ref 7–18)
CALCIUM SERPL-MCNC: 8.3 MG/DL — LOW (ref 8.4–10.5)
CHLORIDE SERPL-SCNC: 103 MMOL/L — SIGNIFICANT CHANGE UP (ref 96–108)
CO2 SERPL-SCNC: 29 MMOL/L — SIGNIFICANT CHANGE UP (ref 22–31)
CREAT SERPL-MCNC: 3.03 MG/DL — HIGH (ref 0.5–1.3)
GLUCOSE BLDC GLUCOMTR-MCNC: 121 MG/DL — HIGH (ref 70–99)
GLUCOSE BLDC GLUCOMTR-MCNC: 129 MG/DL — HIGH (ref 70–99)
GLUCOSE BLDC GLUCOMTR-MCNC: 145 MG/DL — HIGH (ref 70–99)
GLUCOSE BLDC GLUCOMTR-MCNC: 96 MG/DL — SIGNIFICANT CHANGE UP (ref 70–99)
GLUCOSE SERPL-MCNC: 81 MG/DL — SIGNIFICANT CHANGE UP (ref 70–99)
HCT VFR BLD CALC: 29.3 % — LOW (ref 34.5–45)
HGB BLD-MCNC: 9.4 G/DL — LOW (ref 11.5–15.5)
MCHC RBC-ENTMCNC: 30 PG — SIGNIFICANT CHANGE UP (ref 27–34)
MCHC RBC-ENTMCNC: 32.1 GM/DL — SIGNIFICANT CHANGE UP (ref 32–36)
MCV RBC AUTO: 93.6 FL — SIGNIFICANT CHANGE UP (ref 80–100)
MRSA PCR RESULT.: SIGNIFICANT CHANGE UP
NRBC # BLD: 0 /100 WBCS — SIGNIFICANT CHANGE UP (ref 0–0)
PLATELET # BLD AUTO: 344 K/UL — SIGNIFICANT CHANGE UP (ref 150–400)
POTASSIUM SERPL-MCNC: 3.9 MMOL/L — SIGNIFICANT CHANGE UP (ref 3.5–5.3)
POTASSIUM SERPL-SCNC: 3.9 MMOL/L — SIGNIFICANT CHANGE UP (ref 3.5–5.3)
RBC # BLD: 3.13 M/UL — LOW (ref 3.8–5.2)
RBC # FLD: 20.2 % — HIGH (ref 10.3–14.5)
S AUREUS DNA NOSE QL NAA+PROBE: DETECTED
SARS-COV-2 RNA SPEC QL NAA+PROBE: SIGNIFICANT CHANGE UP
SODIUM SERPL-SCNC: 142 MMOL/L — SIGNIFICANT CHANGE UP (ref 135–145)
WBC # BLD: 8.99 K/UL — SIGNIFICANT CHANGE UP (ref 3.8–10.5)
WBC # FLD AUTO: 8.99 K/UL — SIGNIFICANT CHANGE UP (ref 3.8–10.5)

## 2020-08-06 PROCEDURE — 71250 CT THORAX DX C-: CPT | Mod: 26

## 2020-08-06 PROCEDURE — 99223 1ST HOSP IP/OBS HIGH 75: CPT

## 2020-08-06 PROCEDURE — 99497 ADVNCD CARE PLAN 30 MIN: CPT | Mod: 25

## 2020-08-06 RX ORDER — HEPARIN SODIUM 5000 [USP'U]/ML
5000 INJECTION INTRAVENOUS; SUBCUTANEOUS EVERY 12 HOURS
Refills: 0 | Status: DISCONTINUED | OUTPATIENT
Start: 2020-08-06 | End: 2020-08-06

## 2020-08-06 RX ORDER — MUPIROCIN 20 MG/G
1 OINTMENT TOPICAL EVERY 12 HOURS
Refills: 0 | Status: DISCONTINUED | OUTPATIENT
Start: 2020-08-06 | End: 2020-08-07

## 2020-08-06 RX ORDER — ACETAMINOPHEN 500 MG
2 TABLET ORAL
Qty: 0 | Refills: 0 | DISCHARGE
Start: 2020-08-06

## 2020-08-06 RX ADMIN — HYDROMORPHONE HYDROCHLORIDE 0.5 MILLIGRAM(S): 2 INJECTION INTRAMUSCULAR; INTRAVENOUS; SUBCUTANEOUS at 18:40

## 2020-08-06 RX ADMIN — CHLORHEXIDINE GLUCONATE 1 APPLICATION(S): 213 SOLUTION TOPICAL at 14:35

## 2020-08-06 RX ADMIN — Medication 12.5 MILLIGRAM(S): at 05:57

## 2020-08-06 RX ADMIN — Medication 12.5 MILLIGRAM(S): at 17:46

## 2020-08-06 RX ADMIN — CHLORHEXIDINE GLUCONATE 1 APPLICATION(S): 213 SOLUTION TOPICAL at 05:56

## 2020-08-06 RX ADMIN — HYDROMORPHONE HYDROCHLORIDE 0.5 MILLIGRAM(S): 2 INJECTION INTRAMUSCULAR; INTRAVENOUS; SUBCUTANEOUS at 17:51

## 2020-08-06 RX ADMIN — MUPIROCIN 1 APPLICATION(S): 20 OINTMENT TOPICAL at 17:46

## 2020-08-06 NOTE — DISCHARGE NOTE PROVIDER - HOSPITAL COURSE
70 yo F with PMHx of ESRD on HD, HTN, DM presented to the ED with L Arm pain, found to have pseudoaneurysm and AVG infection coag neg staph. Patient was taken to OR 7/30/20 for AVG excision and ligation. Pt was brought to ICU for septic shock and post op monitoring.              Septic shock.  Patient was admitted to ICU for septic shock and bacteremia requiring close monitoring, She was later downgraded from the ICU and managed on floor.             Bacteremia. Patient was found to have bacteremia secondary to coag negative staph. She was started on vancomycin and it was adjusted according to renal function. Repeat cultures were negative. TTE did not show vegetations but patient refued to get ELIZABETH. She will need 4-6 weeks of IV antibiotic during dialysis             ESRD (end stage renal disease).  Patient has ESRD and on dialysis, She was continued on dialysis treatment in house. Incision and ligation of AV graft was done on 6/30, She received     few sessions via femoral Shiley catheter and later vascular surgery placed dialysis catheter.- HD done after cath placement. We did daily Vancomycin trough as it was on higher side, She received vancomycin during dialysis as needed.            Hypertension.  We continued metoprolol 12.5 BID  with parameters and it was controlled.             DM (diabetes mellitus). Patient has Hx of DM on  at home. A1C was 5.4    We monitored her off insulin and she is being discharged without any hypoglycemia agent.         Lung nodules.  Ct neck showed lung nodules. We did CT chest no contrast and it showed...            Anemia Anemia of chronic disease  and ESRD. His HB dropped and there was concern of bleed so he received 2 units of PRBC and prophylaxis was held        Prophylactic measure.  Held  chemical DVT ppx initially for concern of bleeding, she was put on SCD prophylaxis.     - GI : Protonix.             Given patient's improved clinical status and current hemodynamic stability, decision was made to discharge. Discussed with attending    Please refer to patient's complete medical chart with documents for a full hospital course, for this is only a brief summary. 70 yo F with PMHx of ESRD on HD, HTN, DM presented to the ED with L Arm pain, found to have pseudoaneurysm and AVG infection coag neg staph. Patient was taken to OR 7/30/20 for AVG excision and ligation. Pt was brought to ICU for septic shock and post op monitoring.              Septic shock.  Patient was admitted to ICU for septic shock and bacteremia requiring close monitoring, She was later downgraded from the ICU and managed on floor.             Bacteremia. Patient was found to have bacteremia secondary to coag negative staph. She was started on vancomycin and it was adjusted according to renal function. Repeat cultures were negative. TTE did not show vegetations but patient refued to get ELIZABETH. She will need 4-6 weeks of IV antibiotic during dialysis             ESRD (end stage renal disease).  Patient has ESRD and on dialysis, She was continued on dialysis treatment in house. Incision and ligation of AV graft was done on 6/30, She received     few sessions via femoral Shiley catheter and later vascular surgery placed dialysis catheter.- HD done after cath placement. We did daily Vancomycin trough as it was on higher side, She received vancomycin during dialysis as needed.            Hypertension.  We continued metoprolol 12.5 BID  with parameters and it was controlled.             DM (diabetes mellitus). Patient has Hx of DM on  at home. A1C was 5.4    We monitored her off insulin and she is being discharged without any hypoglycemia agent.         Lung nodules.  Ct neck showed lung nodules. We did CT chest no contrast and it showed two nodules. Patient needs repeat CT in 6 months for evaluation            Anemia Anemia of chronic disease  and ESRD. His HB dropped and there was concern of bleed so he received 2 units of PRBC and prophylaxis was held        Prophylactic measure.  Held  chemical DVT ppx initially for concern of bleeding, she was put on SCD prophylaxis.     - GI : Protonix.             Given patient's improved clinical status and current hemodynamic stability, decision was made to discharge. Discussed with attending    Please refer to patient's complete medical chart with documents for a full hospital course, for this is only a brief summary.

## 2020-08-06 NOTE — CONSULT NOTE ADULT - SUBJECTIVE AND OBJECTIVE BOX
HPI:  69F from [facility], with PMH of HTN, ESRD, DM, Hyperphosphatemia,  Oral cancer(s/p excision) , comes to the ED  with left arm AV fistula pain and swelling x1 week. Pt was in her usual state of health until last week when she went for her HD session and developed gradual onset of swelling and pain at the AVF site on L arm. As per the patient the HD nurse had inserted the needle incorrectly that caused the swelling and severe pain. AVF however was not occluded and she had her HD sessions as per the schedule T/Th/Sa with her last HD session being yesterday.  Reports that she has chest pain in the mid chest that is chronic for years now.   Pt denies any smoking, alcohol or any form of substance abuse.   In the ED,   Pt is AAOX3, moderate distress due to pain   Vitals- 113/ 68, HR- 77, afebrile  wbc- 10.37  Hb 7.1  UA- UTI   EKG- NSR (29 Jul 2020 20:41)    Course notable for LUE cellulitis, excision of graft, 7/30/2020, bacteremia, blood loss anemia, lung nodules noted on imaging.  CT chest planned.  D/C planning underway.  For goals of care.    Patient reports that she came into the hospital due to "a capps capps on my left arm," present for two weeks.  Endorses associated malaise, denies associated fever/chills.  Patient is inquiring about discharge; noted to have declined GERARD. When asked about her thoughts around rehab, she states that she will do "whatever is needed."      PAST MEDICAL & SURGICAL HISTORY:  DM (diabetes mellitus)  Vitamin D deficiency  ESRD (end stage renal disease)  Hyperphosphatemia  Hypertension  S/P bunionectomy: bilateral great toes  H/O hemorrhoidectomy  H/O oral cancer  AVF (arteriovenous fistula): x3      SOCIAL HISTORY:    Admitted from:  home  Substance abuse history:      denies          Zoroastrianism:         ; declines chaplaincy support                           Preferred Language: English    Surrogate/HCP/Guardian:  sister: Radha Vargas noted as contact           Phone#: [phone].  Patient and Radha Vargas reside together.    Reports that she is a , and has one daughter, Jina Arroyo, who resides in [place], with her , who is in the army.  Shares that her older sister, Neeta Soliz helps her make medical decisions.  States that neither she nor her sisterRadha have her phone number.    FAMILY HISTORY:  Family history of diabetes mellitus in sister (Sibling): and HTN    Baseline ADLs (prior to admission): uses walker at home.  Uses walker outside her home.    Allergies    sulfADIAZINE (Angioedema)    Intolerances      Present Symptoms:   Dyspnea: denies  Nausea/Vomiting: denies  Anxiety: denies  Fatigue: endorses  Weakness: generalized, endorses  Loss of appetite: states her appetite is ok.  20 pound weight loss reported over months  Pain:      denies                         Review of Systems: All others negative     MEDICATIONS  (STANDING):  chlorhexidine 2% Cloths 1 Application(s) Topical <User Schedule>  chlorhexidine 2% Cloths 1 Application(s) Topical daily  dextrose 5% + sodium chloride 0.9%. 1000 milliLiter(s) (50 mL/Hr) IV Continuous <Continuous>  epoetin joey-epbx (RETACRIT) Injectable 4000 Unit(s) IV Push <User Schedule>  metoprolol tartrate 12.5 milliGRAM(s) Oral two times a day  mupirocin 2% Nasal 1 Application(s) Nasal every 12 hours    MEDICATIONS  (PRN):  acetaminophen   Tablet .. 650 milliGRAM(s) Oral every 6 hours PRN Mild Pain (1 - 3)  HYDROmorphone  Injectable 0.5 milliGRAM(s) IV Push every 6 hours PRN Moderate Pain (4 - 6)      PHYSICAL EXAM:    Vital Signs Last 24 Hrs  T(C): 36.7 (06 Aug 2020 05:17), Max: 36.8 (05 Aug 2020 18:30)  T(F): 98 (06 Aug 2020 05:17), Max: 98.3 (05 Aug 2020 18:30)  HR: 74 (06 Aug 2020 11:05) (64 - 87)  BP: 141/91 (06 Aug 2020 11:05) (119/74 - 173/95)  BP(mean): --  RR: 18 (06 Aug 2020 05:17) (18 - 18)  SpO2: 97% (06 Aug 2020 11:05) (97% - 100%)    General: alert  oriented x 2-3 (uncertain of facility name.  Alert to self, time)     Karnofsky Performance Score/Palliative Performance Status Version2:  40   %    HEENT: NC/AT, sclerae non-icteric  Lungs: comfortable  CV:S1, S2  GI: normal: soft, non-tender, + bowel sounds               PEG/NG/OG tube  constipation  last BM:   : no mayo, on HD  Musculoskeletal: general weakness, requires walker   Skin: warm and dry, LUE in wrap.    Peripheral Vasc: LUE swelling  Neuro: alert and oriented x 2-3 (self, time, uncertain of hospital name).  Moves all extremities  Oral intake ability: fair  Diet:     LABS:                        9.4    8.99  )-----------( 344      ( 06 Aug 2020 07:10 )             29.3     08-06    142  |  103  |  7   ----------------------------<  81  3.9   |  29  |  3.03<H>    Ca    8.3<L>      06 Aug 2020 07:10  Phos  3.9     08-05  Mg     2.0     08-05    TPro  6.7  /  Alb  1.8<L>  /  TBili  0.5  /  DBili  x   /  AST  23  /  ALT  17  /  AlkPhos  241<H>  08-05        RADIOLOGY & ADDITIONAL STUDIES:    ADVANCE DIRECTIVES: full code orders  Advanced Care Planning discussion total time spent:  20 min HPI:  69F from [facility], with PMH of HTN, ESRD, DM, Hyperphosphatemia,  Oral cancer(s/p excision) , comes to the ED  with left arm AV fistula pain and swelling x1 week. Pt was in her usual state of health until last week when she went for her HD session and developed gradual onset of swelling and pain at the AVF site on L arm. As per the patient the HD nurse had inserted the needle incorrectly that caused the swelling and severe pain. AVF however was not occluded and she had her HD sessions as per the schedule T/Th/Sa with her last HD session being yesterday.  Reports that she has chest pain in the mid chest that is chronic for years now.   Pt denies any smoking, alcohol or any form of substance abuse.   In the ED,   Pt is AAOX3, moderate distress due to pain   Vitals- 113/ 68, HR- 77, afebrile  wbc- 10.37  Hb 7.1  UA- UTI   EKG- NSR (29 Jul 2020 20:41)    Course notable for LUE cellulitis, excision of graft, 7/30/2020, bacteremia, blood loss anemia, lung nodules noted on imaging.  CT chest planned.  D/C planning underway.  For goals of care.    Patient reports that she came into the hospital due to "a capps capps on my left arm," present for two weeks.  Endorses associated malaise, denies associated fever/chills.  Patient is inquiring about discharge; noted to have declined GERARD. When asked about her thoughts around rehab, she states that she will do "whatever is needed."      PAST MEDICAL & SURGICAL HISTORY:  DM (diabetes mellitus)  Vitamin D deficiency  ESRD (end stage renal disease)  Hyperphosphatemia  Hypertension  S/P bunionectomy: bilateral great toes  H/O hemorrhoidectomy  H/O oral cancer  AVF (arteriovenous fistula): x3      SOCIAL HISTORY:    Admitted from:  home  Substance abuse history:      denies          Roman Catholic:        [employer] ; declines chaplaincy support                           Preferred Language: English    Surrogate/HCP/Guardian:  sister: Radha Vargas noted as contact           Phone#: [phone].  Patient and Radha Vargas reside together.    Reports that she is a , and has one daughter, Jina Arroyo, who resides in [place], with her , who is in the army.  Shares that her older sister, Neeta Soliz helps her make medical decisions.  States that neither she nor her sisterRadha have her phone number.      FAMILY HISTORY:  Family history of diabetes mellitus in sister (Sibling): and HTN    Baseline ADLs (prior to admission): uses walker at home.  Uses walker outside her home.  Family members help provide care for her.  Loved ones come in about 8 hrs/day.  Resides with her sisterRadha.    Allergies    sulfADIAZINE (Angioedema)    Intolerances      Present Symptoms:   Dyspnea: denies  Nausea/Vomiting: denies  Anxiety: denies  Fatigue: endorses  Weakness: generalized, endorses  Loss of appetite: states her appetite is ok.  20 pound weight loss reported over months  Pain:      denies                         Review of Systems: All others negative     MEDICATIONS  (STANDING):  chlorhexidine 2% Cloths 1 Application(s) Topical <User Schedule>  chlorhexidine 2% Cloths 1 Application(s) Topical daily  dextrose 5% + sodium chloride 0.9%. 1000 milliLiter(s) (50 mL/Hr) IV Continuous <Continuous>  epoetin joey-epbx (RETACRIT) Injectable 4000 Unit(s) IV Push <User Schedule>  metoprolol tartrate 12.5 milliGRAM(s) Oral two times a day  mupirocin 2% Nasal 1 Application(s) Nasal every 12 hours    MEDICATIONS  (PRN):  acetaminophen   Tablet .. 650 milliGRAM(s) Oral every 6 hours PRN Mild Pain (1 - 3)  HYDROmorphone  Injectable 0.5 milliGRAM(s) IV Push every 6 hours PRN Moderate Pain (4 - 6)      PHYSICAL EXAM:    Vital Signs Last 24 Hrs  T(C): 36.7 (06 Aug 2020 05:17), Max: 36.8 (05 Aug 2020 18:30)  T(F): 98 (06 Aug 2020 05:17), Max: 98.3 (05 Aug 2020 18:30)  HR: 74 (06 Aug 2020 11:05) (64 - 87)  BP: 141/91 (06 Aug 2020 11:05) (119/74 - 173/95)  BP(mean): --  RR: 18 (06 Aug 2020 05:17) (18 - 18)  SpO2: 97% (06 Aug 2020 11:05) (97% - 100%)    General: alert  oriented x 2-3 (uncertain of facility name.  Alert to self, time)     Karnofsky Performance Score/Palliative Performance Status Version2:  40   %    HEENT: NC/AT, sclerae non-icteric  Lungs: comfortable  CV:S1, S2  GI: normal: soft, non-tender, + bowel sounds               PEG/NG/OG tube  constipation  last BM:   : no mayo, on HD  Musculoskeletal: general weakness, requires walker   Skin: warm and dry, LUE in wrap.    Peripheral Vasc: LUE swelling  Neuro: alert and oriented x 2-3 (self, time, uncertain of hospital name).  Moves all extremities  Oral intake ability: fair  Diet:     LABS:                        9.4    8.99  )-----------( 344      ( 06 Aug 2020 07:10 )             29.3     08-06    142  |  103  |  7   ----------------------------<  81  3.9   |  29  |  3.03<H>    Ca    8.3<L>      06 Aug 2020 07:10  Phos  3.9     08-05  Mg     2.0     08-05    TPro  6.7  /  Alb  1.8<L>  /  TBili  0.5  /  DBili  x   /  AST  23  /  ALT  17  /  AlkPhos  241<H>  08-05        RADIOLOGY & ADDITIONAL STUDIES:    ADVANCE DIRECTIVES: full code orders  Advanced Care Planning discussion total time spent:  20 min HPI:  69F from [facility], with PMH of HTN, ESRD, DM, Hyperphosphatemia,  Oral cancer(s/p excision) , comes to the ED  with left arm AV fistula pain and swelling x1 week. Pt was in her usual state of health until last week when she went for her HD session and developed gradual onset of swelling and pain at the AVF site on L arm. As per the patient the HD nurse had inserted the needle incorrectly that caused the swelling and severe pain. AVF however was not occluded and she had her HD sessions as per the schedule T/Th/Sa with her last HD session being yesterday.  Reports that she has chest pain in the mid chest that is chronic for years now.   Pt denies any smoking, alcohol or any form of substance abuse.   In the ED,   Pt is AAOX3, moderate distress due to pain   Vitals- 113/ 68, HR- 77, afebrile  wbc- 10.37  Hb 7.1  UA- UTI   EKG- NSR (29 Jul 2020 20:41)    Course notable for LUE cellulitis, excision of graft, 7/30/2020, bacteremia, blood loss anemia, lung nodules noted on imaging.  CT chest planned.  D/C planning underway.  For goals of care.    Patient reports that she came into the hospital due to "a capps capps on my left arm," present for two weeks.  Endorses associated malaise, denies associated fever/chills.  Patient is inquiring about discharge; noted to have declined GERARD. When asked about her thoughts around rehab, she states that she will do "whatever is needed."      PAST MEDICAL & SURGICAL HISTORY:  DM (diabetes mellitus)  Vitamin D deficiency  ESRD (end stage renal disease)  Hyperphosphatemia  Hypertension  S/P bunionectomy: bilateral great toes  H/O hemorrhoidectomy  H/O oral cancer  AVF (arteriovenous fistula): x3      SOCIAL HISTORY:    Admitted from:  home  Substance abuse history:      denies          Religious:        Roman Catholic ; declines chaplaincy support                           Preferred Language: English    Surrogate/HCP/Guardian:  sister: Radha Vagras noted as contact           Phone#: [phone].  Patient and Radha Vargas reside together.    Reports that she is a , and has one daughter, Jina Arroyo, who resides in [place], with her , who is in the army.  Shares that her older sister, Neeta Soliz helps her make medical decisions.  States that neither she nor her sisterRadha have her phone number.      FAMILY HISTORY:  Family history of diabetes mellitus in sister (Sibling): and HTN    Baseline ADLs (prior to admission): uses walker at home.  Uses walker outside her home.  Family members help provide care for her.  Loved ones come in about 8 hrs/day.  Resides with her sisterRadha.    Allergies    sulfADIAZINE (Angioedema)    Intolerances      Present Symptoms:   Dyspnea: denies  Nausea/Vomiting: denies  Anxiety: denies  Fatigue: endorses  Weakness: generalized, endorses  Loss of appetite: states her appetite is ok.  20 pound weight loss reported over months  Pain:      denies                         Review of Systems: All others negative     MEDICATIONS  (STANDING):  chlorhexidine 2% Cloths 1 Application(s) Topical <User Schedule>  chlorhexidine 2% Cloths 1 Application(s) Topical daily  dextrose 5% + sodium chloride 0.9%. 1000 milliLiter(s) (50 mL/Hr) IV Continuous <Continuous>  epoetin joey-epbx (RETACRIT) Injectable 4000 Unit(s) IV Push <User Schedule>  metoprolol tartrate 12.5 milliGRAM(s) Oral two times a day  mupirocin 2% Nasal 1 Application(s) Nasal every 12 hours    MEDICATIONS  (PRN):  acetaminophen   Tablet .. 650 milliGRAM(s) Oral every 6 hours PRN Mild Pain (1 - 3)  HYDROmorphone  Injectable 0.5 milliGRAM(s) IV Push every 6 hours PRN Moderate Pain (4 - 6)      PHYSICAL EXAM:    Vital Signs Last 24 Hrs  T(C): 36.7 (06 Aug 2020 05:17), Max: 36.8 (05 Aug 2020 18:30)  T(F): 98 (06 Aug 2020 05:17), Max: 98.3 (05 Aug 2020 18:30)  HR: 74 (06 Aug 2020 11:05) (64 - 87)  BP: 141/91 (06 Aug 2020 11:05) (119/74 - 173/95)  BP(mean): --  RR: 18 (06 Aug 2020 05:17) (18 - 18)  SpO2: 97% (06 Aug 2020 11:05) (97% - 100%)    General: alert  oriented x 2-3 (uncertain of facility name.  Alert to self, time)     Karnofsky Performance Score/Palliative Performance Status Version2:  40   %    HEENT: NC/AT, sclerae non-icteric  Lungs: comfortable  CV:S1, S2  GI: normal: soft, non-tender, + bowel sounds                last BM: this AM  : no mayo, on HD  Musculoskeletal: general weakness, requires walker   Skin: warm and dry, LUE in wrap.    Peripheral Vasc: LUE swelling  Neuro: alert and oriented x 2-3 (self, time, uncertain of hospital name).  Moves all extremities  Oral intake ability: fair  Diet:     LABS:                        9.4    8.99  )-----------( 344      ( 06 Aug 2020 07:10 )             29.3     08-06    142  |  103  |  7   ----------------------------<  81  3.9   |  29  |  3.03<H>    Ca    8.3<L>      06 Aug 2020 07:10  Phos  3.9     08-05  Mg     2.0     08-05    TPro  6.7  /  Alb  1.8<L>  /  TBili  0.5  /  DBili  x   /  AST  23  /  ALT  17  /  AlkPhos  241<H>  08-05        RADIOLOGY & ADDITIONAL STUDIES:    ADVANCE DIRECTIVES: full code orders  Advanced Care Planning discussion total time spent:  20 min

## 2020-08-06 NOTE — PROGRESS NOTE ADULT - PROBLEM SELECTOR PLAN 2
Bacteremia (coag neg staph)   - s/p removal of AV graft on 7/30   - Last dose of Vanco was on 8/1/20, holding Vanco for now    - F/u pre dialysis vancomycin level, give vancomycin 1gm intra dialysis if vanc level <20   - Echo G1DD, EF 70%   - Third set of blood cultures prelim negative   - ID Dr. Boston  - Vanco trough today was high (28.9). So will not give a Vanco dose. Bacteremia (coag neg staph)   - s/p removal of AV graft on 7/30   - Last dose of Vanco was on 8/1/20, holding Vanco for now    - F/u pre dialysis vancomycin level, give vancomycin 1gm intra dialysis if vanc level <20   - Echo G1DD, EF 70%   - Third set of blood cultures prelim negative   - ID Dr. Boston

## 2020-08-06 NOTE — DISCHARGE NOTE PROVIDER - NSDCMRMEDTOKEN_GEN_ALL_CORE_FT
acetaminophen 325 mg oral tablet: 2 tab(s) orally every 6 hours, As needed, Mild Pain (1 - 3)  aspirin 81 mg oral delayed release tablet: 1 tab(s) orally once a day  calcium acetate 667 mg oral capsule: 2 cap(s) orally 3 times a day  heparin: 5000 unit(s) subcutaneous every 12 hours  metoprolol succinate 25 mg oral tablet, extended release: 1 tab(s) orally once a day  morphine: 2 milligram(s) intravenous every 6 hours, As Needed severe pain  Nephro-Sameer oral tablet: 1 tab(s) orally once a day  vancomycin 1 g intravenous injection: 1 gram(s) intravenous Tuesday, Thursday, Saturday at 12pm acetaminophen 325 mg oral tablet: 2 tab(s) orally every 6 hours, As needed, Mild Pain (1 - 3)  aspirin 81 mg oral delayed release tablet: 1 tab(s) orally once a day  calamine topical lotion: 1 application topically once a day, As needed, Itching  calcium acetate 667 mg oral capsule: 2 cap(s) orally 3 times a day  heparin: 5000 unit(s) subcutaneous every 12 hours  metoprolol succinate 25 mg oral tablet, extended release: 1 tab(s) orally once a day  morphine: 2 milligram(s) intravenous every 6 hours, As Needed severe pain  Nephro-Sameer oral tablet: 1 tab(s) orally once a day  vancomycin 1 g intravenous injection: 1 gram(s) intravenous Tuesday, Thursday, Saturday at 12pm acetaminophen 325 mg oral tablet: 2 tab(s) orally every 6 hours, As needed, Mild Pain (1 - 3)  aspirin 81 mg oral delayed release tablet: 1 tab(s) orally once a day  calamine topical lotion: 1 application topically once a day, As needed, Itching  calcium acetate 667 mg oral capsule: 2 cap(s) orally 3 times a day  heparin: 5000 unit(s) subcutaneous every 12 hours  insulin lispro 100 units/mL subcutaneous solution: subcutaneous 3 times a day with meals as needed  -200 1U  201-250 2U  251-300 3U  301-350 4U  351-400 5U  metoprolol succinate 25 mg oral tablet, extended release: 1 tab(s) orally once a day  Nephro-Sameer oral tablet: 1 tab(s) orally once a day  vancomycin 1 g intravenous injection: 1 gram(s) intravenous Monday, Wednesday, and Friday  at 12pm acetaminophen 325 mg oral tablet: 2 tab(s) orally every 6 hours, As needed, Mild Pain (1 - 3)  ascorbic acid 100 mg oral tablet, disintegratin tab(s) orally once a day   aspirin 81 mg oral delayed release tablet: 1 tab(s) orally once a day  calamine topical lotion: 1 application topically once a day, As needed, Itching  calcium acetate 667 mg oral capsule: 2 cap(s) orally 3 times a day  Daily Multiple Vitamins oral tablet: 1 tab(s) orally once a day   heparin: 5000 unit(s) subcutaneous every 12 hours  insulin lispro 100 units/mL subcutaneous solution: subcutaneous 3 times a day with meals as needed  -200 1U  201-250 2U  251-300 3U  301-350 4U  351-400 5U  metoprolol succinate 25 mg oral tablet, extended release: 1 tab(s) orally once a day  Nephro-Sameer oral tablet: 1 tab(s) orally once a day  vancomycin 1 g intravenous injection: 1 gram(s) intravenous Monday, Wednesday, and Friday  at 12pm

## 2020-08-06 NOTE — CONSULT NOTE ADULT - ASSESSMENT
70yo with open wound of her upper arm. No exposed neurovascular structures No signs of infection in the open wound. I discussed allowing the dusky skin in the wound to demarcate further before potentially skin grafting the wound.  - Recommend hand elevation above the level of her heart  - nutrition consultation to improve her nutrition status, will need to improve this prior to discussing any surgical options  - aquacell ag just the silver dressing portion and not the duoderm to be applied to the open areas of the wound  - Pt may follow up with me in 2 weeks  - Call with questions [phone]  - Pt may benefit from hyperbaric oxygen, would recommend she obtain consultation for this

## 2020-08-06 NOTE — PROGRESS NOTE ADULT - PROBLEM SELECTOR PLAN 1
Patient is hemodynamically stable now.  - AAOx3 at baseline  Was transferred from ICU to the floor now Patient is hemodynamically stable now.  - AAOx3 at baseline  Was transferred from ICU to the floor now  Vitals are normal and stable now on the floor

## 2020-08-06 NOTE — CONSULT NOTE ADULT - CONSULT REQUESTED BY NAME
Dr Knight
Dr Parks
Dr. Knight
Dr. Knight
Dr. Manzanares
[place]
MD Angelic (ED)
[facility]
Dr. Remy

## 2020-08-06 NOTE — DISCHARGE NOTE PROVIDER - NSDCCPCAREPLAN_GEN_ALL_CORE_FT
PRINCIPAL DISCHARGE DIAGNOSIS  Diagnosis: Septic shock  Assessment and Plan of Treatment: You came to hospital with sepsis and was admitted to ICU as bp was very low and you needed ICU level of care. You were managed with IV fluids( based on kidney function). antibiotics. You source of infection is from AV graft and blood . Please finish your prescribed antibiotic course.   It is very important to be compliant with your meds and follow up.      SECONDARY DISCHARGE DIAGNOSES  Diagnosis: Bacteremia  Assessment and Plan of Treatment: You were found to have blood infection due to bacteria. We gave you IV antibiotics with the goal of clearing infections. You need 4-6 weeks of antibiotic to finish course.  Vancomycin level needs to be checked before every dialysis session to monitor if levels are high and we need to hold the medications.    Diagnosis: Anemia  Assessment and Plan of Treatment: Your hemoglobin was found to be low and we gave you blood transfusions. Accordign to our assessment, anemia is due to multiple causes like kidney disease, iron deficiency. You will benefit from Epogen and iron supplements,. Please follow up with your PCP regularly.    Diagnosis: ESRD (end stage renal disease)  Assessment and Plan of Treatment: Please get regularl scheduled diaylsis and follow up with nephrologist and PCP.    Diagnosis: AVF (arteriovenous fistula)  Assessment and Plan of Treatment: Your AV fistula was found to be infected and we have removed the gradft. You need to follow up with vascular surgery outpatient for further work up of fistula or graft.    Diagnosis: Hypertension  Assessment and Plan of Treatment: Your blood pressure was well-controlled during your admission. Continue with your current regimen of anti-hypertensive medications as mentioned in your discharge summary and ensure that you follow up with your primary care provider for further recommendations and monitoring.    Diagnosis: DM (diabetes mellitus)  Assessment and Plan of Treatment: Your sugar was controlled in hospital, we gave you insulin in hospital. You do not need any medication for diabetes as of now.    Diagnosis: Vitamin D deficiency  Assessment and Plan of Treatment: Contue taking your supplements and rpeat levels in 6-8 weeks PRINCIPAL DISCHARGE DIAGNOSIS  Diagnosis: Septic shock  Assessment and Plan of Treatment: You came to hospital with sepsis and was admitted to ICU as bp was very low and you needed ICU level of care. You were managed with IV fluids( based on kidney function). antibiotics. You source of infection is from AV graft and blood . Please finish your prescribed antibiotic course.   It is very important to be compliant with your meds and follow up.      SECONDARY DISCHARGE DIAGNOSES  Diagnosis: AV graft malfunction  Assessment and Plan of Treatment: You were found to have malfunctioning AV graft ,It was infected so we removed it in the hospital. We have placed dialysis catheter for dialysis  You need good care for wound site. and follow up with Dr Galeas in 2 weeks for further evaluation.  Wound Care instructions: daily LUE dressing changes with WTD in axillary pole, xeroform, dry gauze and kerlex  continue elevation of LUE      Diagnosis: Bacteremia  Assessment and Plan of Treatment: You were found to have blood infection due to bacteria. We gave you IV antibiotics with the goal of clearing infections. You need 4-6 weeks of antibiotic to finish course.  Vancomycin level needs to be checked before every dialysis session to monitor if levels are high and we need to hold the medications.    Diagnosis: Anemia  Assessment and Plan of Treatment: Your hemoglobin was found to be low and we gave you blood transfusions. Accordign to our assessment, anemia is due to multiple causes like kidney disease, iron deficiency. You will benefit from Epogen and iron supplements,. Please follow up with your PCP regularly.    Diagnosis: ESRD (end stage renal disease)  Assessment and Plan of Treatment: Please get regularly scheduled diaylsis and follow up with nephrologist and PCP.    Diagnosis: Hypertension  Assessment and Plan of Treatment: Your blood pressure was well-controlled during your admission. Continue with your current regimen of anti-hypertensive medications as mentioned in your discharge summary and ensure that you follow up with your primary care provider for further recommendations and monitoring.    Diagnosis: DM (diabetes mellitus)  Assessment and Plan of Treatment: Your sugar was controlled in hospital, we gave you insulin in hospital. You do not need any medication for diabetes as of now.    Diagnosis: Vitamin D deficiency  Assessment and Plan of Treatment: Contue taking your supplements and rpeat levels in 6-8 weeks

## 2020-08-06 NOTE — CONSULT NOTE ADULT - CONVERSATION DETAILS
-Discussed patient's HD experience: 4 years, has tolerated it well.  -Discussion of health care proxy role: patient reports that she makes decisions re: her care independently, and if she needed someone to make decisions on her behalf, she would have her older sister, TARSHA Soliz make them for her.  She declines assigning her as her health care proxy in this moment, noting that she wants to talk to her.  States that neither she nor her sister Radha has her number, and that she will be able to get it next week.  -Current hospitalization discussed.  -Patient states that she wants life-prolonging measures, including CPR and intubation.  When asked if there were circumstances or situations where she may not want all life-prolonging measures taken, she states that she cannot think of any. -Discussed patient's HD experience: 4 years, has tolerated it well.  -Discussion of health care proxy role: patient reports that she makes decisions re: her care independently, and if she needed someone to make decisions on her behalf, she would have her older sister, Neeta Soliz make them for her.  She declines assigning her as her health care proxy in this moment, noting that she wants to talk to her.  States that neither she nor her sister Radha has her number, and that she will be able to get it next week.  -Current hospitalization discussed.  -Patient states that she wants life-prolonging measures, including CPR and intubation.  When asked if there were circumstances or situations where she may not want all life-prolonging measures taken, she states that she cannot think of any.

## 2020-08-06 NOTE — CONSULT NOTE ADULT - ASSESSMENT
1. Infected Prosthetic Vascular Graft   - LUE  - with Cellulitis   - Vasc surg F/U   - Had Excision and Ligation of arteriovenous fistula/access graft of upper extremity 7/30/20  - Wound care   - Abx   - ID F/U     2. UTI  - Abx   - Repeat UA/C&S    3. ESRD   - On HD   - Renal F/U     4. DM   - Accuchecks  - Regular insulin coverage per HSS     5. Pulmonary Nodules  - seen on CTA neck   - CT chest pending   - Quantiferon TB gold   - D/C to [facility] pending above.

## 2020-08-06 NOTE — CONSULT NOTE ADULT - ATTENDING COMMENTS
avoid avg cannulation for now, check duplex.  if needs urgent HD would rec temporary catheter.  monitor for signs of infection
MICU ATTENDING  I have personally seen and examined the pt. I was physically present for the key elements service provided. I agree with above history, physical and plan which I edited where appropriate. I total spent 35 min critical care time.   68 y/o DM HTN ESRD presented with 1 week of LUE swelling and pain. underwent AVG excision and ligation. transiently hypotensive in OR.    A/P S/P AVG excision and ligation   Gr (+) bacteremia  ESRD  anemia  hypotension    MICU   CBC q 6 hours   Transfuse as needed   HD as per renal    Vascular surgery follow up  Hold antiHTN  Monitor BP  CONt Vanco, Zosyn   check Vanco trough   redose if trough <20  ID follow
69 y F hx ESRD on HD downgraded from ICU, treated for septic shock 2/2 AVG infection s/p OR 7/30. Now has RIJ permacath. Found to have multiple lung nodules on neck CT, chest CT pending, pulm to evaluate. Alert, frail, sitting in chair, NAD. Hopes to be discharged soon. PT recomm [facility], initially refusing, now seems more amenable. Identifies her sister Neeta as primary surrogate as her daughter resides in [place]. Wishes to remains FULL CODE at this time. Palliative will follow as needed.

## 2020-08-06 NOTE — CONSULT NOTE ADULT - CONSULT REQUESTED DATE/TIME
06-Aug-2020 11:28
06-Aug-2020 11:45
06-Aug-2020 18:51
29-Jul-2020 17:38
30-Jul-2020 18:23
31-Jul-2020
31-Jul-2020
31-Jul-2020 02:02
01-Aug-2020 11:38

## 2020-08-06 NOTE — PROGRESS NOTE ADULT - SUBJECTIVE AND OBJECTIVE BOX
CHIEF COMPLAINT:Patient is a 69y old  Female who presents with a chief complaint of Painful swelling at AV fistula. Pt appears comfortable.    	  REVIEW OF SYSTEMS:  CONSTITUTIONAL: No fever, weight loss, or fatigue  EYES: No eye pain, visual disturbances, or discharge  ENT:  No difficulty hearing, tinnitus, vertigo; No sinus or throat pain  NECK: No pain or stiffness  RESPIRATORY: No cough, wheezing, chills or hemoptysis; No Shortness of Breath  CARDIOVASCULAR: No chest pain, palpitations, passing out, dizziness, or leg swelling  GASTROINTESTINAL: No abdominal or epigastric pain. No nausea, vomiting, or hematemesis; No diarrhea or constipation. No melena or hematochezia.  GENITOURINARY: No dysuria, frequency, hematuria, or incontinence  NEUROLOGICAL: No headaches, memory loss, loss of strength, numbness, or tremors  SKIN: No itching, burning, rashes, or lesions   LYMPH Nodes: No enlarged glands  ENDOCRINE: No heat or cold intolerance; No hair loss  MUSCULOSKELETAL: No joint pain or swelling; No muscle, back, or extremity pain  PSYCHIATRIC: No depression, anxiety, mood swings, or difficulty sleeping  HEME/LYMPH: No easy bruising, or bleeding gums  ALLERGY AND IMMUNOLOGIC: No hives or eczema	        PHYSICAL EXAM:  T(C): 36.7 (08-06-20 @ 05:17), Max: 37.1 (08-05-20 @ 11:02)  HR: 76 (08-06-20 @ 05:17) (64 - 87)  BP: 119/74 (08-06-20 @ 05:17) (119/74 - 173/95)  RR: 18 (08-06-20 @ 05:17) (18 - 18)  SpO2: 100% (08-06-20 @ 05:17) (100% - 100%)    I&O's Summary    05 Aug 2020 07:01  -  06 Aug 2020 07:00  --------------------------------------------------------  IN: 400 mL / OUT: 1277 mL / NET: -877 mL        Appearance: Normal	  HEENT:   Normal oral mucosa, PERRL, EOMI	  Lymphatic: No lymphadenopathy  Cardiovascular: Normal S1 S2, No JVD, No murmurs, No edema  Respiratory: Lungs clear to auscultation	  Psychiatry: A & O x 3, Mood & affect appropriate  Gastrointestinal:  Soft, Non-tender, + BS	  Skin: No rashes, No ecchymoses, No cyanosis	  Neurologic: Non-focal  Extremities: Normal range of motion, No clubbing, cyanosis or edema  Vascular: Peripheral pulses palpable 2+ bilaterally    MEDICATIONS  (STANDING):  chlorhexidine 2% Cloths 1 Application(s) Topical <User Schedule>  chlorhexidine 2% Cloths 1 Application(s) Topical daily  dextrose 5% + sodium chloride 0.9%. 1000 milliLiter(s) (50 mL/Hr) IV Continuous <Continuous>  epoetin joey-epbx (RETACRIT) Injectable 4000 Unit(s) IV Push <User Schedule>  metoprolol tartrate 12.5 milliGRAM(s) Oral two times a day  mupirocin 2% Nasal 1 Application(s) Nasal every 12 hours      	  LABS:	 	                      9.4    8.99  )-----------( 344      ( 06 Aug 2020 07:10 )             29.3     08-06    142  |  103  |  7   ----------------------------<  81  3.9   |  29  |  3.03<H>    Ca    8.3<L>      06 Aug 2020 07:10  Phos  3.9     08-05  Mg     2.0     08-05    TPro  6.7  /  Alb  1.8<L>  /  TBili  0.5  /  DBili  x   /  AST  23  /  ALT  17  /  AlkPhos  241<H>  08-05      Lipid Profile: Cholesterol <50  LDL <24  HDL 11  TG 77      TSH: Thyroid Stimulating Hormone, Serum: 3.09 uU/mL (07-30 @ 06:31)      	  Culture - Blood (08.02.20 @ 11:14)    Specimen Source: .Blood Blood-Peripheral    Culture Results:   No growth to date.

## 2020-08-06 NOTE — CONSULT NOTE ADULT - NS PRO AD NO ADVANCE DIRECTIVE
No/Patient states that she makes decisions together with her older sister, TARSHA Soliz.  She does not wish to formally name her as proxy now.

## 2020-08-06 NOTE — PROGRESS NOTE ADULT - SUBJECTIVE AND OBJECTIVE BOX
Problem List:  ESRD  Post right PC and dialysis yesterday  Reduced appetite  Pulmonary nodules      PAST MEDICAL & SURGICAL HISTORY:  DM (diabetes mellitus)  Vitamin D deficiency  ESRD (end stage renal disease)  Hyperphosphatemia  Hypertension  S/P bunionectomy: bilateral great toes  H/O hemorrhoidectomy  H/O oral cancer  AVF (arteriovenous fistula): x3      sulfADIAZINE (Angioedema)      MEDICATIONS  (STANDING):  chlorhexidine 2% Cloths 1 Application(s) Topical <User Schedule>  chlorhexidine 2% Cloths 1 Application(s) Topical daily  dextrose 5% + sodium chloride 0.9%. 1000 milliLiter(s) (50 mL/Hr) IV Continuous <Continuous>  epoetin joey-epbx (RETACRIT) Injectable 4000 Unit(s) IV Push <User Schedule>  metoprolol tartrate 12.5 milliGRAM(s) Oral two times a day  mupirocin 2% Nasal 1 Application(s) Nasal every 12 hours    MEDICATIONS  (PRN):  acetaminophen   Tablet .. 650 milliGRAM(s) Oral every 6 hours PRN Mild Pain (1 - 3)  HYDROmorphone  Injectable 0.5 milliGRAM(s) IV Push every 6 hours PRN Moderate Pain (4 - 6)                            9.4    8.99  )-----------( 344      ( 06 Aug 2020 07:10 )             29.3     08-06    142  |  103  |  7   ----------------------------<  81  3.9   |  29  |  3.03<H>    Ca    8.3<L>      06 Aug 2020 07:10  Phos  3.9     08-05  Mg     2.0     08-05    TPro  6.7  /  Alb  1.8<L>  /  TBili  0.5  /  DBili  x   /  AST  23  /  ALT  17  /  AlkPhos  241<H>  08-05    PT/INR - ( 05 Aug 2020 10:35 )   PT: 16.0 sec;   INR: 1.39 ratio                 REVIEW OF SYSTEMS:  reduced appetite  Pain in LUE        VITALS:  T(F): 98 (08-06-20 @ 05:17), Max: 98.3 (08-05-20 @ 18:30)  HR: 74 (08-06-20 @ 11:05)  BP: 141/91 (08-06-20 @ 11:05)  RR: 18 (08-06-20 @ 05:17)  SpO2: 97% (08-06-20 @ 11:05)  Wt(kg): --    08-05 @ 07:01  -  08-06 @ 07:00  --------------------------------------------------------  IN: 400 mL / OUT: 1277 mL / NET: -877 mL        PHYSICAL EXAM:  Constitutional: well developed, no diaphoresis, no distress.  Neck: No JVD, no carotid bruit, supple, no adenopathy  Respiratory: Good air entrance B/L, no wheezes, rales or rhonchi  Cardiovascular: S1, S2, RRR, no pericardial rub, no murmur  Abdomen: BS+, soft, no tenderness, no bruit  Pelvis: bladder nondistended  Extremities: No cyanosis or clubbing. No peripheral edema.     Vascular Access: RIGHT pc

## 2020-08-06 NOTE — DISCHARGE NOTE PROVIDER - PROVIDER TOKENS
PROVIDER:[TOKEN:[[id number]]],PROVIDER:[TOKEN:[[id number]]] PROVIDER:[TOKEN:[[id number]]],PROVIDER:[TOKEN:[[id number]]],PROVIDER:[TOKEN:[[id number]],FOLLOWUP:[2 weeks]]

## 2020-08-06 NOTE — PROGRESS NOTE ADULT - SUBJECTIVE AND OBJECTIVE BOX
Patient is a 69y old  Female who presents with a chief complaint of Painful swelling at AV fistula. (05 Aug 2020 18:00)    pt seen in tele [ x ], reg med floor [   ], bed [ x ], chair at bedside [   ], a+o x3 [ x ], lethargic [  ],    nad [ x ]      Allergies    sulfADIAZINE (Angioedema)        Vitals    T(F): 98 (08-06-20 @ 05:17), Max: 98.8 (08-05-20 @ 11:02)  HR: 76 (08-06-20 @ 05:17) (64 - 87)  BP: 119/74 (08-06-20 @ 05:17) (119/74 - 173/95)  RR: 18 (08-06-20 @ 05:17) (18 - 18)  SpO2: 100% (08-06-20 @ 05:17) (100% - 100%)  Wt(kg): --  CAPILLARY BLOOD GLUCOSE      POCT Blood Glucose.: 115 mg/dL (05 Aug 2020 22:04)      Labs                          10.6   10.13 )-----------( 406      ( 05 Aug 2020 10:35 )             32.7       08-05    139  |  103  |  6<L>  ----------------------------<  91  4.1   |  30  |  2.53<H>    Ca    8.3<L>      05 Aug 2020 22:42  Phos  3.9     08-05  Mg     2.0     08-05    TPro  6.7  /  Alb  1.8<L>  /  TBili  0.5  /  DBili  x   /  AST  23  /  ALT  17  /  AlkPhos  241<H>  08-05            .Blood Blood-Peripheral  08-02 @ 11:14   No growth to date.  --  --      .Blood Blood-Peripheral  07-31 @ 08:06   Growth in aerobic and anaerobic bottles: Staphylococcus epidermidis  "Susceptibilities not performed"  --    Growth in aerobic bottle: Gram Positive Cocci in Clusters  Growth in anaerobic bottle: Gram Positive Cocci in Clusters      .Surgical Swab left arm av graft C&S  07-31 @ 06:41   Normal skin albin isolated  --  --      .Urine Clean Catch (Midstream)  07-30 @ 01:34   >=3 organisms. Probable collection contamination.  --  --      .Blood Blood-Peripheral  07-29 @ 23:02   Growth in aerobic and anaerobic bottles: Staphylococcus epidermidis  "Due to technical problems, Proteus sp. will Not be reported as part of  the BCID panel until further notice"  ***Blood Panel PCR results on this specimen are available  approximately 3 hours after the Gram stain result.***  Gram stain, PCR, and/or culture results may not always  correspond due to difference in methodologies.  ************************************************************  This PCR assay was performed using [employer].  The following targets are tested for: Enterococcus,  vancomycin resistant enterococci, Listeria monocytogenes,  coagulase negative staphylococci, S. aureus,  methicillin resistant S. aureus, Streptococcus agalactiae  (Group B), S. pneumoniae, S. pyogenes (Group A),  Acinetobacter baumannii, Enterobacter cloacae, E. coli,  Klebsiella oxytoca, K. pneumoniae, Proteus sp.,  Serratia marcescens, Haemophilus influenzae,  Neisseria meningitidis, Pseudomonas aeruginosa, Candida  albicans, C. glabrata, C krusei, C parapsilosis,  C. tropicalis and the KPC resistance gene.  --  Blood Culture PCR          Radiology Results      Meds    MEDICATIONS  (STANDING):  chlorhexidine 2% Cloths 1 Application(s) Topical <User Schedule>  chlorhexidine 2% Cloths 1 Application(s) Topical daily  dextrose 5% + sodium chloride 0.9%. 1000 milliLiter(s) (50 mL/Hr) IV Continuous <Continuous>  epoetin joey-epbx (RETACRIT) Injectable 4000 Unit(s) IV Push <User Schedule>  metoprolol tartrate 12.5 milliGRAM(s) Oral two times a day      MEDICATIONS  (PRN):  acetaminophen   Tablet .. 650 milliGRAM(s) Oral every 6 hours PRN Mild Pain (1 - 3)  HYDROmorphone  Injectable 0.5 milliGRAM(s) IV Push every 6 hours PRN Moderate Pain (4 - 6)      Physical Exam    Neuro :  no focal deficits  Respiratory: CTA B/L  CV: RRR, S1S2, no murmurs,   Abdominal: Soft, NT, ND +BS,  Extremities: No edema, + peripheral pulses, left ue dressing cdi, distal forearm and hand edema,      ASSESSMENT      Infected prosthetic vascular graft LUE  Pseudoaneurysm of LUE arteriovenous graft  Cellulitis of left upper extremity    uti   a/p acute blood loss anemia  h/o ESRD on hd,   DM (diabetes mellitus)  Vitamin D deficiency  Hyperphosphatemia  Hypertension  S/P bunionectomy  hemorrhoidectomy  oral cancer  left AVF (arteriovenous fistula)      PLAN      s/p Excision, infected graft, extremity and Ligation of arteriovenous fistula or access graft of upper extremity 7/30/20  vasc surg f/u    cont pain control  cont wound care  gi/dvt prophylaxis  cont zosyn,   id f/u   f/u Vancomycin level and re-dose if less than 30  Dose Vancomycin based on daily level   Wound care as per Vascular team  f/u ELIZABETH since TTE is negative and AVG Cx is also negative  blood cx with  Staphylococcus epidermidis noted above  rept blood cx  8/2 neg noted above  Surgical Swab left arm av graft C&S with Normal skin albin isolated noted above   echo with Normal mitral valve. Normal trileaflet aortic valve. Mild aortic regurgitation.  Hyperdynamic left ventricular systolic function (EF >70%). Grade I diastolic dysfunction noted above  cardio f/u   D/W Pt risk and benefit of ELIZABETH, she declines and will treat 4-6 of ABX  cta neck with No cervical fluid collection. If malignancy is a strong clinical concern, PET CT exam recommended for further evaluation. Multiple pulmonary nodules as described above. CT chest recommended noted above  ct abd pelv with No retroperitoneal hematoma. Large amount of stool within the rectum.   cont senna 2 tabs qhs  cont miralax daily   malfunctioning moshe s/p shiley exchange at bedside 8/3/20  moshe again not working and pt to have perma cath placement today  d/w id who states no contraindication to catheter placement since blood cx is neg  renal f/u   post CTA with contrast s/p dialysis yesterday with zero  net fluid removal , 3 K bath  Epogen 4000 TIW  pt off pressors  hgba1c 5.4   endo f/u   fasting , prandial controlled  serum glucose tightly controlled  low dose sliding scale stopped  reassess based on requirements  goal inpatient glucose range: 140-180mg/d   swallow eval noted and rec Puree solids with Nectar Thick liquids.  cont current meds Patient is a 69y old  Female who presents with a chief complaint of Painful swelling at AV fistula. (05 Aug 2020 18:00)    pt seen in tele [ x ], reg med floor [   ], bed [ x ], chair at bedside [   ], a+o x3 [ x ], lethargic [  ],    nad [ x ], right chest permacath [x]      Allergies    sulfADIAZINE (Angioedema)        Vitals    T(F): 98 (08-06-20 @ 05:17), Max: 98.8 (08-05-20 @ 11:02)  HR: 76 (08-06-20 @ 05:17) (64 - 87)  BP: 119/74 (08-06-20 @ 05:17) (119/74 - 173/95)  RR: 18 (08-06-20 @ 05:17) (18 - 18)  SpO2: 100% (08-06-20 @ 05:17) (100% - 100%)  Wt(kg): --  CAPILLARY BLOOD GLUCOSE      POCT Blood Glucose.: 115 mg/dL (05 Aug 2020 22:04)      Labs                          10.6   10.13 )-----------( 406      ( 05 Aug 2020 10:35 )             32.7       08-05    139  |  103  |  6<L>  ----------------------------<  91  4.1   |  30  |  2.53<H>    Ca    8.3<L>      05 Aug 2020 22:42  Phos  3.9     08-05  Mg     2.0     08-05    TPro  6.7  /  Alb  1.8<L>  /  TBili  0.5  /  DBili  x   /  AST  23  /  ALT  17  /  AlkPhos  241<H>  08-05            .Blood Blood-Peripheral  08-02 @ 11:14   No growth to date.  --  --      .Blood Blood-Peripheral  07-31 @ 08:06   Growth in aerobic and anaerobic bottles: Staphylococcus epidermidis  "Susceptibilities not performed"  --    Growth in aerobic bottle: Gram Positive Cocci in Clusters  Growth in anaerobic bottle: Gram Positive Cocci in Clusters      .Surgical Swab left arm av graft C&S  07-31 @ 06:41   Normal skin albin isolated  --  --      .Urine Clean Catch (Midstream)  07-30 @ 01:34   >=3 organisms. Probable collection contamination.  --  --      .Blood Blood-Peripheral  07-29 @ 23:02   Growth in aerobic and anaerobic bottles: Staphylococcus epidermidis  "Due to technical problems, Proteus sp. will Not be reported as part of  the BCID panel until further notice"  ***Blood Panel PCR results on this specimen are available  approximately 3 hours after the Gram stain result.***  Gram stain, PCR, and/or culture results may not always  correspond due to difference in methodologies.  ************************************************************  This PCR assay was performed using [employer].  The following targets are tested for: Enterococcus,  vancomycin resistant enterococci, Listeria monocytogenes,  coagulase negative staphylococci, S. aureus,  methicillin resistant S. aureus, Streptococcus agalactiae  (Group B), S. pneumoniae, S. pyogenes (Group A),  Acinetobacter baumannii, Enterobacter cloacae, E. coli,  Klebsiella oxytoca, K. pneumoniae, Proteus sp.,  Serratia marcescens, Haemophilus influenzae,  Neisseria meningitidis, Pseudomonas aeruginosa, Candida  albicans, C. glabrata, C krusei, C parapsilosis,  C. tropicalis and the KPC resistance gene.  --  Blood Culture PCR          Radiology Results      Meds    MEDICATIONS  (STANDING):  chlorhexidine 2% Cloths 1 Application(s) Topical <User Schedule>  chlorhexidine 2% Cloths 1 Application(s) Topical daily  dextrose 5% + sodium chloride 0.9%. 1000 milliLiter(s) (50 mL/Hr) IV Continuous <Continuous>  epoetin joey-epbx (RETACRIT) Injectable 4000 Unit(s) IV Push <User Schedule>  metoprolol tartrate 12.5 milliGRAM(s) Oral two times a day      MEDICATIONS  (PRN):  acetaminophen   Tablet .. 650 milliGRAM(s) Oral every 6 hours PRN Mild Pain (1 - 3)  HYDROmorphone  Injectable 0.5 milliGRAM(s) IV Push every 6 hours PRN Moderate Pain (4 - 6)      Physical Exam    Neuro :  no focal deficits  Respiratory: CTA B/L  CV: RRR, S1S2, no murmurs,   Abdominal: Soft, NT, ND +BS,  Extremities: No edema, + peripheral pulses, left ue dressing cdi, distal forearm and hand edema,      ASSESSMENT      Infected prosthetic vascular graft LUE  Pseudoaneurysm of LUE arteriovenous graft  Cellulitis of left upper extremity    uti   a/p acute blood loss anemia  h/o ESRD on hd,   DM (diabetes mellitus)  Vitamin D deficiency  Hyperphosphatemia  Hypertension  S/P bunionectomy  hemorrhoidectomy  oral cancer  left AVF (arteriovenous fistula)      PLAN      s/p Excision, infected graft, extremity and Ligation of arteriovenous fistula or access graft of upper extremity 7/30/20  vasc surg f/u    cont pain control  cont wound care  gi/dvt prophylaxis  zosyn d/c'd,   id f/u   Dose Vancomycin based on trough daily level   cont IV Vancomycin 4 to 6 weeks , during dialysis   Wound care as per Vascular team  blood cx with  Staphylococcus epidermidis noted above  rept blood cx  8/2 neg noted above  Surgical Swab left arm av graft C&S with Normal skin albin isolated noted above   echo with Normal mitral valve. Normal trileaflet aortic valve. Mild aortic regurgitation.  Hyperdynamic left ventricular systolic function (EF >70%). Grade I diastolic dysfunction noted above  cardio f/u   D/W Pt risk and benefit of ELIZABETH, she declines and will treat 4-6 of ABX  cta neck with No cervical fluid collection. If malignancy is a strong clinical concern, PET CT exam recommended for further evaluation. Multiple pulmonary nodules as described above. CT chest recommended noted above  ct abd pelv with No retroperitoneal hematoma. Large amount of stool within the rectum.   cont senna 2 tabs qhs  cont miralax daily   malfunctioning shiley s/p shiley exchange at bedside 8/3/20  s/p d/c right groin shiley  s/p right permacath placement by ir  renal f/u   pt s/p hd yesterday  Epogen 4000 TIW  pt off pressors  hgba1c 5.4   endo f/u   fasting , prandial controlled  serum glucose tightly controlled  low dose sliding scale stopped  reassess based on requirements  goal inpatient glucose range: 140-180mg/d   swallow eval noted and rec Puree solids with Nectar Thick liquids.   phys tx eval noted and rec kathie  cont current meds   d/c plan for kathie Patient is a 69y old  Female who presents with a chief complaint of Painful swelling at AV fistula. (05 Aug 2020 18:00)    pt seen in tele [ x ], reg med floor [   ], bed [ x ], chair at bedside [   ], a+o x3 [ x ], lethargic [  ],    nad [ x ], right chest permacath [x]      Allergies    sulfADIAZINE (Angioedema)        Vitals    T(F): 98 (08-06-20 @ 05:17), Max: 98.8 (08-05-20 @ 11:02)  HR: 76 (08-06-20 @ 05:17) (64 - 87)  BP: 119/74 (08-06-20 @ 05:17) (119/74 - 173/95)  RR: 18 (08-06-20 @ 05:17) (18 - 18)  SpO2: 100% (08-06-20 @ 05:17) (100% - 100%)  Wt(kg): --  CAPILLARY BLOOD GLUCOSE      POCT Blood Glucose.: 115 mg/dL (05 Aug 2020 22:04)      Labs                          10.6   10.13 )-----------( 406      ( 05 Aug 2020 10:35 )             32.7       08-05    139  |  103  |  6<L>  ----------------------------<  91  4.1   |  30  |  2.53<H>    Ca    8.3<L>      05 Aug 2020 22:42  Phos  3.9     08-05  Mg     2.0     08-05    TPro  6.7  /  Alb  1.8<L>  /  TBili  0.5  /  DBili  x   /  AST  23  /  ALT  17  /  AlkPhos  241<H>  08-05            .Blood Blood-Peripheral  08-02 @ 11:14   No growth to date.  --  --      .Blood Blood-Peripheral  07-31 @ 08:06   Growth in aerobic and anaerobic bottles: Staphylococcus epidermidis  "Susceptibilities not performed"  --    Growth in aerobic bottle: Gram Positive Cocci in Clusters  Growth in anaerobic bottle: Gram Positive Cocci in Clusters      .Surgical Swab left arm av graft C&S  07-31 @ 06:41   Normal skin albin isolated  --  --      .Urine Clean Catch (Midstream)  07-30 @ 01:34   >=3 organisms. Probable collection contamination.  --  --      .Blood Blood-Peripheral  07-29 @ 23:02   Growth in aerobic and anaerobic bottles: Staphylococcus epidermidis  "Due to technical problems, Proteus sp. will Not be reported as part of  the BCID panel until further notice"  ***Blood Panel PCR results on this specimen are available  approximately 3 hours after the Gram stain result.***  Gram stain, PCR, and/or culture results may not always  correspond due to difference in methodologies.  ************************************************************  This PCR assay was performed using [employer].  The following targets are tested for: Enterococcus,  vancomycin resistant enterococci, Listeria monocytogenes,  coagulase negative staphylococci, S. aureus,  methicillin resistant S. aureus, Streptococcus agalactiae  (Group B), S. pneumoniae, S. pyogenes (Group A),  Acinetobacter baumannii, Enterobacter cloacae, E. coli,  Klebsiella oxytoca, K. pneumoniae, Proteus sp.,  Serratia marcescens, Haemophilus influenzae,  Neisseria meningitidis, Pseudomonas aeruginosa, Candida  albicans, C. glabrata, C krusei, C parapsilosis,  C. tropicalis and the KPC resistance gene.  --  Blood Culture PCR          Radiology Results      Meds    MEDICATIONS  (STANDING):  chlorhexidine 2% Cloths 1 Application(s) Topical <User Schedule>  chlorhexidine 2% Cloths 1 Application(s) Topical daily  dextrose 5% + sodium chloride 0.9%. 1000 milliLiter(s) (50 mL/Hr) IV Continuous <Continuous>  epoetin joey-epbx (RETACRIT) Injectable 4000 Unit(s) IV Push <User Schedule>  metoprolol tartrate 12.5 milliGRAM(s) Oral two times a day      MEDICATIONS  (PRN):  acetaminophen   Tablet .. 650 milliGRAM(s) Oral every 6 hours PRN Mild Pain (1 - 3)  HYDROmorphone  Injectable 0.5 milliGRAM(s) IV Push every 6 hours PRN Moderate Pain (4 - 6)      Physical Exam    Neuro :  no focal deficits  Respiratory: CTA B/L  CV: RRR, S1S2, no murmurs,   Abdominal: Soft, NT, ND +BS,  Extremities: No edema, + peripheral pulses, left ue dressing cdi, distal forearm and hand edema,      ASSESSMENT      Infected prosthetic vascular graft LUE  Pseudoaneurysm of LUE arteriovenous graft  Cellulitis of left upper extremity    uti   a/p acute blood loss anemia  h/o ESRD on hd,   DM (diabetes mellitus)  Vitamin D deficiency  Hyperphosphatemia  Hypertension  S/P bunionectomy  hemorrhoidectomy  oral cancer  left AVF (arteriovenous fistula)      PLAN      s/p Excision, infected graft, extremity and Ligation of arteriovenous fistula or access graft of upper extremity 7/30/20  vasc surg f/u    cont pain control  cont wound care  gi/dvt prophylaxis  zosyn d/c'd,   id f/u   Dose Vancomycin based on trough daily level   cont IV Vancomycin 4 to 6 weeks , during dialysis   Wound care as per Vascular team  blood cx with  Staphylococcus epidermidis noted above  rept blood cx  8/2 neg noted above  Surgical Swab left arm av graft C&S with Normal skin albin isolated noted above   echo with Normal mitral valve. Normal trileaflet aortic valve. Mild aortic regurgitation.  Hyperdynamic left ventricular systolic function (EF >70%). Grade I diastolic dysfunction noted above  cardio f/u   D/W Pt risk and benefit of ELIZABETH, she declines and will treat 4-6 of ABX  cta neck with No cervical fluid collection. If malignancy is a strong clinical concern, PET CT exam recommended for further evaluation. Multiple pulmonary nodules as described above. CT chest recommended noted above  f/u ct chest  pulm cons  ct abd pelv with No retroperitoneal hematoma. Large amount of stool within the rectum.   cont senna 2 tabs qhs  cont miralax daily   malfunctioning shiley s/p shiley exchange at bedside 8/3/20  s/p d/c right groin shiley  s/p right permacath placement by ir  renal f/u   pt s/p hd yesterday  Epogen 4000 TIW  pt off pressors  hgba1c 5.4   endo f/u   fasting , prandial controlled  serum glucose tightly controlled  low dose sliding scale stopped  reassess based on requirements  goal inpatient glucose range: 140-180mg/d   swallow eval noted and rec Puree solids with Nectar Thick liquids.   phys tx eval noted and rec kathie   palliative eval  cont current meds   d/c plan for kathie pend ct chest

## 2020-08-06 NOTE — CONSULT NOTE ADULT - PROBLEM SELECTOR RECOMMENDATION 9
No pain at present.  Ordered for dilaudid 0.5 mg IV q 6 hrs PRN; one use of PRN dilaudid 0.5 mg IV in last 24 hrs. Per RN, patient can experience mild pain with dressing changes No pain at present.  Ordered for dilaudid 0.5 mg IV q 6 hrs PRN; one use of PRN dilaudid 0.5 mg IV in last 24 hrs. Per RN, patient can experience mild pain with dressing changes.

## 2020-08-06 NOTE — PROGRESS NOTE ADULT - ASSESSMENT
ESRD  Post ligation of AVF left , Right IJ placed yesterday.  Poor po intake.  Continue dialysis    Anemia on Epogen  Follow Vancomycin T.

## 2020-08-06 NOTE — CONSULT NOTE ADULT - PROBLEM SELECTOR RECOMMENDATION 2
Albumin: 1.8.  Followed by dietary.  On Nepro TID.  Encourage small, frequent meals.  Healing may of LUE may be impaired by albumin

## 2020-08-06 NOTE — PROGRESS NOTE ADULT - SUBJECTIVE AND OBJECTIVE BOX
INTERVAL HPI/OVERNIGHT EVENTS:  No acute events overnight. Pt resting comfortably. No acute complaints.  s/p Rt IJ shiley placement 8/5.    MEDICATIONS  (STANDING):  chlorhexidine 2% Cloths 1 Application(s) Topical <User Schedule>  chlorhexidine 2% Cloths 1 Application(s) Topical daily  dextrose 5% + sodium chloride 0.9%. 1000 milliLiter(s) (50 mL/Hr) IV Continuous <Continuous>  epoetin joey-epbx (RETACRIT) Injectable 4000 Unit(s) IV Push <User Schedule>  metoprolol tartrate 12.5 milliGRAM(s) Oral two times a day  mupirocin 2% Nasal 1 Application(s) Nasal every 12 hours    MEDICATIONS  (PRN):  acetaminophen   Tablet .. 650 milliGRAM(s) Oral every 6 hours PRN Mild Pain (1 - 3)  HYDROmorphone  Injectable 0.5 milliGRAM(s) IV Push every 6 hours PRN Moderate Pain (4 - 6)      Vital Signs Last 24 Hrs  T(C): 36.7 (06 Aug 2020 05:17), Max: 37.1 (05 Aug 2020 11:02)  T(F): 98 (06 Aug 2020 05:17), Max: 98.8 (05 Aug 2020 11:02)  HR: 76 (06 Aug 2020 05:17) (64 - 87)  BP: 119/74 (06 Aug 2020 05:17) (119/74 - 173/95)  RR: 18 (06 Aug 2020 05:17) (18 - 18)  SpO2: 100% (06 Aug 2020 05:17) (100% - 100%)    Physical:  General: Alert and oriented, not in acute distress  Respiratory: Breathing unlabored  Extremities: anteromedial left upper extremity wound with no active bleeding or drainage; clot removed from upper incision; upper axillary pole wound packed with WTD; remaining open wounds dressed with xeroform, dry gauze and kerlex and ace wrap. 2+ radial and ulnar pulses palpable; right groin shiley removed 8/5, dressing c/d/i, groin soft, no palpable hematoma     LABS:                      9.4    8.99  )-----------( 344      ( 06 Aug 2020 07:10 )             29.3             08-06    142  |  103  |  7   ----------------------------<  81  3.9   |  29  |  3.03<H>    Ca    8.3<L>      06 Aug 2020 07:10  Phos  3.9     08-05  Mg     2.0     08-05    TPro  6.7  /  Alb  1.8<L>  /  TBili  0.5  /  DBili  x   /  AST  23  /  ALT  17  /  AlkPhos  241<H>  08-05 INTERVAL HPI/OVERNIGHT EVENTS:  No acute events overnight. Pt resting comfortably. No acute complaints. s/p Rt tunneled IJ catheter placed 8/5 by IR.    MEDICATIONS  (STANDING):  chlorhexidine 2% Cloths 1 Application(s) Topical <User Schedule>  chlorhexidine 2% Cloths 1 Application(s) Topical daily  dextrose 5% + sodium chloride 0.9%. 1000 milliLiter(s) (50 mL/Hr) IV Continuous <Continuous>  epoetin joey-epbx (RETACRIT) Injectable 4000 Unit(s) IV Push <User Schedule>  metoprolol tartrate 12.5 milliGRAM(s) Oral two times a day  mupirocin 2% Nasal 1 Application(s) Nasal every 12 hours    MEDICATIONS  (PRN):  acetaminophen   Tablet .. 650 milliGRAM(s) Oral every 6 hours PRN Mild Pain (1 - 3)  HYDROmorphone  Injectable 0.5 milliGRAM(s) IV Push every 6 hours PRN Moderate Pain (4 - 6)      Vital Signs Last 24 Hrs  T(C): 36.7 (06 Aug 2020 05:17), Max: 37.1 (05 Aug 2020 11:02)  T(F): 98 (06 Aug 2020 05:17), Max: 98.8 (05 Aug 2020 11:02)  HR: 76 (06 Aug 2020 05:17) (64 - 87)  BP: 119/74 (06 Aug 2020 05:17) (119/74 - 173/95)  RR: 18 (06 Aug 2020 05:17) (18 - 18)  SpO2: 100% (06 Aug 2020 05:17) (100% - 100%)    Physical:  General: Alert and oriented, not in acute distress  Respiratory: Breathing unlabored  Chest: right chest tunneled catheter in place  Extremities: anteromedial left upper extremity wound with no active bleeding or drainage; clot removed from upper incision; upper axillary pole wound packed with WTD; remaining open wounds dressed with xeroform, dry gauze and kerlex and ace wrap. 2+ radial and ulnar pulses palpable; right groin shiley removed 8/5, dressing c/d/i, groin soft, no palpable hematoma     LABS:                      9.4    8.99  )-----------( 344      ( 06 Aug 2020 07:10 )             29.3             08-06    142  |  103  |  7   ----------------------------<  81  3.9   |  29  |  3.03<H>    Ca    8.3<L>      06 Aug 2020 07:10  Phos  3.9     08-05  Mg     2.0     08-05    TPro  6.7  /  Alb  1.8<L>  /  TBili  0.5  /  DBili  x   /  AST  23  /  ALT  17  /  AlkPhos  241<H>  08-05

## 2020-08-06 NOTE — CONSULT NOTE ADULT - SUBJECTIVE AND OBJECTIVE BOX
69F from [facility], with PMH of HTN, ESRD, DM, Hyperphosphatemia,  Oral cancer(s/p excision) , comes to the ED  with left arm AV fistula pain and swelling x1 week. Pt was in her usual state of health until last week when she went for her HD session and developed gradual onset of swelling and pain at the AVF site on L arm. Pt s/p removal of left AV graft due to infection and pseudoaneurysm. Pt with an open wound where her fistula site was previously. I was consulted to evaluate the wound.    REVIEW OF SYSTEMS: Total of twelve systems have been reviewed with patient and found to be negative unless mentioned in HPI        PAST MEDICAL & SURGICAL HISTORY:  DM (diabetes mellitus)  Vitamin D deficiency  ESRD (end stage renal disease)  Hyperphosphatemia  Hypertension  S/P bunionectomy: bilateral great toes  H/O hemorrhoidectomy  H/O oral cancer  AVF (arteriovenous fistula): x3        SOCIAL HISTORY  Alcohol: Does not drink  Tobacco: Does not smoke  Illicit substance use: None      FAMILY HISTORY: Non contributory to the present illness        ALLERGIES: sulfADIAZINE (Angioedema)      Exam:   left upper arm with open linear wound along the anterior portion of her upper arm. Pt with area of skin loss in distal upper arm. No exposed vessels. Subcutaneous tissue present, Wound measures approximately 3x 5cm in size. Pt with significant edema in her entire upper extremity. Pt with some weeping from the wound which was serous in nature and likely from the arm edema. No signs of infection. Area of demarcation along skin in distal upper arm.                                 9.4    8.99  )-----------( 344      ( 06 Aug 2020 07:10 )             29.3   08-06    142  |  103  |  7   ----------------------------<  81  3.9   |  29  |  3.03<H>    Ca    8.3<L>      06 Aug 2020 07:10  Phos  3.9     08-05  Mg     2.0     08-05    TPro  6.7  /  Alb  1.8<L>  /  TBili  0.5  /  DBili  x   /  AST  23  /  ALT  17  /  AlkPhos  241<H>  08-05

## 2020-08-06 NOTE — PROGRESS NOTE ADULT - PROBLEM SELECTOR PLAN 5
# Hx of DM on hss at home   - Target CBG >140 and < 180  - A1C 5.4  - FS achs # Hx of DM on hss at home   - Target CBG >140 and < 180  - A1C 5.4  - FS achs  - Not on the insulin sliding scale

## 2020-08-06 NOTE — PROGRESS NOTE ADULT - ASSESSMENT
Patient is a 69y old  Female from [facility], with PMH of HTN, DM, Hyperphosphatemia,  Oral cancer(s/p excision), ESRD on HD via AVF  which created 3-4 years ago s/p exploration of AVF, interposition graft 6/2017, send in  to the ER for evaluation of  left arm AV fistula pain and swelling x1 week. Last week when she went for her HD session and developed gradual onset of swelling and pain at the AVF site on L arm. As per the patient the HD nurse had inserted the needle incorrectly that caused the swelling and severe pain. The AVF was not occluded and she had her HD sessions as per the schedule Tu/Th/Sat with her last HD session being yesterday.  On admission, she has no fever or Leukocytosis, but admission blood cultures growing GPC in clusters. She has started on Zosyn and Vancomycin and the ID consult requested to assist with further evaluation and antibiotic management.      # Bacteremia- 7/29/20- Coagulase Negative Staph. - ? source AVG - Repeat Blood Cx from 7/31 still positive- TTE as of 7/31 is negative for vegetation - Repeat BCx as of 8/2  NGTD  # Infected AVG with surrounding cellulitis - s/p ligation of Left  AVG pseudoaneurysm 7/30  # Septic shock- Post-op 7/30- transferred to ICU since requiring pressor    would recommend:    1. Dose of Vancomycin since level is less  than 30  2. Wound care as per Vascular team  3. Need IV Vancomycin 4 weeks , during dialysis, until 8/30/20  4. Continue bowel regimen as needed    d/w Dr. Knight and Patient     Attending Attestation:    Spent more than 40 minutes on total encounter, more than 50 % of the visit was spent counseling and/or coordinating care by the Attending physician.

## 2020-08-06 NOTE — CONSULT NOTE ADULT - SURROGATE NAME
Jina Arroyo; daughter in [place].  States that she makes decisions with her sister, TARSHA Soliz, does not have her number.  States that her sister, CHELO Vargas, with whom she resides, does not have her number either.  CHELO Vargas (sister with whom she resides) Jina Arroyo; daughter in [facility].  States that she makes decisions with her sister, TARSHA Soliz, does not have her number.  States that her sister, CHELO Vargas, with whom she resides, does not have her number either.

## 2020-08-06 NOTE — CONSULT NOTE ADULT - CONSULT REASON
AVF site cellulitis
ESRD
L UE graft
Post op monitoring
Pulmonary Nodules
evaluation of left upper arm open wound
glycemic management
goals of care
Hypotension

## 2020-08-06 NOTE — CONSULT NOTE ADULT - PROVIDER SPECIALTY LIST ADULT
Cardiology
Endocrinology
Vascular Surgery
Critical Care
Infectious Disease
Plastic Surgery
Pulmonology
Nephrology
Palliative Care

## 2020-08-06 NOTE — CONSULT NOTE ADULT - PROBLEM SELECTOR RECOMMENDATION 6
Endorses weakness, fatigue.  Supportive care.  Continue with PT.  GERARD recommended for d/c.  Patient expressed openness to this.  D/W ETELVINA, VENESSA.  CM to f/u with patient and family Endorses weakness, fatigue.  Supportive care.  Continue with PT.  GERARD recommended for d/c.  Patient expressed openness to this.  D/W SW, Care Coordinator.  Care coordinator to f/u with patient and family Endorses weakness, fatigue.  Supportive care.  PT has seen patient; GERARD recommended for d/c.  Patient expressed openness to this.  D/W SW, Care Coordinator.  Care coordinator to f/u with patient and family

## 2020-08-06 NOTE — PROGRESS NOTE ADULT - ASSESSMENT
70 yo F with PMHx of ESRD on HD, HTN, DM presented to the ED with L Arm pain, found to have pseudoaneurysm and AVG infection coag neg staph. Patient was taken to OR 7/30/20 for AVG excision and ligation. Pt was brought to ICU for septic shock and post op monitoring. Now she is stable. Today we will be following up for his augusto/ permacath placement and her hemodialysis. 68 yo F with PMHx of ESRD on HD, HTN, DM presented to the ED with L Arm pain, found to have pseudoaneurysm and AVG infection coag neg staph. Patient was taken to OR 7/30/20 for AVG excision and ligation. Pt was brought to ICU for septic shock and post op monitoring. Now she is stable. Yesterday augusto catheter placement done by IR and hemodialysis was done. Vanc trough was elevated yesterday. Creat trending up. Palliative consult recommended for a CT Chest no contrast due to lung nodules on CT neck. 70 yo F with PMHx of ESRD on HD, HTN, DM presented to the ED with L Arm pain, found to have pseudoaneurysm and AVG infection coag neg staph. Patient was taken to OR 7/30/20 for AVG excision and ligation. Pt was brought to ICU for septic shock and post op monitoring. Now she is stable. Yesterday dialysis catheter placement done by IR and hemodialysis was done. Vanc trough was elevated yesterday. Creat trending up. Palliative consult recommended for a CT Chest no contrast due to lung nodules on CT neck.

## 2020-08-06 NOTE — CONSULT NOTE ADULT - PROBLEM SELECTOR RECOMMENDATION 3
Excision of graft 7/30/20 LUE cellulitis w/AVG infected.  S/P excision of graft.  Followed by ID, surgery.

## 2020-08-06 NOTE — PROGRESS NOTE ADULT - PROBLEM SELECTOR PLAN 3
- Has right femoral Shiley, was not changed by vascular surgery   - Monitor electrolytes    - F/u Dr. Mark Romero/ Permcath placement by IR today  - HD after cath placement  - Vanco trough is high, so will not give a Vanco dose. - Has right femoral Shiley, was not changed by vascular surgery   - Monitor electrolytes    - F/u Dr. Mark Romero catheter placement by IR done yesterday  - HD done after cath placement  - Vanco trough was high 29.8. Will do Vanc trough again today after HD.  - Creat trending up from 2.53 to 3.03. Will monitor it. - Has right femoral Shiley, was not changed by vascular surgery   - Monitor electrolytes    - F/u Dr. Flores  - Dialysis catheter placement by IR done yesterday  - HD done after cath placement  - Vanco trough was high 29.8. Will do Vanc trough again today after HD.  - Creat trending up from 2.53 to 3.03. Will monitor it. - Has right femoral Shiley, was not changed by vascular surgery   - Monitor electrolytes    - F/u Dr. Flores  - Dialysis catheter placement by IR done yesterday  - HD done after cath placement  - Vanco trough was high 29.8. Patient refused Vanco trough today  - Creat trending up from 2.53 to 3.03. Will monitor it.

## 2020-08-06 NOTE — PROGRESS NOTE ADULT - SUBJECTIVE AND OBJECTIVE BOX
Interval Events:    tolerating po intake  poc glucose stable  no further hypoglycemic events    Allergies    sulfADIAZINE (Angioedema)    Intolerances      Endocrine/Metabolic Medications:      Vital Signs Last 24 Hrs  T(C): 36.7 (06 Aug 2020 05:17), Max: 37.1 (05 Aug 2020 11:02)  T(F): 98 (06 Aug 2020 05:17), Max: 98.8 (05 Aug 2020 11:02)  HR: 76 (06 Aug 2020 05:17) (64 - 87)  BP: 119/74 (06 Aug 2020 05:17) (119/74 - 173/95)  BP(mean): --  RR: 18 (06 Aug 2020 05:17) (18 - 18)  SpO2: 100% (06 Aug 2020 05:17) (100% - 100%)      PHYSICAL EXAM  Constitutional:    NC/AT:    HEENT:    Neck:  No JVD  Respiratory:  reduced breath sounds b/l bases    Cardiovascular:  RR   Gastrointestinal: Soft     Extremities: without cyanosis      Neurological:  non focal    LABS                        9.4    8.99  )-----------( 344      ( 06 Aug 2020 07:10 )             29.3                               142    |  103    |  7                   Calcium: 8.3   / iCa: x      (08-06 @ 07:10)    ----------------------------<  81        Magnesium: x                                3.9     |  29     |  3.03             Phosphorous: x        TPro  6.7    /  Alb  1.8    /  TBili  0.5    /  DBili  x      /  AST  23     /  ALT  17     /  AlkPhos  241    05 Aug 2020 10:35    CAPILLARY BLOOD GLUCOSE      POCT Blood Glucose.: 96 mg/dL (06 Aug 2020 08:11)  POCT Blood Glucose.: 115 mg/dL (05 Aug 2020 22:04)  POCT Blood Glucose.: 87 mg/dL (05 Aug 2020 16:01)  POCT Blood Glucose.: 131 mg/dL (05 Aug 2020 11:47)        Assesment/plan        68yo female with multiple comorbidities including h/o HTN, DM type 2, ESRD on HD admitted with AV fistula swelling and pain    Endocrinology consulted for glycemic management    DM type 2  complicated by ESRD on HD, sepsis- bacteremia, requiring pressor current admit  NH regimen:  humalog sliding scale    recommendations:  fasting , prandial tightly controlled off sliding scale  tolerating dysphagia diet  no further hypoglycemia    - continue off sliding scale  can restart low dose sliding scale if glucose poc >180mg/dl  - reassess based on requirements  - goal inpatient glucose range: 140-180mg/dl    sepsis  bacteremia- staph epidermidis  on vancomycin  s/p AV repair/ligation  for possible permacath placement  ID on board  on zosyn  required pressor    ESRD on HD  cautious insulin dosing  complicated by hypoglycemia on low dose sliding scale  off insulin    Discussed with primary team  Please call - Endocrine- Dr Mónica Cm as needed

## 2020-08-06 NOTE — PROGRESS NOTE ADULT - ASSESSMENT
69F s/p ligation of infected L AVG pseudoaneurysm 7/30, s/p IR rt IJ shiley 8/5, s/p removal of rt groin shiley    - daily LUE dressing changes with WTD in axillary pole, xeroform, dry gauze and kerlex  - continue elevation of LUE  - cont IV abx as per ID (IV Vancomycin 4 to 6 weeks, during dialysis)  - HD via rt IJ as per nephrology 69F s/p ligation of infected L AVG pseudoaneurysm 7/30, s/p IR rt IJ tunneled catheter 8/5, s/p removal of rt groin shiley    - daily LUE dressing changes with WTD in axillary pole, xeroform, dry gauze and kerlex  - continue elevation of LUE  - cont IV abx as per ID (IV Vancomycin 4 to 6 weeks, during dialysis)  - HD via rt IJ as per nephrology

## 2020-08-06 NOTE — PROGRESS NOTE ADULT - SUBJECTIVE AND OBJECTIVE BOX
PGY-1 Progress Note discussed with attending    PAGER #: [[phone]] TILL 5:00 PM  PLEASE CONTACT ON CALL TEAM:  - On Call Team (Please refer to Lamar) FROM 5:00 PM - 8:30PM  - Nightfloat Team FROM 8:30 -7:30 AM    CHIEF COMPLAINT & BRIEF HOSPITAL COURSE: 68 yo F with PMHx of ESRD on HD, HTN, DM presented to the ED with L Arm pain, found to have pseudoaneurysm and AVG infection coag neg staph. Patient was taken to OR 7/30/20 for AVG excision and ligation. Admitted her for septic shock to ICU which is now resolved. Patient was transferred to the floor. Yesterday the [facility]    INTERVAL HPI/OVERNIGHT EVENTS: No       REVIEW OF SYSTEMS:  CONSTITUTIONAL: No fever, weight loss, or fatigue  RESPIRATORY: No cough, wheezing, chills or hemoptysis; No shortness of breath  CARDIOVASCULAR: No chest pain, palpitations, dizziness, or leg swelling  GASTROINTESTINAL: No abdominal pain. No nausea, vomiting, or hematemesis; No diarrhea or constipation. No melena or hematochezia.  GENITOURINARY: No dysuria or hematuria, urinary frequency  NEUROLOGICAL: No headaches, memory loss, loss of strength, numbness, or tremors  SKIN: No itching, burning, rashes, or lesions     Vital Signs Last 24 Hrs  T(C): 36.7 (06 Aug 2020 05:17), Max: 36.8 (05 Aug 2020 18:30)  T(F): 98 (06 Aug 2020 05:17), Max: 98.3 (05 Aug 2020 18:30)  HR: 74 (06 Aug 2020 11:05) (64 - 87)  BP: 141/91 (06 Aug 2020 11:05) (119/74 - 173/95)  BP(mean): --  RR: 18 (06 Aug 2020 05:17) (18 - 18)  SpO2: 97% (06 Aug 2020 11:05) (97% - 100%)    PHYSICAL EXAMINATION:  GENERAL: NAD, well built  HEAD:  Atraumatic, Normocephalic  EYES:  conjunctiva and sclera clear  NECK: Supple, No JVD, Normal thyroid  CHEST/LUNG: Clear to auscultation. Clear to percussion bilaterally; No rales, rhonchi, wheezing, or rubs  HEART: Regular rate and rhythm; No murmurs, rubs, or gallops  ABDOMEN: Soft, Nontender, Nondistended; Bowel sounds present  NERVOUS SYSTEM:  Alert & Oriented X3,    EXTREMITIES:  2+ Peripheral Pulses, No clubbing, cyanosis, or edema  SKIN: warm dry                          9.4    8.99  )-----------( 344      ( 06 Aug 2020 07:10 )             29.3     08-06    142  |  103  |  7   ----------------------------<  81  3.9   |  29  |  3.03<H>    Ca    8.3<L>      06 Aug 2020 07:10  Phos  3.9     08-05  Mg     2.0     08-05    TPro  6.7  /  Alb  1.8<L>  /  TBili  0.5  /  DBili  x   /  AST  23  /  ALT  17  /  AlkPhos  241<H>  08-05    LIVER FUNCTIONS - ( 05 Aug 2020 10:35 )  Alb: 1.8 g/dL / Pro: 6.7 g/dL / ALK PHOS: 241 U/L / ALT: 17 U/L DA / AST: 23 U/L / GGT: x               PT/INR - ( 05 Aug 2020 10:35 )   PT: 16.0 sec;   INR: 1.39 ratio             CAPILLARY BLOOD GLUCOSE      RADIOLOGY & ADDITIONAL TESTS: PGY-1 Progress Note discussed with attending    PAGER #: [[phone]] TILL 5:00 PM  PLEASE CONTACT ON CALL TEAM:  - On Call Team (Please refer to Lamar) FROM 5:00 PM - 8:30PM  - Nightfloat Team FROM 8:30 -7:30 AM    CHIEF COMPLAINT & BRIEF HOSPITAL COURSE: 68 yo F with PMHx of ESRD on HD, HTN, DM presented to the ED with L Arm pain, found to have pseudoaneurysm and AVG infection coag neg staph. Patient was taken to OR 7/30/20 for AVG excision and ligation. Admitted her for septic shock to ICU which is now resolved. Patient was transferred to the floor. Yesterday the augusto catheter was placed by IR. Hemodialysis was done yesterday. Will follow up with a Vanc trough.    INTERVAL HPI/OVERNIGHT EVENTS: No events overnight.      REVIEW OF SYSTEMS:  CONSTITUTIONAL: No fever, weight loss, or fatigue  RESPIRATORY: No cough, wheezing, chills or hemoptysis; No shortness of breath  CARDIOVASCULAR: No chest pain, palpitations, dizziness, or leg swelling  GASTROINTESTINAL: No abdominal pain. No nausea, vomiting, or hematemesis; No diarrhea or constipation. No melena or hematochezia.  GENITOURINARY: No dysuria or hematuria, urinary frequency  NEUROLOGICAL: No headaches, memory loss, loss of strength, numbness, or tremors  SKIN: No itching, burning, rashes, or lesions     Vital Signs Last 24 Hrs  T(C): 36.7 (06 Aug 2020 05:17), Max: 36.8 (05 Aug 2020 18:30)  T(F): 98 (06 Aug 2020 05:17), Max: 98.3 (05 Aug 2020 18:30)  HR: 74 (06 Aug 2020 11:05) (64 - 87)  BP: 141/91 (06 Aug 2020 11:05) (119/74 - 173/95)  BP(mean): --  RR: 18 (06 Aug 2020 05:17) (18 - 18)  SpO2: 97% (06 Aug 2020 11:05) (97% - 100%)    PHYSICAL EXAMINATION:  GENERAL: NAD, well built  HEAD:  Atraumatic, Normocephalic  EYES:  conjunctiva and sclera clear  NECK: Supple, No JVD, Normal thyroid  CHEST/LUNG: Clear to auscultation. Clear to percussion bilaterally; No rales, rhonchi, wheezing, or rubs  HEART: Regular rate and rhythm; No murmurs, rubs, or gallops  ABDOMEN: Soft, Nontender, Nondistended; Bowel sounds present  NERVOUS SYSTEM:  Alert & Oriented X3,    EXTREMITIES:  2+ Peripheral Pulses, No clubbing, cyanosis, or edema  SKIN: warm dry                          9.4    8.99  )-----------( 344      ( 06 Aug 2020 07:10 )             29.3     08-06    142  |  103  |  7   ----------------------------<  81  3.9   |  29  |  3.03<H>    Ca    8.3<L>      06 Aug 2020 07:10  Phos  3.9     08-05  Mg     2.0     08-05    TPro  6.7  /  Alb  1.8<L>  /  TBili  0.5  /  DBili  x   /  AST  23  /  ALT  17  /  AlkPhos  241<H>  08-05    LIVER FUNCTIONS - ( 05 Aug 2020 10:35 )  Alb: 1.8 g/dL / Pro: 6.7 g/dL / ALK PHOS: 241 U/L / ALT: 17 U/L DA / AST: 23 U/L / GGT: x               PT/INR - ( 05 Aug 2020 10:35 )   PT: 16.0 sec;   INR: 1.39 ratio             CAPILLARY BLOOD GLUCOSE      RADIOLOGY & ADDITIONAL TESTS: PGY-1 Progress Note discussed with attending    PAGER #: [[phone]] TILL 5:00 PM  PLEASE CONTACT ON CALL TEAM:  - On Call Team (Please refer to Lamar) FROM 5:00 PM - 8:30PM  - Nightfloat Team FROM 8:30 -7:30 AM    CHIEF COMPLAINT & BRIEF HOSPITAL COURSE: 68 yo F with PMHx of ESRD on HD, HTN, DM presented to the ED with L Arm pain, found to have pseudoaneurysm and AVG infection coag neg staph. Patient was taken to OR 7/30/20 for AVG excision and ligation. Admitted her for septic shock to ICU which is now resolved. Patient was transferred to the floor. Yesterday the dialysis catheter was placed by IR. Hemodialysis was done yesterday. Will follow up with a Vanc trough.    INTERVAL HPI/OVERNIGHT EVENTS: No events overnight.      REVIEW OF SYSTEMS:  CONSTITUTIONAL: No fever, weight loss, or fatigue  RESPIRATORY: No cough, wheezing, chills or hemoptysis; No shortness of breath  CARDIOVASCULAR: No chest pain, palpitations, dizziness, or leg swelling  GASTROINTESTINAL: No abdominal pain. No nausea, vomiting, or hematemesis; No diarrhea or constipation. No melena or hematochezia.  GENITOURINARY: No dysuria or hematuria, urinary frequency  NEUROLOGICAL: No headaches, memory loss, loss of strength, numbness, or tremors  SKIN: No itching, burning, rashes, or lesions     Vital Signs Last 24 Hrs  T(C): 36.7 (06 Aug 2020 05:17), Max: 36.8 (05 Aug 2020 18:30)  T(F): 98 (06 Aug 2020 05:17), Max: 98.3 (05 Aug 2020 18:30)  HR: 74 (06 Aug 2020 11:05) (64 - 87)  BP: 141/91 (06 Aug 2020 11:05) (119/74 - 173/95)  BP(mean): --  RR: 18 (06 Aug 2020 05:17) (18 - 18)  SpO2: 97% (06 Aug 2020 11:05) (97% - 100%)    PHYSICAL EXAMINATION:  GENERAL: NAD, well built  HEAD:  Atraumatic, Normocephalic  EYES:  conjunctiva and sclera clear  NECK: Supple, No JVD, Normal thyroid  CHEST/LUNG: Clear to auscultation. Clear to percussion bilaterally; No rales, rhonchi, wheezing, or rubs  HEART: Regular rate and rhythm; No murmurs, rubs, or gallops  ABDOMEN: Soft, Nontender, Nondistended; Bowel sounds present  NERVOUS SYSTEM:  Alert & Oriented X3,    EXTREMITIES:  2+ Peripheral Pulses, No clubbing, cyanosis, or edema  SKIN: warm dry                          9.4    8.99  )-----------( 344      ( 06 Aug 2020 07:10 )             29.3     08-06    142  |  103  |  7   ----------------------------<  81  3.9   |  29  |  3.03<H>    Ca    8.3<L>      06 Aug 2020 07:10  Phos  3.9     08-05  Mg     2.0     08-05    TPro  6.7  /  Alb  1.8<L>  /  TBili  0.5  /  DBili  x   /  AST  23  /  ALT  17  /  AlkPhos  241<H>  08-05    LIVER FUNCTIONS - ( 05 Aug 2020 10:35 )  Alb: 1.8 g/dL / Pro: 6.7 g/dL / ALK PHOS: 241 U/L / ALT: 17 U/L DA / AST: 23 U/L / GGT: x               PT/INR - ( 05 Aug 2020 10:35 )   PT: 16.0 sec;   INR: 1.39 ratio             CAPILLARY BLOOD GLUCOSE      RADIOLOGY & ADDITIONAL TESTS: PGY-1 Progress Note discussed with attending    PAGER #: [[phone]] TILL 5:00 PM  PLEASE CONTACT ON CALL TEAM:  - On Call Team (Please refer to Lamar) FROM 5:00 PM - 8:30PM  - Nightfloat Team FROM 8:30 -7:30 AM    CHIEF COMPLAINT & BRIEF HOSPITAL COURSE: 70 yo F with PMHx of ESRD on HD, HTN, DM presented to the ED with L Arm pain, found to have pseudoaneurysm and AVG infection coag neg staph. Patient was taken to OR 7/30/20 for AVG excision and ligation. Admitted her for septic shock to ICU which is now resolved. Patient was transferred to the floor. Yesterday the dialysis catheter was placed by IR. Hemodialysis was done yesterday. She refused the Vanc trough today.    INTERVAL HPI/OVERNIGHT EVENTS: No events overnight.      REVIEW OF SYSTEMS:  CONSTITUTIONAL: No fever, weight loss, or fatigue  RESPIRATORY: No cough, wheezing, chills or hemoptysis; No shortness of breath  CARDIOVASCULAR: No chest pain, palpitations, dizziness, or leg swelling  GASTROINTESTINAL: No abdominal pain. No nausea, vomiting, or hematemesis; No diarrhea or constipation. No melena or hematochezia.  GENITOURINARY: No dysuria or hematuria, urinary frequency  NEUROLOGICAL: No headaches, memory loss, loss of strength, numbness, or tremors  SKIN: No itching, burning, rashes, or lesions     Vital Signs Last 24 Hrs  T(C): 36.7 (06 Aug 2020 05:17), Max: 36.8 (05 Aug 2020 18:30)  T(F): 98 (06 Aug 2020 05:17), Max: 98.3 (05 Aug 2020 18:30)  HR: 74 (06 Aug 2020 11:05) (64 - 87)  BP: 141/91 (06 Aug 2020 11:05) (119/74 - 173/95)  BP(mean): --  RR: 18 (06 Aug 2020 05:17) (18 - 18)  SpO2: 97% (06 Aug 2020 11:05) (97% - 100%)    PHYSICAL EXAMINATION:  GENERAL: NAD, well built  HEAD:  Atraumatic, Normocephalic  EYES:  conjunctiva and sclera clear  NECK: Supple, No JVD, Normal thyroid  CHEST/LUNG: Clear to auscultation. Clear to percussion bilaterally; No rales, rhonchi, wheezing, or rubs  HEART: Regular rate and rhythm; No murmurs, rubs, or gallops  ABDOMEN: Soft, Nontender, Nondistended; Bowel sounds present  NERVOUS SYSTEM:  Alert & Oriented X3,    EXTREMITIES:  2+ Peripheral Pulses, No clubbing, cyanosis, or edema  SKIN: warm dry                          9.4    8.99  )-----------( 344      ( 06 Aug 2020 07:10 )             29.3     08-06    142  |  103  |  7   ----------------------------<  81  3.9   |  29  |  3.03<H>    Ca    8.3<L>      06 Aug 2020 07:10  Phos  3.9     08-05  Mg     2.0     08-05    TPro  6.7  /  Alb  1.8<L>  /  TBili  0.5  /  DBili  x   /  AST  23  /  ALT  17  /  AlkPhos  241<H>  08-05    LIVER FUNCTIONS - ( 05 Aug 2020 10:35 )  Alb: 1.8 g/dL / Pro: 6.7 g/dL / ALK PHOS: 241 U/L / ALT: 17 U/L DA / AST: 23 U/L / GGT: x               PT/INR - ( 05 Aug 2020 10:35 )   PT: 16.0 sec;   INR: 1.39 ratio             CAPILLARY BLOOD GLUCOSE      RADIOLOGY & ADDITIONAL TESTS:

## 2020-08-06 NOTE — DISCHARGE NOTE PROVIDER - CARE PROVIDER_API CALL
Leno Dykes  INTERNAL MEDICINE  [address] [place] [address]  Phone: [phone]  Fax: [phone]  Follow Up Time:     George Knight)  Internal Medicine  [address] [place] [address]  Phone: [phone]  Fax: [phone]  Follow Up Time: Leno Dykes  INTERNAL MEDICINE  [address] [place] [address]  Phone: [phone]  Fax: [phone]  Follow Up Time:     George Knight)  Internal Medicine  [address] [place] [address]  Phone: [phone]  Fax: [phone]  Follow Up Time:     Rosalino Galeas)  Surgery  [address] [place] [address], [address]  [place] [address]  Phone: [phone]  Fax: [phone]  Follow Up Time: 2 weeks

## 2020-08-06 NOTE — PROGRESS NOTE ADULT - ASSESSMENT
69 yr old F from [facility], with PMH of HTN, ESRD, DM, Hyperphosphatemia,  Oral cancer(s/p excision) , comes to the ED  with left arm AV fistula pain and swelling x1 week,sepsis due to staph epi, s/p ligation of AVF, acute anemia.  1.S/P PC/  2.Sepsis-ABX as per ID.  3.Acute anemia-s/p  PRBC.  4.ESRD-HD as per renal.  5.DM-Insulin.  6.GI and DVT prophylaxis.

## 2020-08-06 NOTE — PROGRESS NOTE ADULT - SUBJECTIVE AND OBJECTIVE BOX
Patient is seen and examined at the bed side, is afebrile.  The WBC count stay normal. The Vancomycin level is less than  30.        REVIEW OF SYSTEMS: All other review systems are negative        ALLERGIES: sulfADIAZINE (Angioedema)        Vital Signs Last 24 Hrs  T(C): 36.7 (06 Aug 2020 05:17), Max: 36.8 (05 Aug 2020 18:30)  T(F): 98 (06 Aug 2020 05:17), Max: 98.3 (05 Aug 2020 18:30)  HR: 74 (06 Aug 2020 11:05) (64 - 87)  BP: 141/91 (06 Aug 2020 11:05) (119/74 - 173/95)  BP(mean): --  RR: 18 (06 Aug 2020 05:17) (18 - 18)  SpO2: 97% (06 Aug 2020 11:05) (97% - 100%)        PHYSICAL EXAM:  GENERAL: Not in distress   CHEST/LUNG:  Not using accessory muscles  HEART: s1 and s2 present  ABDOMEN:  Nontender and  Nondistended  EXTREMITIES: Left UE ACE bandage in placed, the swelling and tenderness is improving   CNS: Awake and Alert          LABS:                                   9.4    8.99  )-----------( 344      ( 06 Aug 2020 07:10 )             29.3                         10.6   10.13 )-----------( 406      ( 05 Aug 2020 10:35 )             32.7                8.5    14.60 )-----------( 291      ( 01 Aug 2020 06:04 )             25.8         08-06    142  |  103  |  7   ----------------------------<  81  3.9   |  29  |  3.03<H>    Ca    8.3<L>      06 Aug 2020 07:10  Phos  3.9     08-05  Mg     2.0     08-05    TPro  6.7  /  Alb  1.8<L>  /  TBili  0.5  /  DBili  x   /  AST  23  /  ALT  17  /  AlkPhos  241<H>  08-    08-05    137  |  101  |  14  ----------------------------<  79  4.2   |  28  |  4.60<H>    Ca    9.5      05 Aug 2020 10:35  Phos  3.9     08-  Mg     2.0     08-05    TPro  6.7  /  Alb  1.8<L>  /  TBili  0.5  /  DBili  x   /  AST  23  /  ALT  17  /  AlkPhos  241<H>  08-05    PT/INR - ( 2020 16:23 )   PT: 15.1 sec;   INR: 1.31 ratio      PTT - ( 2020 16:23 )  PTT:35.4 sec        CAPILLARY BLOOD GLUCOSE  POCT Blood Glucose.: 114 mg/dL (2020 18:11)  POCT Blood Glucose.: 111 mg/dL (2020 12:15)  POCT Blood Glucose.: 102 mg/dL (2020 10:21)  POCT Blood Glucose.: 77 mg/dL (2020 09:09)  POCT Blood Glucose.: 56 mg/dL (2020 09:07)        Urinalysis Basic - ( 2020 17:02 )  Color: Red / Appearance: bloody / S.015 / pH: x  Gluc: x / Ketone: Negative  / Bili: Small / Urobili: Negative   Blood: x / Protein: 100 / Nitrite: Negative   Leuk Esterase: Moderate / RBC: >50 /HPF / WBC 11-25 /HPF   Sq Epi: x / Non Sq Epi: Few /HPF / Bacteria: Moderate /HPF        Vancomycin Level, Trough (08.05.20 @ 10:36)    Vancomycin Level, Trough: 29.8: Vancomycin trough levels should be rapidly reached and maintained at  15-20 ug/ml for life threatening MRSA  infections such as sepsis, endocarditis, osteomyelitis and pneumonia.       Vancomycin Level, Trough: 36.3: Vancomycin trough levels should be rapidly reached and maintained at  15-20 ug/ml for life threatening MRSA  infections such as sepsis, endocarditis, osteomyelitis and pneumonia.       Vancomycin Level, Trough (08..20 @ 15:53)    Vancomycin Level, Trough: 18.2: Vancomycin trough levels should be rapidly reached and maintained at  15-20 ug/ml for life threatening MRSA  infections such as sepsis, endocarditis, osteomyelitis and pneumonia.     Vancomycin Level, Trough (.20 @ 08:16)    Vancomycin Level, Trough: 29.2: Vancomycin trough levels should be rapidly reached and maintained at  15-20 ug/ml for life threatening MRSA  infections such as sepsis, endocarditis, osteomyelitis and pneumonia.         MEDICATIONS  (STANDING):    chlorhexidine 2% Cloths 1 Application(s) Topical <User Schedule>  chlorhexidine 2% Cloths 1 Application(s) Topical daily  dextrose 5% + sodium chloride 0.9%. 1000 milliLiter(s) (50 mL/Hr) IV Continuous <Continuous>  epoetin joey-epbx (RETACRIT) Injectable 4000 Unit(s) IV Push <User Schedule>  metoprolol tartrate 12.5 milliGRAM(s) Oral two times a day  mupirocin 2% Nasal 1 Application(s) Nasal every 12 hours        RADIOLOGY & ADDITIONAL TESTS:    20 : CT Abdomen and Pelvis No Cont (20 @ 14:11) No retroperitoneal hematoma.  Large amount of stool within the rectum.    20 : CT Neck Soft Tissue w/ IV Cont (20 @ 14:10) No cervical fluid collection. If malignancy is a strong clinical concern, PET CT exam recommended for further evaluation.    Multiple pulmonary nodules         < from: Transthoracic Echocardiogram (20 @ 08:49) >  1. Normal mitral valve.  2. Normal trileaflet aortic valve. Mild aortic  regurgitation.  3. Aortic Root: 2.7 cm.  4. Normal left atrium.  LA volume index = 26 cc/m2.  5. Mild concentric left ventricular hypertrophy.  6. Hyperdynamic left ventricular systolic function (EF  >70%).  7. Grade I diastolic dysfunction (Impaired relaxation).  8. Normal right atrium.  9. Normal right ventricular size and systolic function  (TAPSE  2.1cm).  10. Unable to estimate RVSP.  11. Normal tricuspid valve.  12. Normal pulmonic valve.  13. Normal pericardium with no pericardial effusion.    < end of copied text >    20: US Duplex Hemodialysis Access (20 @ 20:44) >  impression: The left upper extremity AVG (likely brachial artery-graft-cephalic vein) is patent. There is narrowing of the graft lumen due to the presence of thrombus, about 40% narrowing. 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      20: Xray Chest 1 View-PORTABLE IMMEDIATE (20 @ 17:54) The cardiac silhouette is within normal limits. The lungs are clear. No pleural abnormality.          MICROBIOLOGY DATA:      Culture - Blood (20 @ 11:14)    Specimen Source: .Blood Blood-Peripheral    Culture Results:   No growth to date.        Culture - Blood (20 @ 08:06)    Gram Stain:   Growth in aerobic bottle: Gram Positive Cocci in Clusters  Growth in anaerobic bottle: Gram Positive Cocci in Clusters    Specimen Source: .Blood Blood-Peripheral    Culture Results:   Growth in aerobic bottle: Staphylococcus epidermidis "Susceptibilities  not performed"  Growth in anaerobic bottle: Gram Positive Cocci in Clusters        Culture - Blood (20 @ 08:06)    Gram Stain:   Growth in aerobic bottle: Gram Positive Cocci in Clusters    Specimen Source: .Blood Blood-Peripheral    Culture Results:   Growth in aerobic bottle: Gram Positive Cocci in Clusters        Culture - Blood (20 @ 08:06)    Specimen Source: .Blood Blood-Peripheral    Culture Results:   No growth to date.      Culture - Surgical Swab (20 @ 06:41)    Specimen Source: .Surgical Swab left arm av graft C&S    Culture Results:   No growth      COVID-19  Antibody - for prior infection screening (20 @ 10:08)    COVID-19 IgG Antibody Index: 7.70: Abbott CMIA  Negative Result    <= 1.39 Index  Positive Result      >= 1.40 Index Index    COVID-19 IgG Antibody Interpretation: Positive: This test has not been reviewed by the FDA by the standard review  process. It has been authorized for emergency use by the FDA. [employer] has validated this test to be accurate.  Negative results do not rule out SARS-CoV-2 infection, particularly in  those who have been in recent contact with the virus. Follow-up testing  with a molecular diagnostic test should be considered to rule out  infection in these individuals.  Results from antibody testing should not be used as thesole basis to  diagnose or exclude SARS-CoV-2 infection, or to inform infection status.  Positive results may rarely be due to past or present infection with  non-SARS-CoV-2 coronavirus strains, such as coronavirus HKU1, NL63, OC43,  or 229E. [employer], through extensive validation  testing, has confirmed that this risk is minimal with this test.      Culture - Urine (20 @ 01:34)    Specimen Source: .Urine Clean Catch (Midstream)    Culture Results:   >=3 organisms. Probable collection contamination.      Culture - Blood (20 @ 23:02)    Gram Stain:   Growth in anaerobic bottle: Gram Positive Cocci in Clusters  Growth in aerobic bottle: Gram Positive Cocci in Clusters    -  Ampicillin/Sulbactam: R <=8/4    -  Cefazolin: R <=4    -  Clindamycin: R >4    -  Erythromycin: R >4    -  Gentamicin: R >8 Should not be used as monotherapy    -  Oxacillin: R >2    -  Penicillin: R 8    -  RIF- Rifampin: S <=1 Should not be used as monotherapy    -  Tetra/Doxy: S <=1    -  Trimethoprim/Sulfamethoxazole: R >2/38    -  Vancomycin: S 2    -  Coagulase negative Staphylococcus: Detec    Specimen Source: .Blood Blood-Peripheral    Organism: Blood Culture PCR    Organism: Staphylococcus epidermidis    Culture Results:   Growth in aerobic and anaerobic bottles: Staphylococcus epidermidis  "Due to technical problems, Proteus sp. will Not be reported as part of  the BCID panel until further notice"  ***Blood Panel PCR results on this specimen are available  approximately 3 hours after the Gram stain result.***  Gram stain, PCR, and/or culture results may not always  correspond due to difference in methodologies.  ************************************************************  This PCR assay was performed using [employer].  The following targets are tested for: Enterococcus,  vancomycin resistant enterococci, Listeria monocytogenes,  coagulase negative staphylococci, S. aureus,  methicillin resistant S. aureus, Streptococcus agalactiae  (Group B), S. pneumoniae, S. pyogenes (Group A),  Acinetobacter baumannii, Enterobacter cloacae, E. coli,  Klebsiella oxytoca, K. pneumoniae, Proteus sp.,  Serratia marcescens, Haemophilus influenzae,  Neisseria meningitidis, Pseudomonas aeruginosa, Candida  albicans, C. glabrata, C krusei, C parapsilosis,  C. tropicalis and the KPC resistance gene.    Organism Identification: Blood Culture PCR  Staphylococcus epidermidis    Method Type: PCR    Method Type: JOSÉ MIGUEL      Culture - Blood (20 @ 23:02)    Gram Stain:   Growth in anaerobic bottle: Gram Positive Cocci in Clusters  Growth in aerobic bottle: Gram Positive Cocci in Clusters    -  Coagulase negative Staphylococcus: Detec    Specimen Source: .Blood Blood-Peripheral    Organism: Blood Culture PCR    Culture Results:   Growth in aerobic and anaerobic bottles: Staphylococcus epidermidis  "Due to technical problems, Proteus sp. will Not be reported as part of  the BCID panel until further notice"  ***Blood Panel PCR results on this specimen are available  approximately 3 hours after the Gram stain result.***  Gram stain, PCR, and/or culture results may not always  correspond due to difference in methodologies.  ************************************************************  This PCR assay was performed using [employer].  The following targets are tested for: Enterococcus,  vancomycin resistant enterococci, Listeria monocytogenes,  coagulase negative staphylococci, S. aureus,  methicillin resistant S. aureus, Streptococcus agalactiae  (Group B), S. pneumoniae, S. pyogenes (Group A),  Acinetobacter baumannii, Enterobacter cloacae, E. coli,  Klebsiella oxytoca, K. pneumoniae, Proteus sp.,  Serratia marcescens, Haemophilus influenzae,  Neisseria meningitidis, Pseudomonas aeruginosa, Candida  albicans, C. glabrata, C krusei, C parapsilosis,  C. tropicalis and the KPC resistance gene.    Organism Identification: Blood Culture PCR    Method Type: PCR

## 2020-08-06 NOTE — CONSULT NOTE ADULT - SUBJECTIVE AND OBJECTIVE BOX
PULMONARY CONSULT NOTE      RED BALBUENA  MRN-[id number]    Patient is a 69y old  Female who presents with a chief complaint of Painful swelling at AV fistula. (06 Aug 2020 09:58)      HISTORY OF PRESENT ILLNESS:  History of Present Illness:  Reason for Admission: Painful swelling at AV fistula.	  History of Present Illness: 	  69F from [facility], with PMH of HTN, ESRD, DM, Hyperphosphatemia,  Oral cancer(s/p excision) , comes to the ED  with left arm AV fistula pain and swelling x1 week. Pt was in her usual state of health until last week when she went for her HD session and developed gradual onset of swelling and pain at the AVF site on L arm. As per the patient the HD nurse had inserted the needle incorrectly that caused the swelling and severe pain. AVF however was not occluded and she had her HD sessions as per the schedule T/Th/Sa with her last HD session being yesterday.  Reports that she has chest pain in the mid chest that is chronic for years now.   Pt denies any smoking, alcohol or any form of substance abuse.   In the ED,   Pt is AAOX3, moderate distress due to pain   Vitals- 113/ 68, HR- 77, afebrile  wbc- 10.37  Hb 7.1  UA- UTI   EKG- NSR    Pt is awake, alert, oob to chair in NAD. Found to have pulm nodules. Pending CT chest.     MEDICATIONS  (STANDING):  chlorhexidine 2% Cloths 1 Application(s) Topical <User Schedule>  chlorhexidine 2% Cloths 1 Application(s) Topical daily  dextrose 5% + sodium chloride 0.9%. 1000 milliLiter(s) (50 mL/Hr) IV Continuous <Continuous>  epoetin joey-epbx (RETACRIT) Injectable 4000 Unit(s) IV Push <User Schedule>  metoprolol tartrate 12.5 milliGRAM(s) Oral two times a day  mupirocin 2% Nasal 1 Application(s) Nasal every 12 hours      MEDICATIONS  (PRN):  acetaminophen   Tablet .. 650 milliGRAM(s) Oral every 6 hours PRN Mild Pain (1 - 3)  HYDROmorphone  Injectable 0.5 milliGRAM(s) IV Push every 6 hours PRN Moderate Pain (4 - 6)      Allergies    sulfADIAZINE (Angioedema)    Intolerances        PAST MEDICAL & SURGICAL HISTORY:  DM (diabetes mellitus)  Vitamin D deficiency  ESRD (end stage renal disease)  Hyperphosphatemia  Hypertension  S/P bunionectomy: bilateral great toes  H/O hemorrhoidectomy  H/O oral cancer  AVF (arteriovenous fistula): x3      FAMILY HISTORY:  Family history of diabetes mellitus in sister (Sibling): and HTN      SOCIAL HISTORY  Smoking History:     REVIEW OF SYSTEMS:    CONSTITUTIONAL:  No fevers, chills, sweats    HEENT:  Eyes:  No diplopia or blurred vision. ENT:  No earache, sore throat or runny nose.    CARDIOVASCULAR:  No pressure, squeezing, tightness, or heaviness about the chest; no palpitations.    RESPIRATORY:  Per HPI    GASTROINTESTINAL:  No abdominal pain, nausea, vomiting or diarrhea.    GENITOURINARY:  No dysuria, frequency or urgency.    NEUROLOGIC:  No paresthesias, fasciculations, seizures or weakness.    PSYCHIATRIC:  No disorder of thought or mood.    Vital Signs Last 24 Hrs  T(C): 36.7 (06 Aug 2020 05:17), Max: 36.8 (05 Aug 2020 18:30)  T(F): 98 (06 Aug 2020 05:17), Max: 98.3 (05 Aug 2020 18:30)  HR: 76 (06 Aug 2020 05:17) (64 - 87)  BP: 119/74 (06 Aug 2020 05:17) (119/74 - 173/95)  BP(mean): --  RR: 18 (06 Aug 2020 05:17) (18 - 18)  SpO2: 100% (06 Aug 2020 05:17) (100% - 100%)  I&O's Detail    05 Aug 2020 07:01  -  06 Aug 2020 07:00  --------------------------------------------------------  IN:    Other: 400 mL  Total IN: 400 mL    OUT:    Other: 1277 mL  Total OUT: 1277 mL    Total NET: -877 mL          PHYSICAL EXAMINATION:    GENERAL: The patient is a well-developed, well-nourished _____in no apparent distress.     HEENT: Head is normocephalic and atraumatic. Extraocular muscles are intact. Mucous membranes are moist.     NECK: Supple.     LUNGS: Clear to auscultation without wheezing, rales, or rhonchi. Respirations unlabored    HEART: Regular rate and rhythm without murmur.    ABDOMEN: Soft, nontender, and nondistended.  No hepatosplenomegaly is noted.    EXTREMITIES: Without any cyanosis, clubbing, rash, lesions or edema.    NEUROLOGIC: Grossly intact.      LABS:                        9.4    8.99  )-----------( 344      ( 06 Aug 2020 07:10 )             29.3     08-06    142  |  103  |  7   ----------------------------<  81  3.9   |  29  |  3.03<H>    Ca    8.3<L>      06 Aug 2020 07:10  Phos  3.9     08-05  Mg     2.0     08-05    TPro  6.7  /  Alb  1.8<L>  /  TBili  0.5  /  DBili  x   /  AST  23  /  ALT  17  /  AlkPhos  241<H>  08-05    PT/INR - ( 05 Aug 2020 10:35 )   PT: 16.0 sec;   INR: 1.39 ratio         MICROBIOLOGY:    RADIOLOGY & ADDITIONAL STUDIES:    CXR:  < from: Xray Chest 1 View-PORTABLE IMMEDIATE (07.29.20 @ 17:54) >  IMPRESSION: Clear lungs.    < end of copied text >    Ct scan chest:  < from: CT Abdomen and Pelvis No Cont (08.02.20 @ 14:11) >  IMPRESSION:  No retroperitoneal hematoma.    Large amount of stool within the rectum.    < end of copied text >    < from: CT Abdomen and Pelvis No Cont (08.02.20 @ 14:11) >  LOWER CHEST: Small bilateral pleural effusions. Partial compressive atelectasis of both lower lobes.    < end of copied text >      < from: IR US Guided Vascular Access (08.05.20 @ 13:55) >  IMPRESSION:  SUCCESSFUL ULTRASOUND AND FLUOROSCOPY GUIDED PLACEMENT OFTUNNELED DIALYSIS CATHETER VIA RIGHT INTERNAL JUGULAR VEIN. TIP OF CATHETER IS AT DISTAL SVC-RIGHT ATRIAL JUNCTION.    < end of copied text >    ekg;    < from: Transthoracic Echocardiogram (07.31.20 @ 08:49) >  CONCLUSIONS:  1. Normal mitral valve.  2. Normal trileaflet aortic valve. Mild aortic  regurgitation.  3. Aortic Root: 2.7 cm.  4. Normal left atrium.  LA volume index = 26 cc/m2.  5. Mild concentric left ventricular hypertrophy.  6. Hyperdynamic left ventricular systolic function (EF  >70%).  7. Grade I diastolic dysfunction (Impaired relaxation).  8. Normal right atrium.  9. Normal right ventricular size and systolic function  (TAPSE  2.1cm).  10. Unable to estimate RVSP.  11. Normal tricuspid valve.  12. Normal pulmonic valve.  13. Normal pericardium with no pericardial effusion.    < end of copied text >    < from: CT Neck Soft Tissue w/ IV Cont (08.02.20 @ 14:10) >  IMPRESSION:  No cervical fluid collection. If malignancy is a strong clinical concern, PET CT exam recommended for further evaluation.    Multiple pulmonary nodules as described above. CT chest recommended    < end of copied text >    < from: CT Abdomen and Pelvis No Cont (08.02.20 @ 14:11) >  IMPRESSION:  No retroperitoneal hematoma.    Large amount of stool within the rectum.    < end of copied text >

## 2020-08-06 NOTE — CONSULT NOTE ADULT - REASON FOR ADMISSION
Painful swelling at AV fistula.

## 2020-08-07 VITALS
OXYGEN SATURATION: 100 % | SYSTOLIC BLOOD PRESSURE: 139 MMHG | DIASTOLIC BLOOD PRESSURE: 86 MMHG | TEMPERATURE: 97 F | RESPIRATION RATE: 17 BRPM | HEART RATE: 79 BPM

## 2020-08-07 LAB
ALBUMIN SERPL ELPH-MCNC: 1.4 G/DL — LOW (ref 3.5–5)
ALP SERPL-CCNC: 176 U/L — HIGH (ref 40–120)
ALT FLD-CCNC: 14 U/L DA — SIGNIFICANT CHANGE UP (ref 10–60)
ANION GAP SERPL CALC-SCNC: 8 MMOL/L — SIGNIFICANT CHANGE UP (ref 5–17)
AST SERPL-CCNC: 21 U/L — SIGNIFICANT CHANGE UP (ref 10–40)
BILIRUB SERPL-MCNC: 0.4 MG/DL — SIGNIFICANT CHANGE UP (ref 0.2–1.2)
BUN SERPL-MCNC: 13 MG/DL — SIGNIFICANT CHANGE UP (ref 7–18)
CALCIUM SERPL-MCNC: 8.4 MG/DL — SIGNIFICANT CHANGE UP (ref 8.4–10.5)
CHLORIDE SERPL-SCNC: 104 MMOL/L — SIGNIFICANT CHANGE UP (ref 96–108)
CO2 SERPL-SCNC: 29 MMOL/L — SIGNIFICANT CHANGE UP (ref 22–31)
CREAT SERPL-MCNC: 4.22 MG/DL — HIGH (ref 0.5–1.3)
CULTURE RESULTS: SIGNIFICANT CHANGE UP
GLUCOSE BLDC GLUCOMTR-MCNC: 93 MG/DL — SIGNIFICANT CHANGE UP (ref 70–99)
GLUCOSE BLDC GLUCOMTR-MCNC: 96 MG/DL — SIGNIFICANT CHANGE UP (ref 70–99)
GLUCOSE SERPL-MCNC: 156 MG/DL — HIGH (ref 70–99)
HCT VFR BLD CALC: 27.4 % — LOW (ref 34.5–45)
HGB BLD-MCNC: 8.8 G/DL — LOW (ref 11.5–15.5)
MCHC RBC-ENTMCNC: 30.7 PG — SIGNIFICANT CHANGE UP (ref 27–34)
MCHC RBC-ENTMCNC: 32.1 GM/DL — SIGNIFICANT CHANGE UP (ref 32–36)
MCV RBC AUTO: 95.5 FL — SIGNIFICANT CHANGE UP (ref 80–100)
NRBC # BLD: 0 /100 WBCS — SIGNIFICANT CHANGE UP (ref 0–0)
PLATELET # BLD AUTO: 291 K/UL — SIGNIFICANT CHANGE UP (ref 150–400)
POTASSIUM SERPL-MCNC: 3.4 MMOL/L — LOW (ref 3.5–5.3)
POTASSIUM SERPL-SCNC: 3.4 MMOL/L — LOW (ref 3.5–5.3)
PROT SERPL-MCNC: 5.2 G/DL — LOW (ref 6–8.3)
RBC # BLD: 2.87 M/UL — LOW (ref 3.8–5.2)
RBC # FLD: 20.3 % — HIGH (ref 10.3–14.5)
SODIUM SERPL-SCNC: 141 MMOL/L — SIGNIFICANT CHANGE UP (ref 135–145)
SPECIMEN SOURCE: SIGNIFICANT CHANGE UP
VANCOMYCIN TROUGH SERPL-MCNC: 19.7 UG/ML — SIGNIFICANT CHANGE UP (ref 10–20)
WBC # BLD: 8.47 K/UL — SIGNIFICANT CHANGE UP (ref 3.8–10.5)
WBC # FLD AUTO: 8.47 K/UL — SIGNIFICANT CHANGE UP (ref 3.8–10.5)

## 2020-08-07 PROCEDURE — 92610 EVALUATE SWALLOWING FUNCTION: CPT

## 2020-08-07 PROCEDURE — 77001 FLUOROGUIDE FOR VEIN DEVICE: CPT

## 2020-08-07 PROCEDURE — 83605 ASSAY OF LACTIC ACID: CPT

## 2020-08-07 PROCEDURE — 97530 THERAPEUTIC ACTIVITIES: CPT

## 2020-08-07 PROCEDURE — 71045 X-RAY EXAM CHEST 1 VIEW: CPT

## 2020-08-07 PROCEDURE — 36558 INSERT TUNNELED CV CATH: CPT

## 2020-08-07 PROCEDURE — 96374 THER/PROPH/DIAG INJ IV PUSH: CPT

## 2020-08-07 PROCEDURE — C1769: CPT

## 2020-08-07 PROCEDURE — 80202 ASSAY OF VANCOMYCIN: CPT

## 2020-08-07 PROCEDURE — P9047: CPT

## 2020-08-07 PROCEDURE — 85027 COMPLETE CBC AUTOMATED: CPT

## 2020-08-07 PROCEDURE — P9040: CPT

## 2020-08-07 PROCEDURE — 83540 ASSAY OF IRON: CPT

## 2020-08-07 PROCEDURE — 97110 THERAPEUTIC EXERCISES: CPT

## 2020-08-07 PROCEDURE — 85730 THROMBOPLASTIN TIME PARTIAL: CPT

## 2020-08-07 PROCEDURE — 84484 ASSAY OF TROPONIN QUANT: CPT

## 2020-08-07 PROCEDURE — 93306 TTE W/DOPPLER COMPLETE: CPT

## 2020-08-07 PROCEDURE — 86769 SARS-COV-2 COVID-19 ANTIBODY: CPT

## 2020-08-07 PROCEDURE — 86703 HIV-1/HIV-2 1 RESULT ANTBDY: CPT

## 2020-08-07 PROCEDURE — 87070 CULTURE OTHR SPECIMN AEROBIC: CPT

## 2020-08-07 PROCEDURE — 82550 ASSAY OF CK (CPK): CPT

## 2020-08-07 PROCEDURE — 84100 ASSAY OF PHOSPHORUS: CPT

## 2020-08-07 PROCEDURE — 87086 URINE CULTURE/COLONY COUNT: CPT

## 2020-08-07 PROCEDURE — 83735 ASSAY OF MAGNESIUM: CPT

## 2020-08-07 PROCEDURE — 86850 RBC ANTIBODY SCREEN: CPT

## 2020-08-07 PROCEDURE — 81001 URINALYSIS AUTO W/SCOPE: CPT

## 2020-08-07 PROCEDURE — 80053 COMPREHEN METABOLIC PANEL: CPT

## 2020-08-07 PROCEDURE — 86901 BLOOD TYPING SEROLOGIC RH(D): CPT

## 2020-08-07 PROCEDURE — 80048 BASIC METABOLIC PNL TOTAL CA: CPT

## 2020-08-07 PROCEDURE — 87641 MR-STAPH DNA AMP PROBE: CPT

## 2020-08-07 PROCEDURE — 82746 ASSAY OF FOLIC ACID SERUM: CPT

## 2020-08-07 PROCEDURE — 71250 CT THORAX DX C-: CPT

## 2020-08-07 PROCEDURE — 88304 TISSUE EXAM BY PATHOLOGIST: CPT

## 2020-08-07 PROCEDURE — 74176 CT ABD & PELVIS W/O CONTRAST: CPT

## 2020-08-07 PROCEDURE — 99285 EMERGENCY DEPT VISIT HI MDM: CPT

## 2020-08-07 PROCEDURE — 83036 HEMOGLOBIN GLYCOSYLATED A1C: CPT

## 2020-08-07 PROCEDURE — 82607 VITAMIN B-12: CPT

## 2020-08-07 PROCEDURE — 96375 TX/PRO/DX INJ NEW DRUG ADDON: CPT

## 2020-08-07 PROCEDURE — 86923 COMPATIBILITY TEST ELECTRIC: CPT

## 2020-08-07 PROCEDURE — 87150 DNA/RNA AMPLIFIED PROBE: CPT

## 2020-08-07 PROCEDURE — 83550 IRON BINDING TEST: CPT

## 2020-08-07 PROCEDURE — 36415 COLL VENOUS BLD VENIPUNCTURE: CPT

## 2020-08-07 PROCEDURE — 97116 GAIT TRAINING THERAPY: CPT

## 2020-08-07 PROCEDURE — 93005 ELECTROCARDIOGRAM TRACING: CPT

## 2020-08-07 PROCEDURE — 84443 ASSAY THYROID STIM HORMONE: CPT

## 2020-08-07 PROCEDURE — 82553 CREATINE MB FRACTION: CPT

## 2020-08-07 PROCEDURE — C1889: CPT

## 2020-08-07 PROCEDURE — 93990 DOPPLER FLOW TESTING: CPT

## 2020-08-07 PROCEDURE — C1750: CPT

## 2020-08-07 PROCEDURE — 86900 BLOOD TYPING SEROLOGIC ABO: CPT

## 2020-08-07 PROCEDURE — 70491 CT SOFT TISSUE NECK W/DYE: CPT

## 2020-08-07 PROCEDURE — 82728 ASSAY OF FERRITIN: CPT

## 2020-08-07 PROCEDURE — 99261: CPT

## 2020-08-07 PROCEDURE — 76937 US GUIDE VASCULAR ACCESS: CPT

## 2020-08-07 PROCEDURE — 87186 SC STD MICRODIL/AGAR DIL: CPT

## 2020-08-07 PROCEDURE — 36430 TRANSFUSION BLD/BLD COMPNT: CPT

## 2020-08-07 PROCEDURE — 85610 PROTHROMBIN TIME: CPT

## 2020-08-07 PROCEDURE — 87640 STAPH A DNA AMP PROBE: CPT

## 2020-08-07 PROCEDURE — 80061 LIPID PANEL: CPT

## 2020-08-07 PROCEDURE — 87340 HEPATITIS B SURFACE AG IA: CPT

## 2020-08-07 PROCEDURE — 87040 BLOOD CULTURE FOR BACTERIA: CPT

## 2020-08-07 PROCEDURE — 82962 GLUCOSE BLOOD TEST: CPT

## 2020-08-07 PROCEDURE — U0003: CPT

## 2020-08-07 RX ORDER — INSULIN LISPRO 100/ML
VIAL (ML) SUBCUTANEOUS
Refills: 0 | Status: DISCONTINUED | OUTPATIENT
Start: 2020-08-07 | End: 2020-08-07

## 2020-08-07 RX ORDER — INSULIN LISPRO 100/ML
1 VIAL (ML) SUBCUTANEOUS
Qty: 5 | Refills: 0
Start: 2020-08-07 | End: 2020-09-05

## 2020-08-07 RX ORDER — CALAMINE AND ZINC OXIDE AND PHENOL 160; 10 MG/ML; MG/ML
1 LOTION TOPICAL
Qty: 0 | Refills: 0 | DISCHARGE
Start: 2020-08-07

## 2020-08-07 RX ORDER — VANCOMYCIN HCL 1 G
1000 VIAL (EA) INTRAVENOUS ONCE
Refills: 0 | Status: DISCONTINUED | OUTPATIENT
Start: 2020-08-07 | End: 2020-08-07

## 2020-08-07 RX ORDER — VANCOMYCIN HCL 1 G
1000 VIAL (EA) INTRAVENOUS ONCE
Refills: 0 | Status: COMPLETED | OUTPATIENT
Start: 2020-08-07 | End: 2020-08-07

## 2020-08-07 RX ORDER — CALAMINE AND ZINC OXIDE AND PHENOL 160; 10 MG/ML; MG/ML
1 LOTION TOPICAL DAILY
Refills: 0 | Status: DISCONTINUED | OUTPATIENT
Start: 2020-08-07 | End: 2020-08-07

## 2020-08-07 RX ORDER — VANCOMYCIN HCL 1 G
1 VIAL (EA) INTRAVENOUS
Qty: 10 | Refills: 0
Start: 2020-08-07 | End: 2020-08-29

## 2020-08-07 RX ORDER — MUPIROCIN 20 MG/G
1 OINTMENT TOPICAL
Refills: 0 | Status: DISCONTINUED | OUTPATIENT
Start: 2020-08-07 | End: 2020-08-07

## 2020-08-07 RX ORDER — ASCORBIC ACID 60 MG
1 TABLET,CHEWABLE ORAL
Qty: 30 | Refills: 0
Start: 2020-08-07 | End: 2020-09-05

## 2020-08-07 RX ADMIN — Medication 12.5 MILLIGRAM(S): at 05:31

## 2020-08-07 RX ADMIN — CHLORHEXIDINE GLUCONATE 1 APPLICATION(S): 213 SOLUTION TOPICAL at 05:32

## 2020-08-07 RX ADMIN — ERYTHROPOIETIN 4000 UNIT(S): 10000 INJECTION, SOLUTION INTRAVENOUS; SUBCUTANEOUS at 11:59

## 2020-08-07 RX ADMIN — MUPIROCIN 1 APPLICATION(S): 20 OINTMENT TOPICAL at 05:32

## 2020-08-07 RX ADMIN — Medication 250 MILLIGRAM(S): at 12:03

## 2020-08-07 NOTE — PROGRESS NOTE ADULT - NSHPATTENDINGPLANDISCUSS_GEN_ALL_CORE
Dr. Alvaro Ryan
signed out to covering specials PA
MEDICAL TAEM
THE MEDICAL TEAM AND THE PATIENT
medical team and the franko
patient and medical team
the patient and medical team
the patient and medical team
icu team
icu team on rounds
icu team on rounds

## 2020-08-07 NOTE — PROGRESS NOTE ADULT - ASSESSMENT
69F s/p ligation of infected L AVG pseudoaneurysm 7/30, s/p IR rt IJ tunneled catheter 8/5, s/p removal of rt groin shiley    - daily LUE dressing changes with aquacell silver  - continue elevation of LUE  - cont IV abx as per ID   - f/u as outpatient with Dr. Galeas

## 2020-08-07 NOTE — PROGRESS NOTE ADULT - SUBJECTIVE AND OBJECTIVE BOX
Patient is a 69y old  Female who presents with a chief complaint of Painful swelling at AV fistula. (07 Aug 2020 10:16)  Awake, alert, comfortable in bed in NAD    INTERVAL HPI/OVERNIGHT EVENTS:      VITAL SIGNS:  T(F): 98.3 (08-07-20 @ 05:13)  HR: 79 (08-07-20 @ 05:13)  BP: 138/91 (08-07-20 @ 05:13)  RR: 18 (08-07-20 @ 05:13)  SpO2: 95% (08-07-20 @ 05:13)  Wt(kg): --  I&O's Detail          REVIEW OF SYSTEMS:    CONSTITUTIONAL:  No fevers, chills, sweats    HEENT:  Eyes:  No diplopia or blurred vision. ENT:  No earache, sore throat or runny nose.    CARDIOVASCULAR:  No pressure, squeezing, tightness, or heaviness about the chest; no palpitations.    RESPIRATORY:  Per HPI    GASTROINTESTINAL:  No abdominal pain, nausea, vomiting or diarrhea.    GENITOURINARY:  No dysuria, frequency or urgency.    NEUROLOGIC:  No paresthesias, fasciculations, seizures or weakness.    PSYCHIATRIC:  No disorder of thought or mood.      PHYSICAL EXAM:    Constitutional: Well developed and nourished  Eyes:Perrla  ENMT: normal  Neck:supple  Respiratory: good air entry  Cardiovascular: S1 S2 regular  Gastrointestinal: Soft, Non tender  Extremities: No edema  Vascular:normal  Neurological:Awake, alert,Ox3  Musculoskeletal:Normal      MEDICATIONS  (STANDING):  chlorhexidine 2% Cloths 1 Application(s) Topical <User Schedule>  chlorhexidine 2% Cloths 1 Application(s) Topical daily  dextrose 5% + sodium chloride 0.9%. 1000 milliLiter(s) (50 mL/Hr) IV Continuous <Continuous>  epoetin joey-epbx (RETACRIT) Injectable 4000 Unit(s) IV Push <User Schedule>  metoprolol tartrate 12.5 milliGRAM(s) Oral two times a day  mupirocin 2% Nasal 1 Application(s) Nasal every 12 hours    MEDICATIONS  (PRN):  acetaminophen   Tablet .. 650 milliGRAM(s) Oral every 6 hours PRN Mild Pain (1 - 3)  calamine/zinc oxide Lotion 1 Application(s) Topical daily PRN Itching  HYDROmorphone  Injectable 0.5 milliGRAM(s) IV Push every 6 hours PRN Moderate Pain (4 - 6)      Allergies    sulfADIAZINE (Angioedema)    Intolerances        LABS:                        8.8    8.47  )-----------( 291      ( 07 Aug 2020 10:18 )             27.4     08-06    142  |  103  |  7   ----------------------------<  81  3.9   |  29  |  3.03<H>    Ca    8.3<L>      06 Aug 2020 07:10  Phos  3.9     08-05  Mg     2.0     08-05    TPro  6.7  /  Alb  1.8<L>  /  TBili  0.5  /  DBili  x   /  AST  23  /  ALT  17  /  AlkPhos  241<H>  08-05    PT/INR - ( 05 Aug 2020 10:35 )   PT: 16.0 sec;   INR: 1.39 ratio                   CAPILLARY BLOOD GLUCOSE      POCT Blood Glucose.: 93 mg/dL (07 Aug 2020 07:24)  POCT Blood Glucose.: 145 mg/dL (06 Aug 2020 21:58)  POCT Blood Glucose.: 129 mg/dL (06 Aug 2020 16:35)  POCT Blood Glucose.: 121 mg/dL (06 Aug 2020 11:28)        RADIOLOGY & ADDITIONAL TESTS:    CXR:    Ct scan chest:  < from: CT Chest No Cont (08.06.20 @ 16:53) >  IMPRESSION:    Nonspecific 5 mm and 7 mm left upper lobe pulmonary nodules new since 2016. CT at 3-6 month to confirm persistence. If unchanged and solid component remains <6mm, annual CT should be performed for 5 years. Persistent part-solid nodules with solid components >= 6 mm should be considered highly suspicious.      < end of copied text >    ekg;    echo:

## 2020-08-07 NOTE — PROGRESS NOTE ADULT - ASSESSMENT
1. Infected Prosthetic Vascular Graft   - LUE  - with Cellulitis   - Vasc surg F/U   - Had Excision and Ligation of arteriovenous fistula/access graft of upper extremity 7/30/20  - Wound care   - Abx   - ID F/U     2. UTI  - Abx   - Repeat UA/C&S    3. ESRD   - On HD   - Renal F/U     4. DM   - Accuchecks  - Regular insulin coverage per HSS     5. Pulmonary Nodules  - seen on CTA neck   - CT chest done  - Quantiferon TB gold   - D/C to [facility] with outpatient follow up  -May need pet scan if Quantiferon is neg

## 2020-08-07 NOTE — PROGRESS NOTE ADULT - SUBJECTIVE AND OBJECTIVE BOX
CHIEF COMPLAINT:Patient is a 69y old  Female who presents with a chief complaint of Painful swelling at AV fistula. Pt appears comfortable.    	  REVIEW OF SYSTEMS:  CONSTITUTIONAL: No fever, weight loss, or fatigue  EYES: No eye pain, visual disturbances, or discharge  ENT:  No difficulty hearing, tinnitus, vertigo; No sinus or throat pain  NECK: No pain or stiffness  RESPIRATORY: No cough, wheezing, chills or hemoptysis; No Shortness of Breath  CARDIOVASCULAR: No chest pain, palpitations, passing out, dizziness, or leg swelling  GASTROINTESTINAL: No abdominal or epigastric pain. No nausea, vomiting, or hematemesis; No diarrhea or constipation. No melena or hematochezia.  GENITOURINARY: No dysuria, frequency, hematuria, or incontinence  NEUROLOGICAL: No headaches, memory loss, loss of strength, numbness, or tremors  SKIN: No itching, burning, rashes, or lesions   LYMPH Nodes: No enlarged glands  ENDOCRINE: No heat or cold intolerance; No hair loss  MUSCULOSKELETAL: No joint pain or swelling; No muscle, back, or extremity pain  PSYCHIATRIC: No depression, anxiety, mood swings, or difficulty sleeping  HEME/LYMPH: No easy bruising, or bleeding gums  ALLERGY AND IMMUNOLOGIC: No hives or eczema	        PHYSICAL EXAM:  T(C): 36.8 (08-07-20 @ 05:13), Max: 36.8 (08-07-20 @ 05:13)  HR: 79 (08-07-20 @ 05:13) (74 - 80)  BP: 138/91 (08-07-20 @ 05:13) (131/57 - 155/87)  RR: 18 (08-07-20 @ 05:13) (16 - 18)  SpO2: 95% (08-07-20 @ 05:13) (95% - 100%)  Wt(kg): --  I&O's Summary      Appearance: Normal	  HEENT:   Normal oral mucosa, PERRL, EOMI	  Lymphatic: No lymphadenopathy  Cardiovascular: Normal S1 S2, No JVD, No murmurs, No edema  Respiratory: Lungs clear to auscultation	  Psychiatry: A & O x 3, Mood & affect appropriate  Gastrointestinal:  Soft, Non-tender, + BS	  Skin: No rashes, No ecchymoses, No cyanosis	  Neurologic: Non-focal  Extremities: Normal range of motion, No clubbing, cyanosis or edema  Vascular: Peripheral pulses palpable 2+ bilaterally    MEDICATIONS  (STANDING):  chlorhexidine 2% Cloths 1 Application(s) Topical <User Schedule>  chlorhexidine 2% Cloths 1 Application(s) Topical daily  dextrose 5% + sodium chloride 0.9%. 1000 milliLiter(s) (50 mL/Hr) IV Continuous <Continuous>  epoetin joey-epbx (RETACRIT) Injectable 4000 Unit(s) IV Push <User Schedule>  metoprolol tartrate 12.5 milliGRAM(s) Oral two times a day  mupirocin 2% Nasal 1 Application(s) Nasal every 12 hours      	  LABS:	 	                       9.4    8.99  )-----------( 344      ( 06 Aug 2020 07:10 )             29.3     08-06    142  |  103  |  7   ----------------------------<  81  3.9   |  29  |  3.03<H>    Ca    8.3<L>      06 Aug 2020 07:10  Phos  3.9     08-05  Mg     2.0     08-05    TPro  6.7  /  Alb  1.8<L>  /  TBili  0.5  /  DBili  x   /  AST  23  /  ALT  17  /  AlkPhos  241<H>  08-05      Lipid Profile: Cholesterol <50  LDL <24  HDL 11  TG 77    TSH: Thyroid Stimulating Hormone, Serum: 3.09 uU/mL (07-30 @ 06:31)      	    COVID-19 PCR . (08.06.20 @ 06:17)    COVID-19 PCR: NotDetec: Testing is performed using polymerase chain reaction (PCR) or  transcription mediated amplification (TMA). This COVID-19 (SARS-CoV-2)  nucleic acid amplification test was validated by [employer] and is  in use under the FDA Emergency Use Authorization (EUA) for clinical labs  CLIA-certified to perform high complexity testing. Test results should be  correlated with clinical presentation, patient history, and epidemiology.

## 2020-08-07 NOTE — PROGRESS NOTE ADULT - SUBJECTIVE AND OBJECTIVE BOX
INTERVAL HPI/OVERNIGHT EVENTS:  No acute events overnight. Pt resting comfortably. No acute complaints. s/p Rt tunneled IJ catheter placed 8/5 by IR.    MEDICATIONS  (STANDING):  chlorhexidine 2% Cloths 1 Application(s) Topical <User Schedule>  chlorhexidine 2% Cloths 1 Application(s) Topical daily  dextrose 5% + sodium chloride 0.9%. 1000 milliLiter(s) (50 mL/Hr) IV Continuous <Continuous>  epoetin joey-epbx (RETACRIT) Injectable 4000 Unit(s) IV Push <User Schedule>  metoprolol tartrate 12.5 milliGRAM(s) Oral two times a day  mupirocin 2% Nasal 1 Application(s) Nasal every 12 hours    MEDICATIONS  (PRN):  acetaminophen   Tablet .. 650 milliGRAM(s) Oral every 6 hours PRN Mild Pain (1 - 3)  HYDROmorphone  Injectable 0.5 milliGRAM(s) IV Push every 6 hours PRN Moderate Pain (4 - 6)    ICU Vital Signs Last 24 Hrs  T(C): 36.3 (07 Aug 2020 14:43), Max: 36.9 (07 Aug 2020 10:05)  T(F): 97.4 (07 Aug 2020 14:43), Max: 98.5 (07 Aug 2020 10:05)  HR: 79 (07 Aug 2020 14:43) (68 - 89)  BP: 139/86 (07 Aug 2020 14:43) (121/92 - 162/95)  BP(mean): --  ABP: --  ABP(mean): --  RR: 17 (07 Aug 2020 14:43) (16 - 18)  SpO2: 100% (07 Aug 2020 14:43) (95% - 100%)      Physical:  General: Alert and oriented, not in acute distress  Respiratory: Breathing unlabored  Chest: right chest tunneled catheter in place  Extremities: anteromedial left upper extremity wound with no active bleeding or drainage; upper axillary pole wound and remaining open wounds dressed with aquacell and covered with dry gauze and kerlex and ace wrap. 2+ radial and ulnar pulses palpable    LABS:                                   8.8    8.47  )-----------( 291      ( 07 Aug 2020 10:18 )             27.4       08-07    141  |  104  |  13  ----------------------------<  156<H>  3.4<L>   |  29  |  4.22<H>    Ca    8.4      07 Aug 2020 10:18    TPro  5.2<L>  /  Alb  1.4<L>  /  TBili  0.4  /  DBili  x   /  AST  21  /  ALT  14  /  AlkPhos  176<H>  08-07

## 2020-08-07 NOTE — PROGRESS NOTE ADULT - REASON FOR ADMISSION
Painful swelling at AV fistula.

## 2020-08-07 NOTE — PROGRESS NOTE ADULT - ASSESSMENT
ESRD post AVF ligation  Post PC placement in right IJ, C/O itching and pain  Follow issues with dialysis nurse , change dressing to transparently one and follow symptoms  Benadryl cream in affected skin area.  Pain in exit side possible due to surgery, to follow.    Malnutrition to follow with food supplement  Dialysis today 3 K bath

## 2020-08-07 NOTE — PROGRESS NOTE ADULT - SUBJECTIVE AND OBJECTIVE BOX
Interval Events:    tolerating po intake  dialysis as scheduled  no further hypoglycemia  not requiring sliding scale    Allergies    sulfADIAZINE (Angioedema)    Intolerances      Endocrine/Metabolic Medications:      Vital Signs Last 24 Hrs  T(C): 36.9 (07 Aug 2020 10:05), Max: 36.9 (07 Aug 2020 10:05)  T(F): 98.5 (07 Aug 2020 10:05), Max: 98.5 (07 Aug 2020 10:05)  HR: 68 (07 Aug 2020 10:05) (68 - 80)  BP: 162/95 (07 Aug 2020 10:05) (131/57 - 162/95)  BP(mean): --  RR: 16 (07 Aug 2020 10:05) (16 - 18)  SpO2: 95% (07 Aug 2020 05:13) (95% - 100%)      PHYSICAL EXAM  Constitutional:    NC/AT:    HEENT:    Neck:  No JVD  Respiratory:  reduced breath sounds b/l bases    Cardiovascular:  RR   Gastrointestinal: Soft     Extremities: without cyanosis      Neurological:  non focal    LABS                        8.8    8.47  )-----------( 291      ( 07 Aug 2020 10:18 )             27.4                               141    |  104    |  13                  Calcium: 8.4   / iCa: x      (08-07 @ 10:18)    ----------------------------<  156       Magnesium: x                                3.4     |  29     |  4.22             Phosphorous: x        TPro  5.2    /  Alb  1.4    /  TBili  0.4    /  DBili  x      /  AST  21     /  ALT  14     /  AlkPhos  176    07 Aug 2020 10:18    CAPILLARY BLOOD GLUCOSE      POCT Blood Glucose.: 96 mg/dL (07 Aug 2020 11:52)  POCT Blood Glucose.: 93 mg/dL (07 Aug 2020 07:24)  POCT Blood Glucose.: 145 mg/dL (06 Aug 2020 21:58)  POCT Blood Glucose.: 129 mg/dL (06 Aug 2020 16:35)        Assesment/plan        70yo female with multiple comorbidities including h/o HTN, DM type 2, ESRD on HD admitted with AV fistula swelling and pain    Endocrinology consulted for glycemic management    DM type 2  complicated by ESRD on HD, sepsis- bacteremia, requiring pressor current admit  NH regimen:  humalog sliding scale    recommendations:  fasting , prandial tightly controlled off sliding scale  tolerating dysphagia diet  no further hypoglycemia    - continue off sliding scale  can restart low dose sliding scale if glucose poc >180mg/dl  - reassess based on requirements  - goal inpatient glucose range: 140-180mg/dl    tentative discharge regimen:  low dose sliding scale only  has not required significant insulin during current inpatient stay  endocrinology will sign off   please consult again as needed  [phone]- Endocrine Dr Mónica Cm    sepsis  bacteremia- staph epidermidis  on vancomycin  s/p AV repair/ligation  for possible permacath placement  ID on board  on zosyn  required pressor    ESRD on HD  cautious insulin dosing  complicated by hypoglycemia on low dose sliding scale  off insulin    Discussed with primary team  Please call - Endocrine- Dr Mónica Cm as needed

## 2020-08-07 NOTE — PROGRESS NOTE ADULT - SUBJECTIVE AND OBJECTIVE BOX
Patient is a 69y old  Female who presents with a chief complaint of Painful swelling at AV fistula. (06 Aug 2020 18:51)    pt seen in tele [ x ], reg med floor [   ], bed [ x ], chair at bedside [   ], a+o x3 [ x ], lethargic [  ],    nad [ x ], right chest permacath [x]      Allergies    sulfADIAZINE (Angioedema)        Vitals    T(F): 98.3 (08-07-20 @ 05:13), Max: 98.3 (08-07-20 @ 05:13)  HR: 79 (08-07-20 @ 05:13) (74 - 80)  BP: 138/91 (08-07-20 @ 05:13) (131/57 - 155/87)  RR: 18 (08-07-20 @ 05:13) (16 - 18)  SpO2: 95% (08-07-20 @ 05:13) (95% - 100%)  Wt(kg): --  CAPILLARY BLOOD GLUCOSE      POCT Blood Glucose.: 145 mg/dL (06 Aug 2020 21:58)      Labs                          9.4    8.99  )-----------( 344      ( 06 Aug 2020 07:10 )             29.3       08-06    142  |  103  |  7   ----------------------------<  81  3.9   |  29  |  3.03<H>    Ca    8.3<L>      06 Aug 2020 07:10  Phos  3.9     08-05  Mg     2.0     08-05    TPro  6.7  /  Alb  1.8<L>  /  TBili  0.5  /  DBili  x   /  AST  23  /  ALT  17  /  AlkPhos  241<H>  08-05            .Blood Blood-Peripheral  08-02 @ 11:14   No growth to date.  --  --      .Blood Blood-Peripheral  07-31 @ 08:06   Growth in aerobic and anaerobic bottles: Staphylococcus epidermidis  "Susceptibilities not performed"  --    Growth in aerobic bottle: Gram Positive Cocci in Clusters  Growth in anaerobic bottle: Gram Positive Cocci in Clusters      .Surgical Swab left arm av graft C&S  07-31 @ 06:41   Normal skin albin isolated  --  --      .Urine Clean Catch (Midstream)  07-30 @ 01:34   >=3 organisms. Probable collection contamination.  --  --      .Blood Blood-Peripheral  07-29 @ 23:02   Growth in aerobic and anaerobic bottles: Staphylococcus epidermidis  "Due to technical problems, Proteus sp. will Not be reported as part of  the BCID panel until further notice"  ***Blood Panel PCR results on this specimen are available  approximately 3 hours after the Gram stain result.***  Gram stain, PCR, and/or culture results may not always  correspond due to difference in methodologies.  ************************************************************  This PCR assay was performed using [employer].  The following targets are tested for: Enterococcus,  vancomycin resistant enterococci, Listeria monocytogenes,  coagulase negative staphylococci, S. aureus,  methicillin resistant S. aureus, Streptococcus agalactiae  (Group B), S. pneumoniae, S. pyogenes (Group A),  Acinetobacter baumannii, Enterobacter cloacae, E. coli,  Klebsiella oxytoca, K. pneumoniae, Proteus sp.,  Serratia marcescens, Haemophilus influenzae,  Neisseria meningitidis, Pseudomonas aeruginosa, Candida  albicans, C. glabrata, C krusei, C parapsilosis,  C. tropicalis and the KPC resistance gene.  --  Blood Culture PCR          Radiology Results      Meds    MEDICATIONS  (STANDING):  Neuro :  no focal deficits  Respiratory: CTA B/L  CV: RRR, S1S2, no murmurs,   Abdominal: Soft, NT, ND +BS,  Extremities: No edema, + peripheral pulses, left ue dressing cdi, distal forearm and hand edema,      ASSESSMENT      Infected prosthetic vascular graft LUE  Pseudoaneurysm of LUE arteriovenous graft  Cellulitis of left upper extremity    uti   a/p acute blood loss anemia  h/o ESRD on hd,   DM (diabetes mellitus)  Vitamin D deficiency  Hyperphosphatemia  Hypertension  S/P bunionectomy  hemorrhoidectomy  oral cancer  left AVF (arteriovenous fistula)      PLAN      s/p Excision, infected graft, extremity and Ligation of arteriovenous fistula or access graft of upper extremity 7/30/20  vasc surg f/u    cont pain control  cont wound care  gi/dvt prophylaxis  zosyn d/c'd,   id f/u   Dose Vancomycin based on trough daily level   cont IV Vancomycin 4 to 6 weeks , during dialysis   Wound care as per Vascular team  blood cx with  Staphylococcus epidermidis noted above  rept blood cx  8/2 neg noted above  Surgical Swab left arm av graft C&S with Normal skin albin isolated noted above   echo with Normal mitral valve. Normal trileaflet aortic valve. Mild aortic regurgitation.  Hyperdynamic left ventricular systolic function (EF >70%). Grade I diastolic dysfunction noted above  cardio f/u   D/W Pt risk and benefit of ELIZABETH, she declines and will treat 4-6 of ABX  cta neck with No cervical fluid collection. If malignancy is a strong clinical concern, PET CT exam recommended for further evaluation. Multiple pulmonary nodules as described above. CT chest recommended noted above  f/u ct chest  pulm cons  ct abd pelv with No retroperitoneal hematoma. Large amount of stool within the rectum.   cont senna 2 tabs qhs  cont miralax daily   malfunctioning shiley s/p shiley exchange at bedside 8/3/20  s/p d/c right groin shiley  s/p right permacath placement by ir  renal f/u   pt s/p hd yesterday  Epogen 4000 TIW  pt off pressors  hgba1c 5.4   endo f/u   fasting , prandial controlled  serum glucose tightly controlled  low dose sliding scale stopped  reassess based on requirements  goal inpatient glucose range: 140-180mg/d   swallow eval noted and rec Puree solids with Nectar Thick liquids.   phys tx eval noted and rec kathie   palliative eval  cont current meds   d/c plan for kathie pend ct chestchlorhexidine 2% Cloths 1 Application(s) Topical <User Schedule>  chlorhexidine 2% Cloths 1 Application(s) Topical daily  dextrose 5% + sodium chloride 0.9%. 1000 milliLiter(s) (50 mL/Hr) IV Continuous <Continuous>  epoetin joey-epbx (RETACRIT) Injectable 4000 Unit(s) IV Push <User Schedule>  metoprolol tartrate 12.5 milliGRAM(s) Oral two times a day  mupirocin 2% Nasal 1 Application(s) Nasal every 12 hours      MEDICATIONS  (PRN):  acetaminophen   Tablet .. 650 milliGRAM(s) Oral every 6 hours PRN Mild Pain (1 - 3)  HYDROmorphone  Injectable 0.5 milliGRAM(s) IV Push every 6 hours PRN Moderate Pain (4 - 6)      Physical Exam Patient is a 69y old  Female who presents with a chief complaint of Painful swelling at AV fistula. (06 Aug 2020 18:51)    pt seen in tele [ x ], reg med floor [   ], bed [ x ], chair at bedside [   ], a+o x3 [ x ], lethargic [  ],    nad [ x ], right chest permacath [x]      Allergies    sulfADIAZINE (Angioedema)        Vitals    T(F): 98.3 (08-07-20 @ 05:13), Max: 98.3 (08-07-20 @ 05:13)  HR: 79 (08-07-20 @ 05:13) (74 - 80)  BP: 138/91 (08-07-20 @ 05:13) (131/57 - 155/87)  RR: 18 (08-07-20 @ 05:13) (16 - 18)  SpO2: 95% (08-07-20 @ 05:13) (95% - 100%)  Wt(kg): --  CAPILLARY BLOOD GLUCOSE      POCT Blood Glucose.: 145 mg/dL (06 Aug 2020 21:58)      Labs                          9.4    8.99  )-----------( 344      ( 06 Aug 2020 07:10 )             29.3       08-06    142  |  103  |  7   ----------------------------<  81  3.9   |  29  |  3.03<H>    Ca    8.3<L>      06 Aug 2020 07:10  Phos  3.9     08-05  Mg     2.0     08-05    TPro  6.7  /  Alb  1.8<L>  /  TBili  0.5  /  DBili  x   /  AST  23  /  ALT  17  /  AlkPhos  241<H>  08-05            .Blood Blood-Peripheral  08-02 @ 11:14   No growth to date.  --  --      .Blood Blood-Peripheral  07-31 @ 08:06   Growth in aerobic and anaerobic bottles: Staphylococcus epidermidis  "Susceptibilities not performed"  --    Growth in aerobic bottle: Gram Positive Cocci in Clusters  Growth in anaerobic bottle: Gram Positive Cocci in Clusters      .Surgical Swab left arm av graft C&S  07-31 @ 06:41   Normal skin albin isolated  --  --      .Urine Clean Catch (Midstream)  07-30 @ 01:34   >=3 organisms. Probable collection contamination.  --  --      .Blood Blood-Peripheral  07-29 @ 23:02   Growth in aerobic and anaerobic bottles: Staphylococcus epidermidis  "Due to technical problems, Proteus sp. will Not be reported as part of  the BCID panel until further notice"  ***Blood Panel PCR results on this specimen are available  approximately 3 hours after the Gram stain result.***  Gram stain, PCR, and/or culture results may not always  correspond due to difference in methodologies.  ************************************************************  This PCR assay was performed using [employer].  The following targets are tested for: Enterococcus,  vancomycin resistant enterococci, Listeria monocytogenes,  coagulase negative staphylococci, S. aureus,  methicillin resistant S. aureus, Streptococcus agalactiae  (Group B), S. pneumoniae, S. pyogenes (Group A),  Acinetobacter baumannii, Enterobacter cloacae, E. coli,  Klebsiella oxytoca, K. pneumoniae, Proteus sp.,  Serratia marcescens, Haemophilus influenzae,  Neisseria meningitidis, Pseudomonas aeruginosa, Candida  albicans, C. glabrata, C krusei, C parapsilosis,  C. tropicalis and the KPC resistance gene.  --  Blood Culture PCR          Radiology Results    < from: CT Chest No Cont (08.06.20 @ 16:53) >  LUNGS, AIRWAYS: The central airways are patent. The lungs are clear.    5 mm left upper lobe anterior nodule with central air lucency.  Left upper lobe apical nodule measures 7 mm with mixed attenuation    PLEURA: Small bilateral effusions.    VESSELS: Right IJ permacath tip in the cavoatrial junction. Normal caliber aorta.    HEART: Normal heart size. No pericardial effusion. Coronary artery calcifications are present.    MEDIASTINUM AND LITA: No adenopathy.    UPPER ABDOMEN: Chronic pancreatitis. Atrophic kidneys.    BONES AND SOFT TISSUES: No acute bony abnormality.    IMPRESSION:    Nonspecific 5 mm and 7 mm left upper lobe pulmonary nodules new since 2016. CT at 3-6 month to confirm persistence. If unchanged and solid component remains <6mm, annual CT should be performed for 5 years. Persistent part-solid nodules with solid components >= 6 mm should be considered highly suspicious.    < end of copied text >      Meds    MEDICATIONS  (STANDING):  chlorhexidine 2% Cloths 1 Application(s) Topical <User Schedule>  chlorhexidine 2% Cloths 1 Application(s) Topical daily  dextrose 5% + sodium chloride 0.9%. 1000 milliLiter(s) (50 mL/Hr) IV Continuous <Continuous>  epoetin joey-epbx (RETACRIT) Injectable 4000 Unit(s) IV Push <User Schedule>  metoprolol tartrate 12.5 milliGRAM(s) Oral two times a day  mupirocin 2% Nasal 1 Application(s) Nasal every 12 hours      MEDICATIONS  (PRN):  acetaminophen   Tablet .. 650 milliGRAM(s) Oral every 6 hours PRN Mild Pain (1 - 3)  HYDROmorphone  Injectable 0.5 milliGRAM(s) IV Push every 6 hours PRN Moderate Pain (4 - 6)      Physical Exam    Neuro :  no focal deficits  Respiratory: CTA B/L  CV: RRR, S1S2, no murmurs,   Abdominal: Soft, NT, ND +BS,  Extremities: No edema, + peripheral pulses, left ue dressing cdi, distal forearm and hand edema,      ASSESSMENT      Infected prosthetic vascular graft LUE  Pseudoaneurysm of LUE arteriovenous graft  Cellulitis of left upper extremity    uti   a/p acute blood loss anemia  h/o ESRD on hd,   DM (diabetes mellitus)  Vitamin D deficiency  Hyperphosphatemia  Hypertension  S/P bunionectomy  hemorrhoidectomy  oral cancer  left AVF (arteriovenous fistula)      PLAN      s/p Excision, infected graft, extremity and Ligation of arteriovenous fistula or access graft of upper extremity 7/30/20  vasc surg f/u    cont pain control  cont wound care  gi/dvt prophylaxis  zosyn d/c'd,   id f/u   Dose Vancomycin based on trough daily level   cont IV Vancomycin 4 to 6 weeks , during dialysis   Wound care as per Vascular team  blood cx with  Staphylococcus epidermidis noted above  rept blood cx  8/2 neg noted above  Surgical Swab left arm av graft C&S with Normal skin albin isolated noted above   echo with Normal mitral valve. Normal trileaflet aortic valve. Mild aortic regurgitation.  Hyperdynamic left ventricular systolic function (EF >70%). Grade I diastolic dysfunction noted above  cardio f/u   D/W Pt risk and benefit of ELIZABETH, she declines and will treat 4-6 of ABX  cta neck with No cervical fluid collection. If malignancy is a strong clinical concern, PET CT exam recommended for further evaluation. Multiple pulmonary nodules as described above. CT chest recommended noted above  ct chest with Nonspecific 5 mm and 7 mm left upper lobe pulmonary nodules new since 2016. CT at 3-6 month to confirm persistence. If unchanged and solid component remains <6mm, annual CT should be performed for 5 years.   Persistent part-solid nodules with solid components >= 6 mm should be considered highly suspicious noted above.  pulm f/u   f/u Quantiferon TB gold outpt  pt to repeat CT chest at 3-6 month to confirm persistence.  ct abd pelv with No retroperitoneal hematoma. Large amount of stool within the rectum.   cont senna 2 tabs qhs  cont miralax daily   malfunctioning shiley s/p shiley exchange at bedside 8/3/20  s/p d/c right groin shiley  s/p right permacath placement by ir  renal f/u   pt for hd today  Epogen 4000 TIW  pt off pressors  hgba1c 5.4   endo f/u   fasting , prandial controlled  serum glucose tightly controlled  low dose sliding scale stopped  reassess based on requirements  goal inpatient glucose range: 140-180mg/d   swallow eval noted and rec Puree solids with Nectar Thick liquids.   phys tx eval noted and rec kathie   palliative eval  cont current meds   d/c plan for kathie after hd if stable

## 2020-08-07 NOTE — PROGRESS NOTE ADULT - ASSESSMENT
69 yr old F from [facility], with PMH of HTN, ESRD, DM, Hyperphosphatemia,  Oral cancer(s/p excision) , comes to the ED  with left arm AV fistula pain and swelling x1 week,sepsis due to staph epi, s/p ligation of AVF, acute anemia.  1.S/P PC.  2.Sepsis-ABX as per ID.  3.Acute anemia-s/p  PRBC.  4.ESRD-HD as per renal.  5.DM-Insulin.  6.Plastics eval noted.  7.GI and DVT prophylaxis.

## 2020-08-07 NOTE — PROGRESS NOTE ADULT - SUBJECTIVE AND OBJECTIVE BOX
Problem List:  ESRD  Post IJ right side  PatienT c/o itching in dressing area and pain in exit site of IJ    PAST MEDICAL & SURGICAL HISTORY:  DM (diabetes mellitus)  Vitamin D deficiency  ESRD (end stage renal disease)  Hyperphosphatemia  Hypertension  S/P bunionectomy: bilateral great toes  H/O hemorrhoidectomy  H/O oral cancer  AVF (arteriovenous fistula): x3      sulfADIAZINE (Angioedema)      MEDICATIONS  (STANDING):  chlorhexidine 2% Cloths 1 Application(s) Topical <User Schedule>  chlorhexidine 2% Cloths 1 Application(s) Topical daily  dextrose 5% + sodium chloride 0.9%. 1000 milliLiter(s) (50 mL/Hr) IV Continuous <Continuous>  epoetin joey-epbx (RETACRIT) Injectable 4000 Unit(s) IV Push <User Schedule>  metoprolol tartrate 12.5 milliGRAM(s) Oral two times a day  mupirocin 2% Nasal 1 Application(s) Nasal every 12 hours    MEDICATIONS  (PRN):  acetaminophen   Tablet .. 650 milliGRAM(s) Oral every 6 hours PRN Mild Pain (1 - 3)  calamine/zinc oxide Lotion 1 Application(s) Topical daily PRN Itching  HYDROmorphone  Injectable 0.5 milliGRAM(s) IV Push every 6 hours PRN Moderate Pain (4 - 6)                            9.4    8.99  )-----------( 344      ( 06 Aug 2020 07:10 )             29.3     08-06    142  |  103  |  7   ----------------------------<  81  3.9   |  29  |  3.03<H>    Ca    8.3<L>      06 Aug 2020 07:10  Phos  3.9     08-05  Mg     2.0     08-05    TPro  6.7  /  Alb  1.8<L>  /  TBili  0.5  /  DBili  x   /  AST  23  /  ALT  17  /  AlkPhos  241<H>  08-05    PT/INR - ( 05 Aug 2020 10:35 )   PT: 16.0 sec;   INR: 1.39 ratio                 REVIEW OF SYSTEMS:  AS ABOVE  No SOB  Appetite improved.        VITALS:  T(F): 98.3 (08-07-20 @ 05:13), Max: 98.3 (08-07-20 @ 05:13)  HR: 79 (08-07-20 @ 05:13)  BP: 138/91 (08-07-20 @ 05:13)  RR: 18 (08-07-20 @ 05:13)  SpO2: 95% (08-07-20 @ 05:13)  Wt(kg): --      PHYSICAL EXAM:  Constitutional: well developed, no diaphoresis, no distress.  Neck: No JVD, no carotid bruit, supple, no adenopathy  Respiratory: Good air entrance B/L, no wheezes, rales or rhonchi  Cardiovascular: S1, S2, RRR, no pericardial rub, no murmur  Abdomen: BS+, soft, no tenderness, no bruit  Pelvis: bladder nondistended  Extremities: No cyanosis or clubbing. No peripheral edema.   Pulses: All present  Neurological: A/O x 3, no focal deficits  Psychiatric: Normal mood, normal affect  Right IJ exit site with no erythema or rash  No discharge in exit area.  No erythema in tunnel area  Dressing was removed

## 2021-08-27 NOTE — ED PROVIDER NOTE - CPE EDP NEURO NORM
[] : No [No] : In the past 12 months have you used drugs other than those required for medical reasons? No [No falls in past year] : Patient reported no falls in the past year [0] : 2) Feeling down, depressed, or hopeless: Not at all (0) [PHQ-2 Negative - No further assessment needed] : PHQ-2 Negative - No further assessment needed [de-identified] : regular [DNV4Nmgck] : 0 [Patient declined mammogram] : Patient declined mammogram [Patient declined PAP Smear] : Patient declined PAP Smear [Patient declined bone density test] : Patient declined bone density test [Patient declined colonoscopy] : Patient declined colonoscopy [HIV test declined] : HIV test declined [Hepatitis C test declined] : Hepatitis C test declined [Change in mental status noted] : No change in mental status noted normal... [Language] : denies difficulty with language [Behavior] : denies difficulty with behavior [Learning/Retaining New Information] : denies difficulty learning/retaining new information [Handling Complex Tasks] : denies difficulty handling complex tasks [Reasoning] : denies difficulty with reasoning [Spatial Ability and Orientation] : denies difficulty with spatial ability and orientation [None] : None [Unemployed] : unemployed [With Family] : lives with family [] :  [Sexually Active] : not sexually active [Feels Safe at Home] : Feels safe at home [Full assistance needed] : managing finances [Reports changes in hearing] : Reports no changes in hearing [Reports changes in vision] : Reports no changes in vision [Reports normal functional visual acuity (ie: able to read med bottle)] : Reports normal functional visual acuity [Reports changes in dental health] : Reports no changes in dental health [Smoke Detector] : smoke detector [Carbon Monoxide Detector] : carbon monoxide detector [Seat Belt] :  uses seat belt [Sunscreen] : uses sunscreen [FreeTextEntry1] : patient does not verbalize much [With Patient/Caregiver] : , with patient/caregiver [Designated Healthcare Proxy] : Designated healthcare proxy [AdvancecareDate] : 08/21

## 2021-12-13 NOTE — PHYSICAL THERAPY INITIAL EVALUATION ADULT - BALANCE DISTURBANCE, IDENTIFIED IMPAIRMENT CONTRIBUTE, REHAB EVAL
pain On Xarelto, instructed to hold per Cardiologist  Continue current regimen and medications.   cardiac clearance pending

## 2022-03-26 NOTE — H&P PST ADULT - NEGATIVE PSYCHIATRIC SYMPTOMS
4 = No assist / stand by assistance no insomnia/no suicidal ideation/no anxiety/no memory loss/no depression

## 2023-11-01 NOTE — DISCHARGE NOTE PROVIDER - NSDCQMAMI_CARD_ALL_CORE
[] [address]  [address] [facility].  [phone]  F: [phone] [] [address] [facility]  [address]   [address]  P: [phone]  F: [phone] [x] [address]  [address] [facility]  P: [phone]  F: [phone] [] [facility] [place]: [phone]  F: [phone] [] [address] [facility]  [address] [facility]   [address]   P: [phone]  F: [phone]  [] [address] [facility].   P: [phone]  F: [phone] [] [address] [facility]  [address] [facility] [address]   [address]  P: [phone]  F: [phone] [] [address] [facility]  [address] [facility]  [place]: [phone]  F: [phone] [] [address] [facility] [address]  [place]: [phone]  F: [phone]      Physical Therapy Daily Treatment Note    Date:  2023  Patient Name:  Jennifer Powell    :  1963  MRN: [id number]  Physician: Brendon Huerta MD                                       Insurance: [employer] [id number] 
No

## 2024-02-23 NOTE — ED ADULT NURSE NOTE - SKIN CAPILLARY REFILL
2017          Stas Norton  :  1971      To Whom It May Concern: This patient was seen in our office on 2017 . Work status: May return to work 4-5 hrs per day with minimal swatting and bending.  Pt may need to change positions freq
Statement Selected
2 seconds or less

## 2025-06-06 NOTE — ED ADULT NURSE NOTE - CHPI ED NUR DURATION
Chief Complaint(s):   No chief complaint on file.      Date of Injury: 24  Initial Treatment Date: 25  Date Last Seen: 6/3/25  Visit Number of Current Episode: 7  Flare Count: na  Mechanism of Onset: unknown  Initial pain ratin  Occupation: Retired  Referred by: Miles Mckeon DC  Primary Care Physician: Bernard Padron MD  I have reviewed the past medical history, family history, social history, medications and allergies listed in the medical record as obtained by my nursing staff and support staff and agree with their documentation.  Advance Directive Status: [facility]   Date informed consent expires on: 26  Medicare Waiver signed: 25  Discharged from care: No    SUBJECTIVE:        Marii Angle returns for continued treatment.     Symptom location: low back pain,  bilateral  Pain/Symptom since last visit: better  Function since last visit: was better, then went back to the same after doing some gardening  Soreness post treatment: Yes 1 day(s).   New symptoms (i.e. accident/injuries)?: No   Current Pain/Symptom Scale (0-10, 10 being the worst, 0 being no pain): 2  Pain/Symptom at its best since last visit 0-10: 2  Pain/Symptom at its worst since last visit 0-10: 2  Pain/Symptom Description: aching  Increases pain/symptom: yard work or gardening  Radiates: yes From: low back pain,  right to: calf, right  Numbness: no in upper and lower extremities bilaterally  Tingling: no in upper and lower extremities bilaterally  Bowel/Bladder Dysfunction: no  Sleep disturbance due to pain/symptom: No  Exercises/stretches: yes  Headache: no  Patient reports feeling better since initial visit by 40%     Additional information: none    Marii Angle returns for continued treatment.     Symptom location: {mm chiro pain location:534419}  Pain/Symptom since last visit: {mmbetter/worse:060107}  Function since last visit: {mmbetter/worse:158942}  Soreness post treatment: {mmyesno:603930} {mm1-10:122672}  {mmhoursdays:803784}   New symptoms (i.e. accident/injuries)?: {mmyesno:808618} ***  Current Pain/Symptom Scale (0-10, 10 being the worst, 0 being no pain): {Numbers 0-10:0656114}  Pain/Symptom at its best since last visit 0-10: {Numbers 0-10:5602320}  Pain/Symptom at its worst since last visit 0-10: {Numbers 0-10:9580047}  Pain/Symptom Description: {mmpaindescription:445063}  Increases pain/symptom: {mm pain worse:202619}  Radiates: {Yes No  N/A:232900} From: {mm chiro pain location:300282} to: {mmbodyparts:496927}  Numbness: {Yes No  N/A:885178} in {mm extremity list:202623}  Tingling: {Yes No  N/A:643119} in {mm extremity list:202623}  Bowel/Bladder Dysfunction: {Yes No  N/A:429816}  Sleep disturbance due to pain/symptom: {mmsleep:626716}  Exercises/stretches: {Yes No  N/A:055083}  Headache: {Yes No  N/A:314457} - Location: {mmheadache:212343}  Patient reports feeling {mmbetterworsepercentage:688220} since initial visit by {mmpercent:437596::\"100\"}%     Additional information: ***    VITALS    There were no vitals taken for this visit.        Patient Emotional screening was completed 4/28/25; [employer]/[place] Pain Supplemental Questionnaire score was 3 out of 40.    OBJECTIVE:      Motion units are composed of 2 bones and are identified as one joint/region except for C7-T1, T12-L1, or L5-S1 which are motion units that are composed of 2 regions.    Areas of deferred treatment NA deferred due to NA.    Thoracic Evaluation:    Thoracic spine facet joint function is within normal limits except for her T6/T7, Right, T6/T7, Left, T7/T8, Right, T7/T8, Left, T8/T9, Right, T8/T9, Left, T9/T10, Right, T9/T10, Left, T10/T11, Right, T10/T11, Left, T11/T12, Right, T11/T12, Left, T12/L1, Right, and T12/L1, Left facet joints that exhibited limited passive range of motion and segmental restriction and tenderness upon palpation. The muscles associated with this area were examined for normal flexibility and tone. All were within normal  limits except for her Thoracic Erector Spinae, Bilateral that exhibited limited flexibility and abnormal resting tone. Skin inspection in this area appeared to be free of lesions, rashes, or ulcers. Lymph nodes appeared to be not swollen and non tender in this area.    Lumbar, Sacral and Pelvic Evaluation:    Point tenderness noted in Gluteus gilberto, Right, Gluteus medius, Right, Obturator internus, Right, Gemellus Superior, Right, and Gemellus Inferior, Right muscles. Trigger points noted.     Lumbar spine facet joint function is within normal limits except for her L5/S1, Right facet joints that exhibited limited passive range of motion and segmental restriction and tenderness on palpation. The muscles associated with this area were examined for normal flexibility and tone. All were within normal limits except for her Gluteus Gilberto, Right that exhibited limited flexibility and were hypertonic at rest. Skin inspection in this area appeared to be free of lesions, rashes, or ulcers. Lymph nodes appeared to be not swollen and non tender in this area.    Pelvic and Sacral spine facet joint function is within normal limits except for her Sacroiliac, Right facet joints that exhibited limited passive range of motion and segmental restriction and tenderness on palpation. The muscles associated with this area were examined for normal flexibility and tone. All were within normal limits except for her Gluteus Gilberto, Right, Gluteus Medius, Right, Obturator Internus, Right, Gemellus Superior, Right, and Gemellus Inferior, Right  that exhibited limited flexibility and were hypertonic at rest. Skin inspection in this area appeared to be free of lesions, rashes, or ulcers. Lymph nodes appeared to be not swollen and non tender in this area.    ASSESSMENT:   No diagnosis found.    Complicating Factors/Co-morbidities:   See Hx    PLAN:    Imaging Reviewed During Visit: IFEANYI    I personally reviewed the following imaging prior to  treatment: Lumbar X-ray and Lumbar MRI.    Referral/Orders Placed this visit: Lumbar MRI .     Functional Goal: Reduce pain/symptoms with ADLs.   Additional Comments: NA     Manipulation/Mobilization: Patient was evaluated and treated the musculature noted as taut in objective findings of this progress report to improve flexibility and decrease strain to spinal structures. Treatment time was for 6 Minute .    Cervical: N/A NA  Thoracic: DIVERSIFIED TECHNIQUE NA  Lumbar: FLEXION DISTRACTION TECHNIQUE Side heavy and LOW VELOCITY LOW AMPLITUDE TECHNIQUE Side heavy   Sacral: FLEXION DISTRACTION TECHNIQUE Side heavy and LOW VELOCITY LOW AMPLITUDE TECHNIQUE Side heavy   Pelvic: FLEXION DISTRACTION TECHNIQUE Side heavy and LOW VELOCITY LOW AMPLITUDE TECHNIQUE Side heavy   Upper Extremity: N/A  Lower Extremity: N/A    Soft Tissue Treatment: Soft tissue treatment was performed over the previously documented musculature restricted and tender to palpation for 6 Minute for the purpose of improving joint mobility, alignment, tissue repair, lymphatic drainage, pain relief, and myofascial adhesions.    Myofascial Release  PROM performed today by Miles Mckeon, EVELINE Gluteus kelli, Right, Gluteus medius, Right, Obturator internus, Right, Gemellus Superior, Right, and Gemellus Inferior, Right    Dry Needling: Discussed as form of treatment in the future  Date dry needling consent expires on: NA    Follow up: next week    Post Treatment: Marii Mae reported feeling better    Exercise:  Active Exercises/Stretches: Figure 4 Stretch , Low Back Roll while sitting to improve lumbar support, Massage Exercise using Tennis Ball under tender muscle and gently rolling on ball, and Lumbar/Thoracic Extension with Towel Patient instructed to do exercise to tolerance and demonstrated exercises and stretches without fail.  Time of instruction is less than 8 minutes.   Inactive or Previous Exercises/Stretches Given: NA    Patient informed that  increased muscle soreness after chiropractic treatment is normal and usually lasts 24-48 hours. Patient instructed to call if any questions or concerns arise.     Other treatment options discussed with patient massage therapy, physical therapy, dry needling, physiatry, and trigger point injections.    Patient is to return in 4-7 days for continued care and treatment of her condition consistent with plan of care.    Total time required for this encounter: 20 minutes include evaluation of medical records, review of recent new imaging, face-to-face contact with the patient, medical decision making, and documentation.     Goals of Care: Goal of care is to improve muscular and skeletal function and provide symptom relief as well as increased function. Care is initially planned for 1-3 times/week for 2-3 weeks and the duration of this plan is contingent upon patient response.   5/day(s)